# Patient Record
Sex: FEMALE | Race: ASIAN | Employment: FULL TIME | URBAN - METROPOLITAN AREA
[De-identification: names, ages, dates, MRNs, and addresses within clinical notes are randomized per-mention and may not be internally consistent; named-entity substitution may affect disease eponyms.]

---

## 2017-05-23 ENCOUNTER — OFFICE VISIT (OUTPATIENT)
Dept: FAMILY MEDICINE | Facility: CLINIC | Age: 52
End: 2017-05-23
Payer: COMMERCIAL

## 2017-05-23 VITALS
BODY MASS INDEX: 27.15 KG/M2 | HEIGHT: 61 IN | SYSTOLIC BLOOD PRESSURE: 122 MMHG | WEIGHT: 143.8 LBS | HEART RATE: 74 BPM | DIASTOLIC BLOOD PRESSURE: 84 MMHG | TEMPERATURE: 98 F | OXYGEN SATURATION: 100 %

## 2017-05-23 DIAGNOSIS — I10 HYPERTENSION, GOAL BELOW 140/90: ICD-10-CM

## 2017-05-23 DIAGNOSIS — Z86.69 HISTORY OF MIGRAINE HEADACHES: ICD-10-CM

## 2017-05-23 DIAGNOSIS — Z12.31 ENCOUNTER FOR SCREENING MAMMOGRAM FOR BREAST CANCER: ICD-10-CM

## 2017-05-23 DIAGNOSIS — K21.9 GASTROESOPHAGEAL REFLUX DISEASE WITHOUT ESOPHAGITIS: ICD-10-CM

## 2017-05-23 DIAGNOSIS — R09.82 ALLERGIC RHINITIS WITH POSTNASAL DRIP: ICD-10-CM

## 2017-05-23 DIAGNOSIS — Z00.00 ROUTINE GENERAL MEDICAL EXAMINATION AT A HEALTH CARE FACILITY: Primary | ICD-10-CM

## 2017-05-23 DIAGNOSIS — J30.9 ALLERGIC RHINITIS WITH POSTNASAL DRIP: ICD-10-CM

## 2017-05-23 DIAGNOSIS — E78.5 HYPERLIPIDEMIA LDL GOAL <130: ICD-10-CM

## 2017-05-23 DIAGNOSIS — I83.93 VARICOSE VEINS OF LEGS: ICD-10-CM

## 2017-05-23 LAB
ALBUMIN SERPL-MCNC: 3.9 G/DL (ref 3.4–5)
ALP SERPL-CCNC: 150 U/L (ref 40–150)
ALT SERPL W P-5'-P-CCNC: 37 U/L (ref 0–50)
ANION GAP SERPL CALCULATED.3IONS-SCNC: 5 MMOL/L (ref 3–14)
AST SERPL W P-5'-P-CCNC: 22 U/L (ref 0–45)
BILIRUB SERPL-MCNC: 0.6 MG/DL (ref 0.2–1.3)
BUN SERPL-MCNC: 12 MG/DL (ref 7–30)
CALCIUM SERPL-MCNC: 9.1 MG/DL (ref 8.5–10.1)
CHLORIDE SERPL-SCNC: 102 MMOL/L (ref 94–109)
CHOLEST SERPL-MCNC: 253 MG/DL
CO2 SERPL-SCNC: 32 MMOL/L (ref 20–32)
CREAT SERPL-MCNC: 0.62 MG/DL (ref 0.52–1.04)
GFR SERPL CREATININE-BSD FRML MDRD: ABNORMAL ML/MIN/1.7M2
GLUCOSE SERPL-MCNC: 113 MG/DL (ref 70–99)
HDLC SERPL-MCNC: 97 MG/DL
LDLC SERPL CALC-MCNC: 129 MG/DL
NONHDLC SERPL-MCNC: 156 MG/DL
POTASSIUM SERPL-SCNC: 4 MMOL/L (ref 3.4–5.3)
PROT SERPL-MCNC: 8 G/DL (ref 6.8–8.8)
SODIUM SERPL-SCNC: 139 MMOL/L (ref 133–144)
TRIGL SERPL-MCNC: 133 MG/DL

## 2017-05-23 PROCEDURE — 90715 TDAP VACCINE 7 YRS/> IM: CPT | Performed by: FAMILY MEDICINE

## 2017-05-23 PROCEDURE — 36415 COLL VENOUS BLD VENIPUNCTURE: CPT | Performed by: FAMILY MEDICINE

## 2017-05-23 PROCEDURE — 99396 PREV VISIT EST AGE 40-64: CPT | Mod: 25 | Performed by: FAMILY MEDICINE

## 2017-05-23 PROCEDURE — 90471 IMMUNIZATION ADMIN: CPT | Performed by: FAMILY MEDICINE

## 2017-05-23 PROCEDURE — 99213 OFFICE O/P EST LOW 20 MIN: CPT | Mod: 25 | Performed by: FAMILY MEDICINE

## 2017-05-23 PROCEDURE — 80053 COMPREHEN METABOLIC PANEL: CPT | Performed by: FAMILY MEDICINE

## 2017-05-23 PROCEDURE — 80061 LIPID PANEL: CPT | Performed by: FAMILY MEDICINE

## 2017-05-23 RX ORDER — FLUTICASONE PROPIONATE 50 MCG
1-2 SPRAY, SUSPENSION (ML) NASAL DAILY
Qty: 1 BOTTLE | Refills: 0 | Status: SHIPPED | OUTPATIENT
Start: 2017-05-23 | End: 2018-03-14

## 2017-05-23 RX ORDER — OMEPRAZOLE 40 MG/1
40 CAPSULE, DELAYED RELEASE ORAL DAILY PRN
Qty: 90 CAPSULE | Refills: 3 | Status: SHIPPED | OUTPATIENT
Start: 2017-05-23 | End: 2018-05-14

## 2017-05-23 RX ORDER — AMLODIPINE BESYLATE 10 MG/1
10 TABLET ORAL DAILY
Qty: 90 TABLET | Refills: 3 | Status: SHIPPED | OUTPATIENT
Start: 2017-05-23 | End: 2017-05-24

## 2017-05-23 RX ORDER — SIMVASTATIN 20 MG
20 TABLET ORAL AT BEDTIME
Qty: 90 TABLET | Refills: 3 | Status: SHIPPED | OUTPATIENT
Start: 2017-05-23 | End: 2017-09-21

## 2017-05-23 NOTE — NURSING NOTE
"Chief Complaint   Patient presents with     Physical       Initial /84  Pulse 74  Temp 98  F (36.7  C) (Tympanic)  Ht 5' 1\" (1.549 m)  Wt 143 lb 12.8 oz (65.2 kg)  SpO2 100%  BMI 27.17 kg/m2 Estimated body mass index is 27.17 kg/(m^2) as calculated from the following:    Height as of this encounter: 5' 1\" (1.549 m).    Weight as of this encounter: 143 lb 12.8 oz (65.2 kg).  Medication Reconciliation: complete     Due for mammogram, discussed with pt.  Referral was EXP, extended till Spetember 2017- pt was advised to have done before that time.     Naida Rose MA      "

## 2017-05-23 NOTE — LETTER
[facility] [place]  [address]  [place] [address]  [phone]        May 30, 2017      Mercedes Long  [address]   [place] [address]        Dear Mercedes,      Your recent labs showed;     Complete Metabolic Panel (panel that checks liver function, kidney function and electrolytes) was normal except blood glucose was slightly elevated.   This means that you have no evidence of diabetes at this time but you could be at risk ( prediabetes)   Will repeat your glucose test in 6 months     Cholesterol panel showed that your cholesterol is slightly elevated.   Total cholesterol was slightly high. Your LDL (bad cholesterol) goal is < 130. Yours was 129. HDL (good cholesterol) was within normal range.     ADVICE: Continue taking your Simvastatin as prescribed. Eat a well balanced heart healthy diet and exercise regularly (at least 5 times/ week for 45 minutes each session). Losing some weight will also help.     Results for orders placed or performed in visit on 05/23/17   Lipid Profile (Chol, Trig, HDL, LDL calc)   Result Value Ref Range    Cholesterol 253 (H) <200 mg/dL    Triglycerides 133 <150 mg/dL    HDL Cholesterol 97 >49 mg/dL    LDL Cholesterol Calculated 129 (H) <100 mg/dL    Non HDL Cholesterol 156 (H) <130 mg/dL   Comprehensive metabolic panel (BMP + Alb, Alk Phos, ALT, AST, Total. Bili, TP)   Result Value Ref Range    Sodium 139 133 - 144 mmol/L    Potassium 4.0 3.4 - 5.3 mmol/L    Chloride 102 94 - 109 mmol/L    Carbon Dioxide 32 20 - 32 mmol/L    Anion Gap 5 3 - 14 mmol/L    Glucose 113 (H) 70 - 99 mg/dL    Urea Nitrogen 12 7 - 30 mg/dL    Creatinine 0.62 0.52 - 1.04 mg/dL    GFR Estimate >90  Non  GFR Calc   >60 mL/min/1.7m2    GFR Estimate If Black >90   GFR Calc   >60 mL/min/1.7m2    Calcium 9.1 8.5 - 10.1 mg/dL    Bilirubin Total 0.6 0.2 - 1.3 mg/dL    Albumin 3.9 3.4 - 5.0 g/dL    Protein Total 8.0 6.8 - 8.8 g/dL    Alkaline Phosphatase 150  40 - 150 U/L    ALT 37 0 - 50 U/L    AST 22 0 - 45 U/L     If you have any questions or concerns, please call myself or my nurse at [phone].    Sincerely,    Gilma Fabian M.D/chad

## 2017-05-23 NOTE — PROGRESS NOTES
SUBJECTIVE:     CC: Mercedes Long is an 51 year old woman who presents for preventive health visit.     Healthy Habits:    Do you get at least three servings of calcium containing foods daily (dairy, green leafy vegetables, etc.)? No    Amount of exercise or daily activities, outside of work: None    Problems taking medications regularly No    Medication side effects: No    Have you had an eye exam in the past two years? No    Do you see a dentist twice per year? No, 1x/ year  Do you have sleep apnea, excessive snoring or daytime drowsiness? Yes, snoring with sleeping     Medication Question  Has not been taking amitriptyline, feels she is still getting headaches, feels they are sinus headaches due to allergies.  States that she drainage at the back of her throat that is causing sore throat. No OTC meds tried.  Is wondering if she should start taking that medication again.   Has been off the Amitriptyline for over 6 months. States that the migraine headaches have been stable off the medications.     Also reports aches legs with varicose veins. No calf pain.     Patient informed that anything we discuss that is not related to preventative medicine, may be billed for; patient verbalizes understanding.    Today's PHQ-2 Score:   PHQ-2 ( 1999 [employer]) 5/23/2017 5/10/2016   Q1: Little interest or pleasure in doing things 0 0   Q2: Feeling down, depressed or hopeless 0 0   PHQ-2 Score 0 0       Abuse: Current or Past(Physical, Sexual or Emotional)- No  Do you feel safe in your environment - Yes    Social History   Substance Use Topics     Smoking status: Never Smoker     Smokeless tobacco: Not on file     Alcohol use No     The patient does not drink >3 drinks per day nor >7 drinks per week.    Recent Labs   Lab Test  05/10/16   0859 10/13/15 01/19/15   CHOL  167  221*  260*   HDL  87  61  74   LDL  62  129  159   TRIG  89  157  137   CHOLHDLRATIO   --   3.6  3.5   NHDL  80   --    --        Reviewed orders with patient.   Reviewed health maintenance and updated orders accordingly - Yes    Mammo Decision Support:  Patient over age 50, mutual decision to screen reflected in health maintenance.    Pertinent mammograms are reviewed under the imaging tab.  History of abnormal Pap smear:   NO - age 30- 65 PAP every 3 years recommended  Last 3 Pap Results:   PAP (no units)   Date Value   05/10/2016 NIL       Reviewed and updated as needed this visit by clinical staff  Tobacco  Allergies  Meds  Soc Hx        Reviewed and updated as needed this visit by Provider        Past Medical History:   Diagnosis Date     GERD (gastroesophageal reflux disease)      Hypercholesteremia      Hypertension       Past Surgical History:   Procedure Laterality Date     APPENDECTOMY       CARPAL TUNNEL RELEASE RT/LT Right      COLONOSCOPY WITH CO2 INSUFFLATION N/A 2016    Procedure: COLONOSCOPY WITH CO2 INSUFFLATION;  Surgeon: Duane, William Charles, MD;  Location:  OR     Obstetric History       T3      TAB0   SAB0   E0   M0   L3       # Outcome Date GA Lbr Rigo/2nd Weight Sex Delivery Anes PTL Lv   4             3 Term            2 Term            1 Term                   ROS:  C: NEGATIVE for fever, chills, change in weight  I: NEGATIVE for worrisome rashes, moles or lesions  E: NEGATIVE for vision changes or irritation  ENT: NEGATIVE for ear, mouth and throat problems  R: NEGATIVE for significant cough or SOB  B: NEGATIVE for masses, tenderness or discharge  CV: NEGATIVE for chest pain, palpitations or peripheral edema  GI: NEGATIVE for nausea, abdominal pain, heartburn, or change in bowel habits  : NEGATIVE for unusual urinary or vaginal symptoms. No vaginal bleeding.  M: NEGATIVE for significant arthralgias or myalgia  N: NEGATIVE for weakness, dizziness or paresthesias  P: NEGATIVE for changes in mood or affect     Current Outpatient Prescriptions   Medication Sig Dispense Refill     amLODIPine (NORVASC) 10 MG tablet Take  "1 tablet (10 mg) by mouth daily 90 tablet 3     omeprazole (PRILOSEC) 40 MG capsule Take 1 capsule (40 mg) by mouth daily as needed Take 30-60 minutes before a meal. 90 capsule 3     simvastatin (ZOCOR) 20 MG tablet Take 1 tablet (20 mg) by mouth At Bedtime 90 tablet 3     fluticasone (FLONASE) 50 MCG/ACT spray Spray 1-2 sprays into both nostrils daily 1 Bottle 0     aspirin 81 MG tablet Take by mouth daily       [DISCONTINUED] amLODIPine (NORVASC) 10 MG tablet Take 1 tablet (10 mg) by mouth daily 90 tablet 3     [DISCONTINUED] simvastatin (ZOCOR) 20 MG tablet   5     No Known Allergies  OBJECTIVE:     /84  Pulse 74  Temp 98  F (36.7  C) (Tympanic)  Ht 5' 1\" (1.549 m)  Wt 143 lb 12.8 oz (65.2 kg)  SpO2 100%  BMI 27.17 kg/m2  EXAM:  GENERAL: healthy, alert and no distress  EYES: Eyes grossly normal to inspection, PERRL and conjunctivae and sclerae normal  HENT: ear canals and TM's normal, nose and mouth without ulcers or lesions  NECK: no adenopathy, no asymmetry, masses, or scars and thyroid normal to palpation  RESP: lungs clear to auscultation - no rales, rhonchi or wheezes  BREAST: normal without masses, tenderness or nipple discharge and no palpable axillary masses or adenopathy  CV: regular rate and rhythm, normal S1 S2, no S3 or S4, no murmur, click or rub, no peripheral edema and peripheral pulses strong  ABDOMEN: soft, nontender, no hepatosplenomegaly, no masses and bowel sounds normal  MS: no gross musculoskeletal defects noted, no edema  SKIN: no suspicious lesions or rashes. Varicose veins on bilateral lower extremities.   NEURO: Normal strength and tone, mentation intact and speech normal  PSYCH: mentation appears normal, affect normal/bright    DATA  Labs in process.     ASSESSMENT/PLAN:     Mercedes was seen today for physical.    Diagnoses and all orders for this visit:    Routine general medical examination at a health care facility  -     TDAP VACCINE (ADACEL)  -     VACCINE ADMINISTRATION, " "INITIAL    Hyperlipidemia LDL goal <130  -     Refill: simvastatin (ZOCOR) 20 MG tablet; Take 1 tablet (20 mg) by mouth At Bedtime  -     Lipid Profile (Chol, Trig, HDL, LDL calc)  -     Comprehensive metabolic panel (BMP + Alb, Alk Phos, ALT, AST, Total. Bili, TP)    Hypertension, goal below 140/90, controlled on medications  -     Refill: amLODIPine (NORVASC) 10 MG tablet; Take 1 tablet (10 mg) by mouth daily  -     Comprehensive metabolic panel (BMP + Alb, Alk Phos, ALT, AST, Total. Bili, TP)    Gastroesophageal reflux disease without esophagitis  -     Refill: omeprazole (PRILOSEC) 40 MG capsule; Take 1 capsule (40 mg) by mouth daily as needed Take 30-60 minutes before a meal.    Encounter for screening mammogram for breast cancer  Screening mammogram previously ordered.   Patient to call and schedule    Varicose veins of legs  -  Recommended wearing compression stockings.   - VASCULAR SURGERY REFERRAL    Allergic rhinitis with postnasal drip  -    Start:  fluticasone (FLONASE) 50 MCG/ACT spray; Spray 1-2 sprays into both nostrils daily  May also take OTC Zyrtec as needed    History of migraine headaches, stable off medications  -     MIGRAINE ACTION PLAN  -     Cancel: amitriptyline (ELAVIL) 25 MG tablet; Take 1 tablet (25 mg) by mouth At Bedtime          COUNSELING:   Reviewed preventive health counseling, as reflected in patient instructions       Regular exercise       Healthy diet/nutrition    BP Screening:   Last 3 BP Readings:    BP Readings from Last 3 Encounters:   05/23/17 122/84   06/21/16 134/89   05/18/16 112/80       The following was recommended to the patient:  Re-screen BP within a year and recommended lifestyle modifications     reports that she has never smoked. She does not have any smokeless tobacco history on file.    Estimated body mass index is 27.17 kg/(m^2) as calculated from the following:    Height as of this encounter: 5' 1\" (1.549 m).    Weight as of this encounter: 143 lb 12.8 oz " (65.2 kg).   Weight management plan: Discussed healthy diet and exercise guidelines and patient will follow up in 12 months in clinic to re-evaluate.    Counseling Resources:  ATP IV Guidelines  Pooled Cohorts Equation Calculator  Breast Cancer Risk Calculator  FRAX Risk Assessment  ICSI Preventive Guidelines  Dietary Guidelines for Americans, 2010  USDA's MyPlate  ASA Prophylaxis  Lung CA Screening    Follow up annually and as needed thoughout the year.    Gilma Fabian MD  [facility]

## 2017-05-23 NOTE — MR AVS SNAPSHOT
After Visit Summary   5/23/2017    Mercedes Long    MRN: [id number]           Patient Information     Date Of Birth          1965        Visit Information        Provider Department      5/23/2017 9:15 AM Gilma Fabian MD; [employer] [facility] [place]        Today's Diagnoses     Routine general medical examination at a health care facility    -  1    Hyperlipidemia LDL goal <130        Hypertension, goal below 140/90        Gastroesophageal reflux disease without esophagitis        Encounter for screening mammogram for breast cancer        Varicose veins of legs        Allergic rhinitis with postnasal drip          Care Instructions      Preventive Health Recommendations  Female Ages 50 - 64    Yearly exam: See your health care provider every year in order to  o Review health changes.   o Discuss preventive care.    o Review your medicines if your doctor has prescribed any.      Get a Pap test every three years (unless you have an abnormal result and your provider advises testing more often).    If you get Pap tests with HPV test, you only need to test every 5 years, unless you have an abnormal result.     You do not need a Pap test if your uterus was removed (hysterectomy) and you have not had cancer.    You should be tested each year for STDs (sexually transmitted diseases) if you're at risk.     Have a mammogram every 1 to 2 years.    Have a colonoscopy at age 50, or have a yearly FIT test (stool test). These exams screen for colon cancer.      Have a cholesterol test every 5 years, or more often if advised.    Have a diabetes test (fasting glucose) every three years. If you are at risk for diabetes, you should have this test more often.     If you are at risk for osteoporosis (brittle bone disease), think about having a bone density scan (DEXA).    Shots: Get a flu shot each year. Get a tetanus shot every 10 years.    Nutrition:     Eat at least 5 servings of  fruits and vegetables each day.    Eat whole-grain bread, whole-wheat pasta and brown rice instead of white grains and rice.    Talk to your provider about Calcium and Vitamin D.     Lifestyle    Exercise at least 150 minutes a week (30 minutes a day, 5 days a week). This will help you control your weight and prevent disease.    Limit alcohol to one drink per day.    No smoking.     Wear sunscreen to prevent skin cancer.     See your dentist every six months for an exam and cleaning.    See your eye doctor every 1 to 2 years.          Follow-ups after your visit        Additional Services     VASCULAR SURGERY REFERRAL       Your provider has referred you to: **Vascular  Services [phone] - Varicose Veins & None - Please Order Appropriate Testing   [email or web address]    Please be aware that coverage of these services is subject to the terms and limitations of your health insurance plan.  Call member services at your health plan with any benefit or coverage questions.      Please bring the following with you to your appointment:    (1) Any X-Rays, CTs or MRIs which have been performed.  Contact the facility where they were done to arrange for  prior to your scheduled appointment.    (2) List of current medications   (3) This referral request   (4) Any documents/labs given to you for this referral                  Follow-up notes from your care team     Return in about 1 year (around 5/23/2018) for Physical Exam and as needed throughout the year.      Who to contact     Normal or non-critical lab and imaging results will be communicated to you by MyChart, letter or phone within 4 business days after the clinic has received the results. If you do not hear from us within 7 days, please contact the clinic through MyChart or phone. If you have a critical or abnormal lab result, we will notify you by phone as soon as possible.  Submit refill requests through [employer] or  "call your pharmacy and they will forward the refill request to us. Please allow 3 business days for your refill to be completed.          If you need to speak with a  for additional information , please call: [phone]             Additional Information About Your Visit        INNOBIharTweepsMap Information     [employer] lets you send messages to your doctor, view your test results, renew your prescriptions, schedule appointments and more. To sign up, go to [email or web address] . Click on \"Log in\" on the left side of the screen, which will take you to the Welcome page. Then click on \"Sign up Now\" on the right side of the page.     You will be asked to enter the access code listed below, as well as some personal information. Please follow the directions to create your username and password.     Your access code is: [email or web address]  Expires: 2017 10:09 AM     Your access code will  in 90 days. If you need help or a new code, please call your [place] clinic or [phone].        Care EveryWhere ID     This is your Care EveryWhere ID. This could be used by other organizations to access your [place] medical records  [id number]        Your Vitals Were     Pulse Temperature Height Pulse Oximetry BMI (Body Mass Index)       74 98  F (36.7  C) (Tympanic) 5' 1\" (1.549 m) 100% 27.17 kg/m2        Blood Pressure from Last 3 Encounters:   17 122/84   16 134/89   16 112/80    Weight from Last 3 Encounters:   17 143 lb 12.8 oz (65.2 kg)   16 144 lb 3.2 oz (65.4 kg)   16 142 lb 12.8 oz (64.8 kg)              We Performed the Following     Comprehensive metabolic panel (BMP + Alb, Alk Phos, ALT, AST, Total. Bili, TP)     Lipid Profile (Chol, Trig, HDL, LDL calc)     MIGRAINE ACTION PLAN     TDAP VACCINE (ADACEL)     VACCINE ADMINISTRATION, INITIAL     VASCULAR SURGERY REFERRAL          Today's Medication Changes          These changes are accurate as of: 17 10:09 AM.  " If you have any questions, ask your nurse or doctor.               Start taking these medicines.        Dose/Directions    fluticasone 50 MCG/ACT spray   Commonly known as:  FLONASE   Used for:  Allergic rhinitis with postnasal drip   Started by:  Gilma Fabian MD        Dose:  1-2 spray   Spray 1-2 sprays into both nostrils daily   Quantity:  1 Bottle   Refills:  0         These medicines have changed or have updated prescriptions.        Dose/Directions    omeprazole 40 MG capsule   Commonly known as:  priLOSEC   This may have changed:    - when to take this  - reasons to take this   Used for:  Gastroesophageal reflux disease without esophagitis   Changed by:  Gilma Fabian MD        Dose:  40 mg   Take 1 capsule (40 mg) by mouth daily as needed Take 30-60 minutes before a meal.   Quantity:  90 capsule   Refills:  3       simvastatin 20 MG tablet   Commonly known as:  ZOCOR   This may have changed:    - how much to take  - how to take this  - when to take this   Used for:  Hyperlipidemia LDL goal <130   Changed by:  Gilma Fabian MD        Dose:  20 mg   Take 1 tablet (20 mg) by mouth At Bedtime   Quantity:  90 tablet   Refills:  3            Where to get your medicines      [facility] [address] [place] [address], [place] - [address] AT [facility] [address], [place] [address]     Phone:  [phone]     amLODIPine 10 MG tablet    fluticasone 50 MCG/ACT spray    omeprazole 40 MG capsule    simvastatin 20 MG tablet                Primary Care Provider Office Phone #    Tushar Rojo [facility] [phone]       No address on file        Thank you!     Thank you for choosing [facility]  for your care. Our goal is always to provide you with excellent care. Hearing back from our patients is one way we can continue to improve our services. Please take a few minutes to complete the written survey that you may receive in the  mail after your visit with us. Thank you!             Your Updated Medication List - Protect others around you: Learn how to safely use, store and throw away your medicines at www.disposemymeds.org.          This list is accurate as of: 5/23/17 10:09 AM.  Always use your most recent med list.                   Brand Name Dispense Instructions for use    amLODIPine 10 MG tablet    NORVASC    90 tablet    Take 1 tablet (10 mg) by mouth daily       aspirin 81 MG tablet      Take by mouth daily       fluticasone 50 MCG/ACT spray    FLONASE    1 Bottle    Spray 1-2 sprays into both nostrils daily       omeprazole 40 MG capsule    priLOSEC    90 capsule    Take 1 capsule (40 mg) by mouth daily as needed Take 30-60 minutes before a meal.       simvastatin 20 MG tablet    ZOCOR    90 tablet    Take 1 tablet (20 mg) by mouth At Bedtime

## 2017-05-23 NOTE — LETTER
My Migraine Action Plan      Date: 5/23/2017     My Name: Mercedes Long   YOB: 1965  My Pharmacy: [id number]  [place], [place] [facility] [address] [facility] [address]        My Doctor: Tushar Mccartney     My Clinic: [facility]  [address]  [place] [address] [phone]        GREEN ZONE = Good Control    My headache plan is working.   I can do what I need to do.           I WILL:     ? Keep managing my triggers.  ? Write in my migraine diary each time I have a headache.  ? Keep taking my preventive (controller) medicine daily.  ? Take my relief and rescue medicine as needed.             YELLOW ZONE = Not Enough Control    My headache plan isn t always working.   My headaches keep me from doing   some of the things I need to do.       I WILL:     ? Set goals to control my triggers and act on them.  ? Write in my migraine diary each time I have a headache and review it for                      patterns or new triggers.  ? Keep taking my preventive (controller) medicine daily.  ? Take my relief and rescue medicine as needed.  ? Call my doctor or clinic at if I stay in the Yellow Zone.             RED ZONE = Poor or No Control    My headache plan has  failed. I can t do anything  when I have one. My  medicines aren t working.           I WILL:   ? Set goals to control my triggers and act on them.  ? Write in my migraine diary each time I have a headache and review it for                      patterns or new triggers.  ? Keep taking my preventive (controller) medicine daily.  ? Take my relief and rescue medicine as needed.  ? Call my doctor or clinic or go to urgent care or an ER if I m having the worst                  headache of my life.  ? Call my doctor or clinic or go to urgent care or an ER if my medicine doesn t work.  ? Let my doctor or clinic know within 2 weeks if I have gone to an urgent care or             emergency  department.

## 2017-05-24 DIAGNOSIS — I10 HYPERTENSION, GOAL BELOW 140/90: ICD-10-CM

## 2017-05-24 NOTE — TELEPHONE ENCOUNTER
Amlodipine      Last Written Prescription Date: 4/24/17  Last Fill Quantity: 90, # refills: 0    Last Office Visit with G, P or [facility] prescribing provider:  5/23/17   Future Office Visit:        BP Readings from Last 3 Encounters:   05/23/17 122/84   06/21/16 134/89   05/18/16 112/80

## 2017-05-25 RX ORDER — AMLODIPINE BESYLATE 10 MG/1
TABLET ORAL
Qty: 90 TABLET | Refills: 1 | Status: SHIPPED | OUTPATIENT
Start: 2017-05-25 | End: 2017-11-17

## 2017-05-30 DIAGNOSIS — R73.03 PREDIABETES: Primary | ICD-10-CM

## 2017-08-21 ENCOUNTER — APPOINTMENT (OUTPATIENT)
Dept: VASCULAR SURGERY | Facility: CLINIC | Age: 52
End: 2017-08-21
Payer: COMMERCIAL

## 2017-08-21 PROCEDURE — 99207 ZZC VEINSOLUTIONS FREE SCREENING: CPT | Performed by: SURGERY

## 2017-08-28 ENCOUNTER — APPOINTMENT (OUTPATIENT)
Dept: VASCULAR SURGERY | Facility: CLINIC | Age: 52
End: 2017-08-28
Payer: COMMERCIAL

## 2017-08-28 ENCOUNTER — OFFICE VISIT (OUTPATIENT)
Dept: VASCULAR SURGERY | Facility: CLINIC | Age: 52
End: 2017-08-28
Payer: COMMERCIAL

## 2017-08-28 DIAGNOSIS — Z53.9 ERRONEOUS ENCOUNTER--DISREGARD: Primary | ICD-10-CM

## 2017-08-28 PROCEDURE — 99203 OFFICE O/P NEW LOW 30 MIN: CPT | Performed by: SURGERY

## 2017-08-28 PROCEDURE — 93970 EXTREMITY STUDY: CPT | Performed by: SURGERY

## 2017-08-28 NOTE — MR AVS SNAPSHOT
"              After Visit Summary   2017    Mercedes Long    MRN: [id number]           Patient Information     Date Of Birth          1965        Visit Information        Provider Department      2017 2:00 PM Daniel Steele MD; [employer] [facility]      Today's Diagnoses     ERRONEOUS ENCOUNTER--DISREGARD    -  1       Follow-ups after your visit        Who to contact     If you have questions or need follow up information about today's clinic visit or your schedule please contact [facility] directly at [phone].  Normal or non-critical lab and imaging results will be communicated to you by MyChart, letter or phone within 4 business days after the clinic has received the results. If you do not hear from us within 7 days, please contact the clinic through Ducksboardhart or phone. If you have a critical or abnormal lab result, we will notify you by phone as soon as possible.  Submit refill requests through Blue Saint or call your pharmacy and they will forward the refill request to us. Please allow 3 business days for your refill to be completed.          Additional Information About Your Visit        MyChart Information     Blue Saint lets you send messages to your doctor, view your test results, renew your prescriptions, schedule appointments and more. To sign up, go to [email or web address] . Click on \"Log in\" on the left side of the screen, which will take you to the Welcome page. Then click on \"Sign up Now\" on the right side of the page.     You will be asked to enter the access code listed below, as well as some personal information. Please follow the directions to create your username and password.     Your access code is: [email or web address]  Expires: 2018 10:52 AM     Your access code will  in 90 days. If you need help or a new code, please call your [facility] clinic or [phone].   "      Care EveryWhere ID     This is your Care EveryWhere ID. This could be used by other organizations to access your [facility] medical records  [id number]         Blood Pressure from Last 3 Encounters:   03/14/18 119/80   10/06/17 128/87   09/21/17 120/83    Weight from Last 3 Encounters:   03/14/18 66.7 kg (147 lb)   10/06/17 68.3 kg (150 lb 8 oz)   09/21/17 67.7 kg (149 lb 3.2 oz)              Today, you had the following     No orders found for display       Primary Care Provider Office Phone # Fax #    [facility] [phone]       No address on file        Equal Access to Services     MIRNA LOFTON : Humberto Rothman, dayana payne, rajni kaalmakimi calle, corey valadez. So [facility] [phone].    ATENCIÓN: Si habla español, tiene a garland disposición servicios gratuitos de asistencia lingüística. Llame al [phone].    We comply with applicable federal civil rights laws and [place] laws. We do not discriminate on the basis of race, color, national origin, age, disability, sex, sexual orientation, or gender identity.            Thank you!     Thank you for choosing [facility]  for your care. Our goal is always to provide you with excellent care. Hearing back from our patients is one way we can continue to improve our services. Please take a few minutes to complete the written survey that you may receive in the mail after your visit with us. Thank you!             Your Updated Medication List - Protect others around you: Learn how to safely use, store and throw away your medicines at www.disposemymeds.org.          This list is accurate as of 8/28/17 11:59 PM.  Always use your most recent med list.                   Brand Name Dispense Instructions for use Diagnosis    aspirin 81 MG tablet      Take by mouth daily        omeprazole 40 MG capsule    priLOSEC    90 capsule    Take 1 capsule (40 mg) by mouth daily as  needed Take 30-60 minutes before a meal.    Gastroesophageal reflux disease without esophagitis

## 2017-08-28 NOTE — PROGRESS NOTES
Vascular Surgery Consultation    Mercedes Long is a 51-year-old female who presents with complaints of left greater than right leg pain and varicose veins.  She states she was seen in [place] for this problem several years ago and was prescribed compression hose.  She wore them while in [place] but when she moved to [place] she did not bring her hose with her.  The pain is located primarily in the left calf and extends into the left foot.  It is described as a tightness, aching, cramping and burning which she states is present even on arising from sleep in the morning.  The left foot seems to be the area of her most significant symptoms.  She has not suffered trauma to the left leg nor has she had deep vein thrombosis or superficial thrombophlebitis.    She admits that the right leg swells more than the left and that she has varicose veins on her right leg but this leg is less troublesome than the left.  Exercise or walking makes the pain better.    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    12 point review of systems was completed and was reviewed.  It is significant for chills, arthritis, back pain, weakness, headaches, skin rash and finger/toenail problems.    Physical exam  General: Pleasant female in no acute distress.  Blood pressure is 130/84 pulse is 82 and regular  HEENT: Normocephalic, atraumatic.  EOMI.  External ears and nose are normal.  Respiratory: Normal respiratory effort.  Cardiovascular: Pulse is regular  Psychiatric: Judgment, insight, mood and affect are normal  Musculoskeletal: Gait and station are normal.  Joints of her fingers and toes are without deformity.  Neurologic: Grossly  normal  Extremities: Coursing down the medial aspect of the right leg are 4 mm varicosities extending anterolaterally.  There is no significant edema of her right leg nor there any significant venous stasis changes.  There are a few scattered T lens ectasias in the right leg.  She has 1-2+ edema of the right ankle.  In the left leg, I appreciate no significant varicose veins or telangiectasias.  There is 1+ edema.  There are petechial hyperpigmented areas suggestive of small petechiae which have sustained her skin.  None of these are red or look new.  There are a few of these hyperpigmented petechial areas on the right leg as well.  She has 4+ dorsalis pedis and posterior tibial pulses bilaterally.    Duplex ultrasound of her right lower extremity veins reveals a deep vein thrombosis or deep vein valve incompetence.  Her right great saphenous vein is incompetent from the proximal thigh to the knee.  It measures 4.1 mm distally in the leg.  The below-knee great saphenous vein is not visualized.  The right small saphenous vein is competent as is the right anterior accessory saphenous vein.  The vena Giacomini is not visualized.  There are incompetent perforators appreciated.  In the left lower extremity has no evidence of deep vein thrombosis or deep vein valve incompetence.  The left great saphenous vein, small saphenous vein, anterior accessory saphenous vein and vein of Giacomini are competent.    Impression  The pain in the left leg is not on the basis of venous disease.  Both are deep and superficial systems in the left leg appear normal.  This may be musculoskeletal in origin.  She does have right great saphenous vein incompetence but this is relatively asymptomatic compared to the left leg.  I would recommend continued conservative management with use of graded compression hose, exercise, weight loss and dietary discretion.  If her symptoms change or she has other questions, I would be happy to see her in the  future.  We discussed risks of conservative management including superficial thrombophlebitis, bleeding or worsening of the varicose veins.  She appeared to understand and agrees with the plan.    SULEMA Steele M.D.    Impression

## 2017-09-21 ENCOUNTER — RADIANT APPOINTMENT (OUTPATIENT)
Dept: GENERAL RADIOLOGY | Facility: CLINIC | Age: 52
End: 2017-09-21
Attending: PHYSICIAN ASSISTANT
Payer: COMMERCIAL

## 2017-09-21 ENCOUNTER — OFFICE VISIT (OUTPATIENT)
Dept: FAMILY MEDICINE | Facility: CLINIC | Age: 52
End: 2017-09-21
Payer: COMMERCIAL

## 2017-09-21 VITALS
WEIGHT: 149.2 LBS | TEMPERATURE: 98.2 F | BODY MASS INDEX: 28.19 KG/M2 | HEART RATE: 76 BPM | DIASTOLIC BLOOD PRESSURE: 83 MMHG | SYSTOLIC BLOOD PRESSURE: 120 MMHG

## 2017-09-21 DIAGNOSIS — E78.5 HYPERLIPIDEMIA LDL GOAL <130: ICD-10-CM

## 2017-09-21 DIAGNOSIS — M25.562 ACUTE PAIN OF LEFT KNEE: Primary | ICD-10-CM

## 2017-09-21 DIAGNOSIS — M25.562 ACUTE PAIN OF LEFT KNEE: ICD-10-CM

## 2017-09-21 DIAGNOSIS — Z12.31 ENCOUNTER FOR SCREENING MAMMOGRAM FOR BREAST CANCER: ICD-10-CM

## 2017-09-21 PROCEDURE — 99213 OFFICE O/P EST LOW 20 MIN: CPT | Performed by: PHYSICIAN ASSISTANT

## 2017-09-21 PROCEDURE — 73560 X-RAY EXAM OF KNEE 1 OR 2: CPT | Mod: LT

## 2017-09-21 RX ORDER — DICLOFENAC SODIUM 75 MG/1
75 TABLET, DELAYED RELEASE ORAL 2 TIMES DAILY
Qty: 14 TABLET | Refills: 0 | Status: SHIPPED | OUTPATIENT
Start: 2017-09-21 | End: 2017-09-28

## 2017-09-21 RX ORDER — SIMVASTATIN 20 MG
20 TABLET ORAL AT BEDTIME
Qty: 90 TABLET | Refills: 3 | Status: SHIPPED | OUTPATIENT
Start: 2017-09-21 | End: 2018-03-14

## 2017-09-21 RX ORDER — SIMVASTATIN 20 MG
20 TABLET ORAL AT BEDTIME
Qty: 90 TABLET | Refills: 3 | Status: SHIPPED | OUTPATIENT
Start: 2017-09-21 | End: 2017-09-21

## 2017-09-21 NOTE — MR AVS SNAPSHOT
"              After Visit Summary   9/21/2017    Mercedes Long    MRN: [id number]           Patient Information     Date Of Birth          1965        Visit Information        Provider Department      9/21/2017 9:45 AM Laura Bentley PA-C; [employer] [facility]        Today's Diagnoses     Acute pain of left knee    -  1    Encounter for screening mammogram for breast cancer        Hyperlipidemia LDL goal <130          Care Instructions    Call me in 10-14 days if your symptoms have not improved and I'll have you see physical therapy.          Follow-ups after your visit        Future tests that were ordered for you today     Open Future Orders        Priority Expected Expires Ordered    *MA Screening Digital Bilateral Routine  9/21/2018 9/21/2017            Who to contact     Normal or non-critical lab and imaging results will be communicated to you by Mc4hart, letter or phone within 4 business days after the clinic has received the results. If you do not hear from us within 7 days, please contact the clinic through Mc4hart or phone. If you have a critical or abnormal lab result, we will notify you by phone as soon as possible.  Submit refill requests through Andera or call your pharmacy and they will forward the refill request to us. Please allow 3 business days for your refill to be completed.          If you need to speak with a  for additional information , please call: [phone]             Additional Information About Your Visit        Andera Information     Andera lets you send messages to your doctor, view your test results, renew your prescriptions, schedule appointments and more. To sign up, go to [email or web address] . Click on \"Log in\" on the left side of the screen, which will take you to the Welcome page. Then click on \"Sign up Now\" on the right side of the page.     You will be asked to enter the access code listed below, as well " as some personal information. Please follow the directions to create your username and password.     Your access code is: [email or web address]  Expires: 2017 10:26 AM     Your access code will  in 90 days. If you need help or a new code, please call your [facility] clinic or [phone].        Care EveryWhere ID     This is your Care EveryWhere ID. This could be used by other organizations to access your [facility] medical records  [id number]        Your Vitals Were     Pulse Temperature BMI (Body Mass Index)             76 98.2  F (36.8  C) (Oral) 28.19 kg/m2          Blood Pressure from Last 3 Encounters:   17 120/83   17 122/84   16 134/89    Weight from Last 3 Encounters:   17 149 lb 3.2 oz (67.7 kg)   17 143 lb 12.8 oz (65.2 kg)   16 144 lb 3.2 oz (65.4 kg)                 Today's Medication Changes          These changes are accurate as of: 17 10:26 AM.  If you have any questions, ask your nurse or doctor.               Start taking these medicines.        Dose/Directions    diclofenac 75 MG EC tablet   Commonly known as:  VOLTAREN   Used for:  Acute pain of left knee   Started by:  Laura Bentley PA-C        Dose:  75 mg   Take 1 tablet (75 mg) by mouth 2 times daily for 7 days   Quantity:  14 tablet   Refills:  0       simvastatin 20 MG tablet   Commonly known as:  ZOCOR   Used for:  Hyperlipidemia LDL goal <130   Started by:  Laura Bentley PA-C        Dose:  20 mg   Take 1 tablet (20 mg) by mouth At Bedtime   Quantity:  90 tablet   Refills:  3            Where to get your medicines      These medications were sent to [id number]  [address] - [address] [address]     Phone:  [phone]     diclofenac 75 MG EC tablet    simvastatin 20 MG tablet                Primary Care Provider Office Phone #    [facility] [phone]       No address on file         Equal Access to Services     [facility]: Hadii rukhsana sánchez lakhwinderevita Stephan, waaxda luqadaha, qaybta kademarioikmi calle, ocrey valadez. So [facility] [phone].    ATENCIÓN: Si habla español, tiene a garland disposición servicios gratuitos de asistencia lingüística. Loraame al [phone].    We comply with applicable federal civil rights laws and [place] laws. We do not discriminate on the basis of race, color, national origin, age, disability sex, sexual orientation or gender identity.            Thank you!     Thank you for choosing [facility]  for your care. Our goal is always to provide you with excellent care. Hearing back from our patients is one way we can continue to improve our services. Please take a few minutes to complete the written survey that you may receive in the mail after your visit with us. Thank you!             Your Updated Medication List - Protect others around you: Learn how to safely use, store and throw away your medicines at www.disposemymeds.org.          This list is accurate as of: 9/21/17 10:26 AM.  Always use your most recent med list.                   Brand Name Dispense Instructions for use Diagnosis    amLODIPine 10 MG tablet    NORVASC    90 tablet    TAKE 1 TABLET BY MOUTH EVERY DAY    Hypertension, goal below 140/90       aspirin 81 MG tablet      Take by mouth daily        diclofenac 75 MG EC tablet    VOLTAREN    14 tablet    Take 1 tablet (75 mg) by mouth 2 times daily for 7 days    Acute pain of left knee       fluticasone 50 MCG/ACT spray    FLONASE    1 Bottle    Spray 1-2 sprays into both nostrils daily    Allergic rhinitis with postnasal drip       omeprazole 40 MG capsule    priLOSEC    90 capsule    Take 1 capsule (40 mg) by mouth daily as needed Take 30-60 minutes before a meal.    Gastroesophageal reflux disease without esophagitis       simvastatin 20 MG tablet    ZOCOR    90 tablet    Take 1 tablet (20 mg) by mouth At Bedtime     Hyperlipidemia LDL goal <130

## 2017-09-21 NOTE — NURSING NOTE
"Chief Complaint   Patient presents with     Musculoskeletal Problem     leg pain        Initial /83  Pulse 76  Temp 98.2  F (36.8  C) (Oral)  Wt 149 lb 3.2 oz (67.7 kg)  BMI 28.19 kg/m2 Estimated body mass index is 28.19 kg/(m^2) as calculated from the following:    Height as of 5/23/17: 5' 1\" (1.549 m).    Weight as of this encounter: 149 lb 3.2 oz (67.7 kg).  Medication Reconciliation: Complete      Danuta Quintero CMA    "

## 2017-09-21 NOTE — PROGRESS NOTES
SUBJECTIVE:   Mercedes Long is a 52 year old female who presents to clinic today for the following health issues:      Musculoskeletal problem/pain      Duration: 1 week     Description  Location: Left knee     Intensity:  moderate    Accompanying signs and symptoms: radiation of pain to left foot and swelling    History  Previous similar problem: no   Previous evaluation:  none    Precipitating or alleviating factors:  Trauma or overuse: YES- patient states she fell   Aggravating factors include: walking, climbing stairs and overuse    Therapies tried and outcome: rest/inactivity and NSAID -     Patient states she fell at work, floor was slippery  Fell onto the front of the knee  Has pain with bending, going up and down stairs, walking  No buckling  Has heard some cracking or popping from the knee      Problem list and histories reviewed & adjusted, as indicated.  Additional history: as documented    Patient Active Problem List   Diagnosis     Hypertension, goal below 140/90     Hyperlipidemia LDL goal <130     GERD (gastroesophageal reflux disease)     Seborrheic dermatitis     Prediabetes     Past Surgical History:   Procedure Laterality Date     APPENDECTOMY       CARPAL TUNNEL RELEASE RT/LT Right      COLONOSCOPY WITH CO2 INSUFFLATION N/A 2016    Procedure: COLONOSCOPY WITH CO2 INSUFFLATION;  Surgeon: Duane, William Charles, MD;  Location:  OR       Social History   Substance Use Topics     Smoking status: Never Smoker     Smokeless tobacco: Not on file     Alcohol use No     Family History   Problem Relation Age of Onset     DIABETES Mother      Hyperlipidemia Mother      Hypertension Mother      Hypertension Sister      Hypertension Brother      Hyperlipidemia Brother      CEREBROVASCULAR DISEASE Brother      Breast Cancer Maternal Aunt      in her 40s,      Colon Cancer No family hx of              Reviewed and updated as needed this visit by clinical staffTobacco  Allergies  Meds        Reviewed and updated as needed this visit by Provider         ROS:  Constitutional, neuro, musculoskeletal, integument systems are negative, except as otherwise noted.      OBJECTIVE:                                                    /83  Pulse 76  Temp 98.2  F (36.8  C) (Oral)  Wt 149 lb 3.2 oz (67.7 kg)  BMI 28.19 kg/m2  Body mass index is 28.19 kg/(m^2).  GENERAL APPEARANCE: healthy, alert and no distress  MS: extremities normal- no gross deformities noted  ORTHO: Knee Exam: Inspection: No effusion  Tender: lateral patellar facet, medial patellar facet, inferior pole patella, patella tendon, popliteal region  Active Range of Motion: all normal  Strength: ALL NORMAL  Special tests: normal Valgus stress test, normal Varus, negative Lachman's test, negative posterior drawer    PSYCH: mentation appears normal and affect normal/bright    Diagnostic test results:  Diagnostic Test Results:  Xray - neg       ASSESSMENT:                                                      1. Acute pain of left knee    2. Encounter for screening mammogram for breast cancer    3. Hyperlipidemia LDL goal <130         PLAN:                                                    Likely a sprain/strain. Diclofenac BID x7 days. Supportive therapy also discussed. Follow up if symptoms fail to improve or worsen. Physical therapy if no improvements.    The patient was in agreement with the plan today and had no questions or concerns prior to leaving the clinic.     Laura Bentley PA-C  [facility]

## 2017-10-06 ENCOUNTER — RADIANT APPOINTMENT (OUTPATIENT)
Dept: GENERAL RADIOLOGY | Facility: CLINIC | Age: 52
End: 2017-10-06
Attending: PHYSICIAN ASSISTANT
Payer: COMMERCIAL

## 2017-10-06 ENCOUNTER — OFFICE VISIT (OUTPATIENT)
Dept: FAMILY MEDICINE | Facility: CLINIC | Age: 52
End: 2017-10-06
Payer: COMMERCIAL

## 2017-10-06 VITALS
DIASTOLIC BLOOD PRESSURE: 87 MMHG | OXYGEN SATURATION: 98 % | SYSTOLIC BLOOD PRESSURE: 128 MMHG | WEIGHT: 150.5 LBS | BODY MASS INDEX: 28.44 KG/M2 | HEART RATE: 80 BPM | TEMPERATURE: 98 F

## 2017-10-06 DIAGNOSIS — R07.89 CHEST WALL PAIN: Primary | ICD-10-CM

## 2017-10-06 DIAGNOSIS — R07.89 CHEST WALL PAIN: ICD-10-CM

## 2017-10-06 DIAGNOSIS — S23.41XA SPRAIN OF COSTOCHONDRAL JOINT, INITIAL ENCOUNTER: ICD-10-CM

## 2017-10-06 PROCEDURE — 99214 OFFICE O/P EST MOD 30 MIN: CPT | Performed by: PHYSICIAN ASSISTANT

## 2017-10-06 PROCEDURE — 71101 X-RAY EXAM UNILAT RIBS/CHEST: CPT | Mod: LT

## 2017-10-06 RX ORDER — DICLOFENAC SODIUM 75 MG/1
75 TABLET, DELAYED RELEASE ORAL 2 TIMES DAILY PRN
Qty: 30 TABLET | Refills: 1 | Status: SHIPPED | OUTPATIENT
Start: 2017-10-06 | End: 2018-05-14

## 2017-10-06 RX ORDER — HYDROCODONE BITARTRATE AND ACETAMINOPHEN 5; 325 MG/1; MG/1
1-2 TABLET ORAL
Qty: 25 TABLET | Refills: 0 | Status: SHIPPED | OUTPATIENT
Start: 2017-10-06 | End: 2018-05-14

## 2017-10-06 NOTE — MR AVS SNAPSHOT
"              After Visit Summary   10/6/2017    Mercedes Long    MRN: [id number]           Patient Information     Date Of Birth          1965        Visit Information        Provider Department      10/6/2017 11:30 AM Rodo Robles PA-C; [employer] [facility] [place]        Today's Diagnoses     Chest wall pain    -  1    Sprain of costochondral joint, initial encounter           Follow-ups after your visit        Who to contact     Normal or non-critical lab and imaging results will be communicated to you by Emefcyhart, letter or phone within 4 business days after the clinic has received the results. If you do not hear from us within 7 days, please contact the clinic through [email or web address] or phone. If you have a critical or abnormal lab result, we will notify you by phone as soon as possible.  Submit refill requests through "[employer] or call your pharmacy and they will forward the refill request to us. Please allow 3 business days for your refill to be completed.          If you need to speak with a  for additional information , please call: [phone]             Additional Information About Your Visit        "[employer] Information     "[employer] lets you send messages to your doctor, view your test results, renew your prescriptions, schedule appointments and more. To sign up, go to [email or web address] . Click on \"Log in\" on the left side of the screen, which will take you to the Welcome page. Then click on \"Sign up Now\" on the right side of the page.     You will be asked to enter the access code listed below, as well as some personal information. Please follow the directions to create your username and password.     Your access code is: [email or web address]  Expires: 2017 10:26 AM     Your access code will  in 90 days. If you need help or a new code, please call your [place] clinic or [phone].        Care EveryWhere ID     This is your Care EveryWhere ID. This " could be used by other organizations to access your [place] medical records  [id number]        Your Vitals Were     Pulse Temperature Pulse Oximetry BMI (Body Mass Index)          80 98  F (36.7  C) (Oral) 98% 28.44 kg/m2         Blood Pressure from Last 3 Encounters:   10/06/17 128/87   09/21/17 120/83   05/23/17 122/84    Weight from Last 3 Encounters:   10/06/17 150 lb 8 oz (68.3 kg)   09/21/17 149 lb 3.2 oz (67.7 kg)   05/23/17 143 lb 12.8 oz (65.2 kg)                 Today's Medication Changes          These changes are accurate as of: 10/6/17  1:05 PM.  If you have any questions, ask your nurse or doctor.               Start taking these medicines.        Dose/Directions    HYDROcodone-acetaminophen 5-325 MG per tablet   Commonly known as:  NORCO   Used for:  Chest wall pain, Sprain of costochondral joint, initial encounter   Started by:  Rodo Robles PA-C        Dose:  1-2 tablet   Take 1-2 tablets by mouth nightly as needed for moderate to severe pain   Quantity:  25 tablet   Refills:  0         These medicines have changed or have updated prescriptions.        Dose/Directions    * diclofenac 75 MG EC tablet   Commonly known as:  VOLTAREN   This may have changed:  Another medication with the same name was added. Make sure you understand how and when to take each.   Used for:  Acute pain of left knee   Changed by:  Laura Bentley PA-C        Dose:  75 mg   Take 1 tablet (75 mg) by mouth 2 times daily for 7 days   Quantity:  14 tablet   Refills:  0       * diclofenac 75 MG EC tablet   Commonly known as:  VOLTAREN   This may have changed:  You were already taking a medication with the same name, and this prescription was added. Make sure you understand how and when to take each.   Used for:  Sprain of costochondral joint, initial encounter, Chest wall pain   Changed by:  Rodo Robles PA-C        Dose:  75 mg   Take 1 tablet (75 mg) by mouth 2 times daily as needed for moderate pain    Quantity:  30 tablet   Refills:  1       * Notice:  This list has 2 medication(s) that are the same as other medications prescribed for you. Read the directions carefully, and ask your doctor or other care provider to review them with you.         Where to get your medicines      These medications were sent to [facility] - [address]  [address] [place] [address]     Phone:  [phone]     diclofenac 75 MG EC tablet         Some of these will need a paper prescription and others can be bought over the counter.  Ask your nurse if you have questions.     Bring a paper prescription for each of these medications     HYDROcodone-acetaminophen 5-325 MG per tablet                Primary Care Provider Fax #    Physician No Ref-Primary [phone]       No address on file        Equal Access to Services     MIRNA LOFTON : Humberto keaneo Stephan, waaxda luqadaha, qaybta kaalmada adejeremyyakimi, corey valadez. So [facility] [phone].    ATENCIÓN: Si habla español, tiene a garland disposición servicios gratuitos de asistencia lingüística. Llame al [phone].    We comply with applicable federal civil rights laws and [place] laws. We do not discriminate on the basis of race, color, national origin, age, disability, sex, sexual orientation, or gender identity.            Thank you!     Thank you for choosing [facility]  for your care. Our goal is always to provide you with excellent care. Hearing back from our patients is one way we can continue to improve our services. Please take a few minutes to complete the written survey that you may receive in the mail after your visit with us. Thank you!             Your Updated Medication List - Protect others around you: Learn how to safely use, store and throw away your medicines at www.disposemymeds.org.          This list is accurate as of: 10/6/17  1:05 PM.  Always use your most recent  med list.                   Brand Name Dispense Instructions for use Diagnosis    amLODIPine 10 MG tablet    NORVASC    90 tablet    TAKE 1 TABLET BY MOUTH EVERY DAY    Hypertension, goal below 140/90       aspirin 81 MG tablet      Take by mouth daily        * diclofenac 75 MG EC tablet    VOLTAREN    14 tablet    Take 1 tablet (75 mg) by mouth 2 times daily for 7 days    Acute pain of left knee       * diclofenac 75 MG EC tablet    VOLTAREN    30 tablet    Take 1 tablet (75 mg) by mouth 2 times daily as needed for moderate pain    Sprain of costochondral joint, initial encounter, Chest wall pain       fluticasone 50 MCG/ACT spray    FLONASE    1 Bottle    Spray 1-2 sprays into both nostrils daily    Allergic rhinitis with postnasal drip       HYDROcodone-acetaminophen 5-325 MG per tablet    NORCO    25 tablet    Take 1-2 tablets by mouth nightly as needed for moderate to severe pain    Chest wall pain, Sprain of costochondral joint, initial encounter       omeprazole 40 MG capsule    priLOSEC    90 capsule    Take 1 capsule (40 mg) by mouth daily as needed Take 30-60 minutes before a meal.    Gastroesophageal reflux disease without esophagitis       simvastatin 20 MG tablet    ZOCOR    90 tablet    Take 1 tablet (20 mg) by mouth At Bedtime    Hyperlipidemia LDL goal <130       * Notice:  This list has 2 medication(s) that are the same as other medications prescribed for you. Read the directions carefully, and ask your doctor or other care provider to review them with you.

## 2017-10-06 NOTE — NURSING NOTE
"Chief Complaint   Patient presents with     rib pain     X 1 week       Initial /87  Pulse 80  Temp 98  F (36.7  C) (Oral)  Wt 150 lb 8 oz (68.3 kg)  SpO2 98%  BMI 28.44 kg/m2 Estimated body mass index is 28.44 kg/(m^2) as calculated from the following:    Height as of 5/23/17: 5' 1\" (1.549 m).    Weight as of this encounter: 150 lb 8 oz (68.3 kg).  Medication Reconciliation: complete   Angel Hester MA      "

## 2017-10-06 NOTE — PROGRESS NOTES
SUBJECTIVE:   Mercedes Long is a 52 year old female who presents to clinic today for the following health issues:        Joint Pain    Onset: 1 week    Description:   Location: L sided rib pain  Character: Sharp    Intensity: moderate    Progression of Symptoms: worse    Accompanying Signs & Symptoms:  Other symptoms: none    History:   Previous similar pain: no       Precipitating factors:   Trauma or overuse: YES- turning over in bed    Alleviating factors:  Improved by: nothing and Ibuprofen    Therapies Tried and outcome: OTC medication with mild relief           No cold symptoms or fever.  No weight changes or noc sweats.   Some fatigue.   Symptoms x 1 week. Left sided.   Pain worse with movement. No rash. No pleuritic chest pain     Problem list and histories reviewed & adjusted, as indicated.  Additional history: as documented    Patient Active Problem List   Diagnosis     Hypertension, goal below 140/90     Hyperlipidemia LDL goal <130     GERD (gastroesophageal reflux disease)     Seborrheic dermatitis     Prediabetes     Past Surgical History:   Procedure Laterality Date     APPENDECTOMY       CARPAL TUNNEL RELEASE RT/LT Right      COLONOSCOPY WITH CO2 INSUFFLATION N/A 2016    Procedure: COLONOSCOPY WITH CO2 INSUFFLATION;  Surgeon: Duane, William Charles, MD;  Location:  OR       Social History   Substance Use Topics     Smoking status: Never Smoker     Smokeless tobacco: Not on file     Alcohol use No     Family History   Problem Relation Age of Onset     DIABETES Mother      Hyperlipidemia Mother      Hypertension Mother      Hypertension Sister      Hypertension Brother      Hyperlipidemia Brother      CEREBROVASCULAR DISEASE Brother      Breast Cancer Maternal Aunt      in her 40s,      Colon Cancer No family hx of          Recent Labs   Lab Test  17   1011  05/10/16   0859  10/13/15 01/19/15   A1C   --    --    --    --   6.1*   LDL  129*  62   --   129  159   HDL  97  87   --    61  74   TRIG  133  89   --   157  137   ALT  37   --    --   27  32   CR  0.62  0.58   < >   --   0.7   GFRESTIMATED  >90  Non  GFR Calc    >90  Non  GFR Calc     < >   --   94   GFRESTBLACK  >90   GFR Calc    >90   GFR Calc     < >   --    --    POTASSIUM  4.0  3.9   < >   --   4.0   TSH   --    --    --    --   1.26    < > = values in this interval not displayed.      BP Readings from Last 3 Encounters:   10/06/17 128/87   09/21/17 120/83   05/23/17 122/84    Wt Readings from Last 3 Encounters:   10/06/17 150 lb 8 oz (68.3 kg)   09/21/17 149 lb 3.2 oz (67.7 kg)   05/23/17 143 lb 12.8 oz (65.2 kg)                          [facility] and [facility]  Meds  Med Hx  Surg Hx  Fam Hx  Soc Hx      Reviewed and updated as needed this visit by Provider         All other systems negative except as outline above  OBJECTIVE:  Eye exam - right eye normal lid, conjunctiva, cornea, pupil and fundus, left eye normal lid, conjunctiva, cornea, pupil and fundus.  Thyroid not palpable, not enlarged, no nodules detected.  CHEST:chest clear to IPPA, no tachypnea, retractions or cyanosis and S1, S2 normal, no murmur, no gallop, rate regular. Tenderness involving anterior and inferior chest wall. No crepitations.   No edema   No rash    Mercedes was seen today for rib pain.    Diagnoses and all orders for this visit:    Chest wall pain  -     XR Ribs & Chest Left G/E 3 Views; Future  -     diclofenac (VOLTAREN) 75 MG EC tablet; Take 1 tablet (75 mg) by mouth 2 times daily as needed for moderate pain    Sprain of costochondral joint, initial encounter  -     diclofenac (VOLTAREN) 75 MG EC tablet; Take 1 tablet (75 mg) by mouth 2 times daily as needed for moderate pain      Advised supportive and symptomatic treatment.  Follow up with Provider - if condition persists or worsens.

## 2017-11-17 DIAGNOSIS — I10 HYPERTENSION, GOAL BELOW 140/90: ICD-10-CM

## 2017-11-17 RX ORDER — AMLODIPINE BESYLATE 10 MG/1
TABLET ORAL
Qty: 90 TABLET | Refills: 1 | Status: SHIPPED | OUTPATIENT
Start: 2017-11-17 | End: 2018-03-14

## 2017-12-15 ENCOUNTER — TELEPHONE (OUTPATIENT)
Dept: FAMILY MEDICINE | Facility: CLINIC | Age: 52
End: 2017-12-15

## 2017-12-15 NOTE — TELEPHONE ENCOUNTER
Panel Management Review      Patient has the following on her problem list: None      Composite cancer screening  Chart review shows that this patient is due/due soon for the following Mammogram  Summary:    Patient is due/failing the following:   MAMMOGRAM    Action needed:   Needs appt made for mammogram    Type of outreach:    Sent letter.    Questions for provider review:    None                                                                                                                                    Naida Rose MA       Chart routed to Care Team .

## 2017-12-15 NOTE — LETTER
[facility] [place]  [address] [place] [address]  [phone]        December 15, 2017    Mercedes Long  [address] [place] [address]              Dear Mercedes Long    This is to remind you that your mammogram is due.    You may call our office at [phone] to schedule an appointment.    Please disregard this notice if you have already made an appointment.        Sincerely,    [facility]

## 2018-03-14 ENCOUNTER — OFFICE VISIT (OUTPATIENT)
Dept: INTERNAL MEDICINE | Facility: CLINIC | Age: 53
End: 2018-03-14
Payer: COMMERCIAL

## 2018-03-14 VITALS
BODY MASS INDEX: 27.78 KG/M2 | DIASTOLIC BLOOD PRESSURE: 80 MMHG | TEMPERATURE: 97.5 F | OXYGEN SATURATION: 96 % | SYSTOLIC BLOOD PRESSURE: 119 MMHG | RESPIRATION RATE: 16 BRPM | WEIGHT: 147 LBS | HEART RATE: 84 BPM

## 2018-03-14 DIAGNOSIS — J30.9 ALLERGIC RHINITIS WITH POSTNASAL DRIP: Primary | ICD-10-CM

## 2018-03-14 DIAGNOSIS — E78.5 HYPERLIPIDEMIA LDL GOAL <130: ICD-10-CM

## 2018-03-14 DIAGNOSIS — R09.82 ALLERGIC RHINITIS WITH POSTNASAL DRIP: Primary | ICD-10-CM

## 2018-03-14 DIAGNOSIS — Z12.31 ENCOUNTER FOR SCREENING MAMMOGRAM FOR BREAST CANCER: ICD-10-CM

## 2018-03-14 DIAGNOSIS — G47.09 OTHER INSOMNIA: ICD-10-CM

## 2018-03-14 DIAGNOSIS — I10 HYPERTENSION, GOAL BELOW 140/90: ICD-10-CM

## 2018-03-14 DIAGNOSIS — G47.10 EXCESSIVE SLEEPINESS: ICD-10-CM

## 2018-03-14 PROCEDURE — 99214 OFFICE O/P EST MOD 30 MIN: CPT | Performed by: INTERNAL MEDICINE

## 2018-03-14 RX ORDER — LORATADINE 10 MG/1
10 TABLET ORAL DAILY PRN
Qty: 30 TABLET | Refills: 1 | Status: SHIPPED | OUTPATIENT
Start: 2018-03-14 | End: 2018-05-14

## 2018-03-14 RX ORDER — AMLODIPINE BESYLATE 10 MG/1
10 TABLET ORAL DAILY
Qty: 90 TABLET | Refills: 3 | Status: SHIPPED | OUTPATIENT
Start: 2018-03-14 | End: 2019-03-15

## 2018-03-14 RX ORDER — FLUTICASONE PROPIONATE 50 MCG
1-2 SPRAY, SUSPENSION (ML) NASAL DAILY
Qty: 1 BOTTLE | Refills: 0 | Status: SHIPPED | OUTPATIENT
Start: 2018-03-14 | End: 2018-05-14

## 2018-03-14 RX ORDER — SIMVASTATIN 20 MG
20 TABLET ORAL AT BEDTIME
Qty: 90 TABLET | Refills: 1 | Status: SHIPPED | OUTPATIENT
Start: 2018-03-14 | End: 2018-10-08

## 2018-03-14 NOTE — MR AVS SNAPSHOT
"              After Visit Summary   3/14/2018    Mercedes Long    MRN: [id number]           Patient Information     Date Of Birth          1965        Visit Information        Provider Department      3/14/2018 9:45 AM Jef Mckeon MD; [employer] [facility] [place]        Today's Diagnoses     Encounter for screening mammogram for breast cancer    -  1    Hypertension, goal below 140/90        Hyperlipidemia LDL goal <130        Allergic rhinitis with postnasal drip        Other insomnia        Excessive sleepiness          Care Instructions       Sleep journal x 2 weeks     Intranasal corticosteroid twice daily and antihistamine daily until symptoms improve then daily as needed          Follow-ups after your visit        Future tests that were ordered for you today     Open Future Orders        Priority Expected Expires Ordered    *MA Screening Digital Bilateral Routine  3/14/2019 3/14/2018            Who to contact     Normal or non-critical lab and imaging results will be communicated to you by Infectioushart, letter or phone within 4 business days after the clinic has received the results. If you do not hear from us within 7 days, please contact the clinic through MyChart or phone. If you have a critical or abnormal lab result, we will notify you by phone as soon as possible.  Submit refill requests through [email or web address] or call your pharmacy and they will forward the refill request to us. Please allow 3 business days for your refill to be completed.          If you need to speak with a  for additional information , please call: [phone]             Additional Information About Your Visit        [employer] Information     [email or web address] lets you send messages to your doctor, view your test results, renew your prescriptions, schedule appointments and more. To sign up, go to [email or web address] . Click on \"Log in\" on the left side of the screen, which will take you to the Welcome " "page. Then click on \"Sign up Now\" on the right side of the page.     You will be asked to enter the access code listed below, as well as some personal information. Please follow the directions to create your username and password.     Your access code is: [email or web address]  Expires: 2018 10:52 AM     Your access code will  in 90 days. If you need help or a new code, please call your [place] clinic or [phone].        Care EveryWhere ID     This is your Care EveryWhere ID. This could be used by other organizations to access your [place] medical records  [id number]        Your Vitals Were     Pulse Temperature Respirations Pulse Oximetry BMI (Body Mass Index)       84 97.5  F (36.4  C) (Tympanic) 16 96% 27.78 kg/m2        Blood Pressure from Last 3 Encounters:   18 119/80   10/06/17 128/87   17 120/83    Weight from Last 3 Encounters:   18 147 lb (66.7 kg)   10/06/17 150 lb 8 oz (68.3 kg)   17 149 lb 3.2 oz (67.7 kg)                 Today's Medication Changes          These changes are accurate as of 3/14/18 10:52 AM.  If you have any questions, ask your nurse or doctor.               Start taking these medicines.        Dose/Directions    loratadine 10 MG tablet   Commonly known as:  CLARITIN   Used for:  Allergic rhinitis with postnasal drip   Started by:  Jef Mckeon MD        Dose:  10 mg   Take 1 tablet (10 mg) by mouth daily as needed for allergies   Quantity:  30 tablet   Refills:  1         These medicines have changed or have updated prescriptions.        Dose/Directions    amLODIPine 10 MG tablet   Commonly known as:  NORVASC   This may have changed:  See the new instructions.   Used for:  Hypertension, goal below 140/90   Changed by:  Jef Mckeon MD        Dose:  10 mg   Take 1 tablet (10 mg) by mouth daily   Quantity:  90 tablet   Refills:  3            Where to get your medicines      These medications were sent to [facility] [address] [address] " [facility] [address]  [address], [place] [address]     Phone:  [phone]     amLODIPine 10 MG tablet    fluticasone 50 MCG/ACT spray    loratadine 10 MG tablet    simvastatin 20 MG tablet                Primary Care Provider Fax #    Physician No Ref-Primary [phone]       No address on file        Equal Access to Services     [facility] [place] : Hadii aad ku hadasho Soomaali, waaxda luqadaha, qaybta kaalmada adeegyada, waxay idiin hayaan adeeg kharash la'aan . So [facility] [phone].    ATENCIÓN: Si habla español, tiene a garland disposición servicios gratuitos de asistencia lingüística. Pau al [phone].    We comply with applicable federal civil rights laws and [place] laws. We do not discriminate on the basis of race, color, national origin, age, disability, sex, sexual orientation, or gender identity.            Thank you!     Thank you for choosing [facility]  for your care. Our goal is always to provide you with excellent care. Hearing back from our patients is one way we can continue to improve our services. Please take a few minutes to complete the written survey that you may receive in the mail after your visit with us. Thank you!             Your Updated Medication List - Protect others around you: Learn how to safely use, store and throw away your medicines at www.disposemymeds.org.          This list is accurate as of 3/14/18 10:52 AM.  Always use your most recent med list.                   Brand Name Dispense Instructions for use Diagnosis    amLODIPine 10 MG tablet    NORVASC    90 tablet    Take 1 tablet (10 mg) by mouth daily    Hypertension, goal below 140/90       aspirin 81 MG tablet      Take by mouth daily        diclofenac 75 MG EC tablet    VOLTAREN    30 tablet    Take 1 tablet (75 mg) by mouth 2 times daily as needed for moderate pain    Sprain of costochondral joint, initial encounter, Chest wall pain       fluticasone 50 MCG/ACT spray     FLONASE    1 Bottle    Spray 1-2 sprays into both nostrils daily    Allergic rhinitis with postnasal drip       HYDROcodone-acetaminophen 5-325 MG per tablet    NORCO    25 tablet    Take 1-2 tablets by mouth nightly as needed for moderate to severe pain    Chest wall pain, Sprain of costochondral joint, initial encounter       loratadine 10 MG tablet    CLARITIN    30 tablet    Take 1 tablet (10 mg) by mouth daily as needed for allergies    Allergic rhinitis with postnasal drip       omeprazole 40 MG capsule    priLOSEC    90 capsule    Take 1 capsule (40 mg) by mouth daily as needed Take 30-60 minutes before a meal.    Gastroesophageal reflux disease without esophagitis       simvastatin 20 MG tablet    ZOCOR    90 tablet    Take 1 tablet (20 mg) by mouth At Bedtime    Hyperlipidemia LDL goal <130

## 2018-03-14 NOTE — PATIENT INSTRUCTIONS
Sleep journal x 2 weeks     Intranasal corticosteroid twice daily and antihistamine daily until symptoms improve then daily as needed

## 2018-03-14 NOTE — NURSING NOTE
"Chief Complaint   Patient presents with     Sinus Problem     Sleep Problem       Initial /80 (BP Location: Left arm, Patient Position: Sitting, Cuff Size: Adult Regular)  Pulse 84  Temp 97.5  F (36.4  C) (Tympanic)  Resp 16  Wt 147 lb (66.7 kg)  SpO2 96%  BMI 27.78 kg/m2 Estimated body mass index is 27.78 kg/(m^2) as calculated from the following:    Height as of 5/23/17: 5' 1\" (1.549 m).    Weight as of this encounter: 147 lb (66.7 kg).  Medication Reconciliation: complete    "

## 2018-05-14 ENCOUNTER — OFFICE VISIT (OUTPATIENT)
Dept: FAMILY MEDICINE | Facility: CLINIC | Age: 53
End: 2018-05-14
Payer: COMMERCIAL

## 2018-05-14 VITALS
WEIGHT: 149.2 LBS | TEMPERATURE: 98.1 F | BODY MASS INDEX: 28.17 KG/M2 | SYSTOLIC BLOOD PRESSURE: 124 MMHG | OXYGEN SATURATION: 99 % | HEART RATE: 79 BPM | HEIGHT: 61 IN | RESPIRATION RATE: 16 BRPM | DIASTOLIC BLOOD PRESSURE: 80 MMHG

## 2018-05-14 DIAGNOSIS — I10 BENIGN ESSENTIAL HYPERTENSION: ICD-10-CM

## 2018-05-14 DIAGNOSIS — E55.9 VITAMIN D DEFICIENCY: ICD-10-CM

## 2018-05-14 DIAGNOSIS — Z13.220 SCREENING FOR LIPOID DISORDERS: ICD-10-CM

## 2018-05-14 DIAGNOSIS — M79.606 LOWER EXTREMITY PAIN, DIFFUSE, UNSPECIFIED LATERALITY: Primary | ICD-10-CM

## 2018-05-14 DIAGNOSIS — M54.10 RADICULAR LEG PAIN: ICD-10-CM

## 2018-05-14 LAB
ANION GAP SERPL CALCULATED.3IONS-SCNC: 5 MMOL/L (ref 3–14)
BUN SERPL-MCNC: 12 MG/DL (ref 7–30)
CALCIUM SERPL-MCNC: 8.4 MG/DL (ref 8.5–10.1)
CHLORIDE SERPL-SCNC: 106 MMOL/L (ref 94–109)
CHOLEST SERPL-MCNC: 190 MG/DL
CO2 SERPL-SCNC: 29 MMOL/L (ref 20–32)
CREAT SERPL-MCNC: 0.5 MG/DL (ref 0.52–1.04)
GFR SERPL CREATININE-BSD FRML MDRD: >90 ML/MIN/1.7M2
GLUCOSE SERPL-MCNC: 120 MG/DL (ref 70–99)
HDLC SERPL-MCNC: 74 MG/DL
LDLC SERPL CALC-MCNC: 91 MG/DL
NONHDLC SERPL-MCNC: 116 MG/DL
POTASSIUM SERPL-SCNC: 4.1 MMOL/L (ref 3.4–5.3)
SODIUM SERPL-SCNC: 140 MMOL/L (ref 133–144)
TRIGL SERPL-MCNC: 124 MG/DL

## 2018-05-14 PROCEDURE — 80048 BASIC METABOLIC PNL TOTAL CA: CPT | Performed by: NURSE PRACTITIONER

## 2018-05-14 PROCEDURE — 80061 LIPID PANEL: CPT | Performed by: NURSE PRACTITIONER

## 2018-05-14 PROCEDURE — 36415 COLL VENOUS BLD VENIPUNCTURE: CPT | Performed by: NURSE PRACTITIONER

## 2018-05-14 PROCEDURE — 82306 VITAMIN D 25 HYDROXY: CPT | Performed by: NURSE PRACTITIONER

## 2018-05-14 PROCEDURE — 99213 OFFICE O/P EST LOW 20 MIN: CPT | Performed by: NURSE PRACTITIONER

## 2018-05-14 RX ORDER — PREDNISONE 20 MG/1
40 TABLET ORAL DAILY
Qty: 10 TABLET | Refills: 0 | Status: SHIPPED | OUTPATIENT
Start: 2018-05-14 | End: 2018-05-19

## 2018-05-14 RX ORDER — HYDROCODONE BITARTRATE AND ACETAMINOPHEN 5; 325 MG/1; MG/1
1 TABLET ORAL
Qty: 18 TABLET | Refills: 0 | Status: SHIPPED | OUTPATIENT
Start: 2018-05-14 | End: 2021-08-09

## 2018-05-14 NOTE — PROGRESS NOTES
SUBJECTIVE:   Mercedes Long is a 52 year old female who presents to clinic today for the following health issues:      Muscle/Joint Pain     Onset: 10 days    Description:   Location: left and right leg pain; L>R. L eg pain from hip to calf to pinky toe.  R leg pain behind knee cap. Cannot sleep d/t pain.  Pain feels deep, not muscular, no restless legs.   Character: Dull ache- worse with standing    Progression of Symptoms: worse    Accompanying Signs & Symptoms:  Other symptoms: radiation of pain to whole leg, weakness felt in R knee cap    Precipitating factors:   Trauma or overuse: no- stands a lot, works in restaurant.   Therapies Tried and outcome: none       Problem list and histories reviewed & adjusted, as indicated.  Additional history: as documented    Patient Active Problem List   Diagnosis     Hypertension, goal below 140/90     Hyperlipidemia LDL goal <130     GERD (gastroesophageal reflux disease)     Seborrheic dermatitis     Prediabetes     Past Surgical History:   Procedure Laterality Date     APPENDECTOMY       CARPAL TUNNEL RELEASE RT/LT Right      COLONOSCOPY WITH CO2 INSUFFLATION N/A 2016    Procedure: COLONOSCOPY WITH CO2 INSUFFLATION;  Surgeon: Duane, William Charles, MD;  Location:  OR       Social History   Substance Use Topics     Smoking status: Never Smoker     Smokeless tobacco: Never Used     Alcohol use No     Family History   Problem Relation Age of Onset     DIABETES Mother      Hyperlipidemia Mother      Hypertension Mother      Hypertension Sister      Hypertension Brother      Hyperlipidemia Brother      CEREBROVASCULAR DISEASE Brother      Breast Cancer Maternal Aunt      in her 40s,      Colon Cancer No family hx of          Current Outpatient Prescriptions   Medication Sig Dispense Refill     amLODIPine (NORVASC) 10 MG tablet Take 1 tablet (10 mg) by mouth daily 90 tablet 3     aspirin 81 MG tablet Take by mouth daily       HYDROcodone-acetaminophen (NORCO)  "5-325 MG per tablet Take 1 tablet by mouth nightly as needed for moderate to severe pain 18 tablet 0     predniSONE (DELTASONE) 20 MG tablet Take 2 tablets (40 mg) by mouth daily for 5 days 10 tablet 0     simvastatin (ZOCOR) 20 MG tablet Take 1 tablet (20 mg) by mouth At Bedtime 90 tablet 1     No Known Allergies  BP Readings from Last 3 Encounters:   05/14/18 124/80   03/14/18 119/80   10/06/17 128/87    Wt Readings from Last 3 Encounters:   05/14/18 149 lb 3.2 oz (67.7 kg)   03/14/18 147 lb (66.7 kg)   10/06/17 150 lb 8 oz (68.3 kg)                  Labs reviewed in EPIC    Reviewed and updated as needed this visit by clinical staff  Tobacco  Allergies  Meds  Med Hx  Surg Hx  Fam Hx  Soc Hx      Reviewed and updated as needed this visit by Provider         ROS:  Constitutional, HEENT, cardiovascular, pulmonary, GI, , musculoskeletal, neuro, skin, endocrine and psych systems are negative, except as otherwise noted.    OBJECTIVE:     /80  Pulse 79  Temp 98.1  F (36.7  C) (Oral)  Resp 16  Ht 5' 1.22\" (1.555 m)  Wt 149 lb 3.2 oz (67.7 kg)  SpO2 99%  BMI 27.99 kg/m2  Body mass index is 27.99 kg/(m^2).  GENERAL: healthy, alert and no distress  RESP: lungs clear to auscultation - no rales, rhonchi or wheezes  CV: regular rate and rhythm, normal S1 S2, no S3 or S4, no murmur, click or rub, no peripheral edema and peripheral pulses strong  MS: no gross musculoskeletal defects noted, no edema No pain with downward or inward pressure applied to pelvis, no back pain with dependent leg raise.  No pain with palpation of calf. CMS intact bilat.  No varicosities, no redness/ warmth  NEURO: Normal strength and tone, mentation intact and speech normal      Diagnostic Test Results:  See orders    ASSESSMENT/PLAN:         ICD-10-CM    1. Lower extremity pain, diffuse, unspecified laterality M79.606 Basic metabolic panel  (Ca, Cl, CO2, Creat, Gluc, K, Na, BUN)     Vitamin D Deficiency     " HYDROcodone-acetaminophen (NORCO) 5-325 MG per tablet     CANCELED: *MA Screening Digital Bilateral    bilateral L>R   2. Screening for lipoid disorders Z13.220 Lipid panel reflex to direct LDL Fasting   3. Benign essential hypertension I10 Basic metabolic panel  (Ca, Cl, CO2, Creat, Gluc, K, Na, BUN)   4. Radicular leg pain M54.10 predniSONE (DELTASONE) 20 MG tablet       See Patient Instructions: Get Dr. Arvizu's shoe inserts for added support while walking.  Wear compression stockings when you need to stand for long periods of time. Take prednisone for radicular leg pain.  Norco at night if needed for significant pain- take sparingly, can be habit forming. I will let you know if your labs are abnormal. Follow up as needed for peristant or worsening symptoms.       Audra Gramajo, P  [facility] [place]

## 2018-05-14 NOTE — NURSING NOTE
"Chief Complaint   Patient presents with     Pain       Initial /80  Pulse 79  Temp 98.1  F (36.7  C) (Oral)  Resp 16  Ht 5' 1.22\" (1.555 m)  Wt 149 lb 3.2 oz (67.7 kg)  SpO2 99%  BMI 27.99 kg/m2 Estimated body mass index is 27.99 kg/(m^2) as calculated from the following:    Height as of this encounter: 5' 1.22\" (1.555 m).    Weight as of this encounter: 149 lb 3.2 oz (67.7 kg).  Medication Reconciliation: complete     Michelle Infante MA  "

## 2018-05-14 NOTE — LETTER
May 21, 2018      Mercedes Long  [address]  [place] [address]        Dear ,    We are writing to inform you of your test results.    Your vitamin D level is low as we suspected, supplement sent in.  Additionally, a few vitamin levels are slightly low, consider taking a daily multivitamin (with food) daily.   Follow up as needed.     Resulted Orders   Lipid panel reflex to direct LDL Fasting   Result Value Ref Range    Cholesterol 190 <200 mg/dL    Triglycerides 124 <150 mg/dL      Comment:      Fasting specimen    HDL Cholesterol 74 >49 mg/dL    LDL Cholesterol Calculated 91 <100 mg/dL      Comment:      Desirable:       <100 mg/dl    Non HDL Cholesterol 116 <130 mg/dL   Basic metabolic panel  (Ca, Cl, CO2, Creat, Gluc, K, Na, BUN)   Result Value Ref Range    Sodium 140 133 - 144 mmol/L    Potassium 4.1 3.4 - 5.3 mmol/L    Chloride 106 94 - 109 mmol/L    Carbon Dioxide 29 20 - 32 mmol/L    Anion Gap 5 3 - 14 mmol/L    Glucose 120 (H) 70 - 99 mg/dL      Comment:      Fasting specimen    Urea Nitrogen 12 7 - 30 mg/dL    Creatinine 0.50 (L) 0.52 - 1.04 mg/dL    GFR Estimate >90 >60 mL/min/1.7m2      Comment:      Non  GFR Calc    GFR Estimate If Black >90 >60 mL/min/1.7m2      Comment:       GFR Calc    Calcium 8.4 (L) 8.5 - 10.1 mg/dL   Vitamin D Deficiency   Result Value Ref Range    Vitamin D Deficiency screening 16 (L) 20 - 75 ug/L      Comment:      Season, race, dietary intake, and treatment affect the concentration of   25-hydroxy-Vitamin D. Values may decrease during winter months and increase   during summer months. Values 20-29 ug/L may indicate Vitamin D insufficiency   and values <20 ug/L may indicate Vitamin D deficiency.  Vitamin D determination is routinely performed by an immunoassay specific for   25 hydroxyvitamin D3.  If an individual is on vitamin D2 (ergocalciferol)   supplementation, please specify 25 OH vitamin D2 and D3 level determination by    LCMSMS test [id number].         If you have any questions or concerns, please call the clinic at the number listed above.       Sincerely,    Audra Gramajo NP/wilfrid

## 2018-05-14 NOTE — MR AVS SNAPSHOT
After Visit Summary   5/14/2018    Mercedes Long    MRN: [id number]           Patient Information     Date Of Birth          1965        Visit Information        Provider Department      5/14/2018 7:45 AM Audra Gramajo NP; [facility]        Today's Diagnoses     Screening for lipoid disorders    -  1    Benign essential hypertension        Lower extremity pain, diffuse, unspecified laterality        Radicular leg pain          Care Instructions    Get Dr. Arvizu's shoe inserts for added support while walking.  Wear compression stockings when you need to stand for long periods of time. Take prednisone for radicular leg pain.  Norco at night if needed for significant pain- take sparingly, can be habit forming. I will let you know if your labs are abnormal. Follow up as needed for peristant or worsening symptoms.               Follow-ups after your visit        Follow-up notes from your care team     Return if symptoms worsen or fail to improve.      Your next 10 appointments already scheduled     May 16, 2018  1:15 PM CDT   MA SCREENING DIGITAL BILATERAL with [id number]   [facility]    [address] [place] [id number] [phone]           Do not use any powder, lotion or deodorant under your arms or on your breast. If you do, we will ask you to remove it before your exam.  Wear comfortable, two-piece clothing.  If you have any allergies, tell your care team.  Bring any previous mammograms from other facilities or have them mailed to the breast center.              Future tests that were ordered for you today     Open Future Orders        Priority Expected Expires Ordered    *MA Screening Digital Bilateral Routine  5/14/2019 5/14/2018            Who to contact     Normal or non-critical lab and imaging results will be communicated to you by MyChart, letter or phone within 4 business days after the clinic has  "received the results. If you do not hear from us within 7 days, please contact the clinic through [employer] or phone. If you have a critical or abnormal lab result, we will notify you by phone as soon as possible.  Submit refill requests through [employer] or call your pharmacy and they will forward the refill request to us. Please allow 3 business days for your refill to be completed.          If you need to speak with a  for additional information , please call: [phone]             Additional Information About Your Visit        Care EveryWhere ID     This is your Care EveryWhere ID. This could be used by other organizations to access your [facility] medical records  [id number]        Your Vitals Were     Pulse Temperature Respirations Height Pulse Oximetry BMI (Body Mass Index)    79 98.1  F (36.7  C) (Oral) 16 5' 1.22\" (1.555 m) 99% 27.99 kg/m2       Blood Pressure from Last 3 Encounters:   05/14/18 124/80   03/14/18 119/80   10/06/17 128/87    Weight from Last 3 Encounters:   05/14/18 149 lb 3.2 oz (67.7 kg)   03/14/18 147 lb (66.7 kg)   10/06/17 150 lb 8 oz (68.3 kg)              We Performed the Following     Basic metabolic panel  (Ca, Cl, CO2, Creat, Gluc, K, Na, BUN)     Lipid panel reflex to direct LDL Fasting     Vitamin D Deficiency          Today's Medication Changes          These changes are accurate as of 5/14/18  8:38 AM.  If you have any questions, ask your nurse or doctor.               Start taking these medicines.        Dose/Directions    HYDROcodone-acetaminophen 5-325 MG per tablet   Commonly known as:  NORCO   Used for:  Lower extremity pain, diffuse, unspecified laterality   Started by:  Audra Gramajo NP        Dose:  1 tablet   Take 1 tablet by mouth nightly as needed for moderate to severe pain   Quantity:  18 tablet   Refills:  0       predniSONE 20 MG tablet   Commonly known as:  DELTASONE   Used for:  Radicular leg pain   Started by:  Audra Gramajo NP        " Dose:  40 mg   Take 2 tablets (40 mg) by mouth daily for 5 days   Quantity:  10 tablet   Refills:  0            Where to get your medicines      These medications were sent to [id number] - [place] - [address]  [address] [address] [address]     Phone:  [phone]     predniSONE 20 MG tablet         Some of these will need a paper prescription and others can be bought over the counter.  Ask your nurse if you have questions.     Bring a paper prescription for each of these medications     HYDROcodone-acetaminophen 5-325 MG per tablet               Information about OPIOIDS     PRESCRIPTION OPIOIDS: WHAT YOU NEED TO KNOW   You have a prescription for an opioid (narcotic) pain medicine. Opioids can cause addiction. If you have a history of chemical dependency of any type, you are at a higher risk of becoming addicted to opioids. Only take this medicine after all other options have been tried. Take it for as short a time and as few doses as possible.     Do not:    Drive. If you drive while taking these medicines, you could be arrested for driving under the influence (DUI).    Operate heavy machinery    Do any other dangerous activities while taking these medicines.     Drink any alcohol while taking these medicines.      Take with any other medicines that contain acetaminophen. Read all labels carefully. Look for the word  acetaminophen  or  Tylenol.  Ask your pharmacist if you have questions or are unsure.    Store your pills in a secure place, locked if possible. We will not replace any lost or stolen medicine. If you don t finish your medicine, please throw away (dispose) as directed by your pharmacist. The [place] Pollution [employer] has more information about safe disposal: https://www.pca.state.[place]/living-green/managing-unwanted-medications    All opioids tend to cause constipation. Drink plenty of water and eat foods that have a lot of  fiber, such as fruits, vegetables, prune juice, apple juice and high-fiber cereal. Take a laxative (Miralax, milk of magnesia, Colace, Senna) if you don t move your bowels at least every other day.          Primary Care Provider Office Phone # Fax #    [facility] [phone] [address] [facility] [address]        Equal Access to Services     MIRNA LOFTON : Hadii aad ku hadasho Soomaali, waaxda luqadaha, qaybta kaalmada adeegyada, waxay idiin hayaan naomy moyaramomorales laashutosh . So [facility] [phone].    ATENCIÓN: Si habla español, tiene a garland disposición servicios gratuitos de asistencia lingüística. Loraame al [phone].    We comply with applicable federal civil rights laws and [place] laws. We do not discriminate on the basis of race, color, national origin, age, disability, sex, sexual orientation, or gender identity.            Thank you!     Thank you for choosing [facility]  for your care. Our goal is always to provide you with excellent care. Hearing back from our patients is one way we can continue to improve our services. Please take a few minutes to complete the written survey that you may receive in the mail after your visit with us. Thank you!             Your Updated Medication List - Protect others around you: Learn how to safely use, store and throw away your medicines at www.disposemymeds.org.          This list is accurate as of 5/14/18  8:38 AM.  Always use your most recent med list.                   Brand Name Dispense Instructions for use Diagnosis    amLODIPine 10 MG tablet    NORVASC    90 tablet    Take 1 tablet (10 mg) by mouth daily    Hypertension, goal below 140/90       aspirin 81 MG tablet      Take by mouth daily        HYDROcodone-acetaminophen 5-325 MG per tablet    NORCO    18 tablet    Take 1 tablet by mouth nightly as needed for moderate to severe pain    Lower extremity pain, diffuse, unspecified laterality       predniSONE 20 MG  tablet    DELTASONE    10 tablet    Take 2 tablets (40 mg) by mouth daily for 5 days    Radicular leg pain       simvastatin 20 MG tablet    ZOCOR    90 tablet    Take 1 tablet (20 mg) by mouth At Bedtime    Hyperlipidemia LDL goal <130

## 2018-05-14 NOTE — PATIENT INSTRUCTIONS
Get Dr. Arvizu's shoe inserts for added support while walking.  Wear compression stockings when you need to stand for long periods of time. Take prednisone for radicular leg pain.  Norco at night if needed for significant pain- take sparingly, can be habit forming. I will let you know if your labs are abnormal. Follow up as needed for peristant or worsening symptoms.

## 2018-05-16 ENCOUNTER — RADIANT APPOINTMENT (OUTPATIENT)
Dept: MAMMOGRAPHY | Facility: CLINIC | Age: 53
End: 2018-05-16
Payer: COMMERCIAL

## 2018-05-16 DIAGNOSIS — Z12.31 VISIT FOR SCREENING MAMMOGRAM: ICD-10-CM

## 2018-05-16 LAB — DEPRECATED CALCIDIOL+CALCIFEROL SERPL-MC: 16 UG/L (ref 20–75)

## 2018-05-16 PROCEDURE — 77067 SCR MAMMO BI INCL CAD: CPT | Mod: TC

## 2018-05-17 NOTE — PROGRESS NOTES
Please call pt: vitamin D level is low as we suspected, supplement sent in.  Additionally, a few vitamin levels are slightly low, consider taking a daily multivitamin (with food) daily. Follow up as needed.     SHENA Barrientos

## 2018-06-13 DIAGNOSIS — I10 HYPERTENSION, GOAL BELOW 140/90: ICD-10-CM

## 2018-06-13 NOTE — TELEPHONE ENCOUNTER
"Requested Prescriptions   Pending Prescriptions Disp Refills     amLODIPine (NORVASC) 10 MG tablet [Pharmacy Med Name: AMLODIPINE BESYLATE 10MG TABLETS] 90 tablet 0    Last Written Prescription Date:  5/15/18  Last Fill Quantity: 90,  # refills: 0   Last office visit: 5/14/2018 with prescribing provider:  5/14/18 Rox   Future Office Visit:   Sig: TAKE 1 TABLET BY MOUTH EVERY DAY    Calcium Channel Blockers Protocol  Failed    6/13/2018  4:09 PM       Failed - Normal serum creatinine on file in past 12 months    Recent Labs   Lab Test  05/14/18   0839   CR  0.50*            Passed - Blood pressure under 140/90 in past 12 months    BP Readings from Last 3 Encounters:   05/14/18 124/80   03/14/18 119/80   10/06/17 128/87                Passed - Recent (12 mo) or future (30 days) visit within the authorizing provider's specialty    Patient had office visit in the last 12 months or has a visit in the next 30 days with authorizing provider or within the authorizing provider's specialty.  See \"Patient Info\" tab in inbasket, or \"Choose Columns\" in Meds & Orders section of the refill encounter.           Passed - Patient is age 18 or older       Passed - No active pregnancy on record       Passed - No positive pregnancy test in past 12 months        "

## 2018-06-14 RX ORDER — AMLODIPINE BESYLATE 10 MG/1
TABLET ORAL
Qty: 90 TABLET | Refills: 0 | OUTPATIENT
Start: 2018-06-14

## 2018-10-08 DIAGNOSIS — E78.5 HYPERLIPIDEMIA LDL GOAL <130: ICD-10-CM

## 2018-10-08 NOTE — TELEPHONE ENCOUNTER
Requested Prescriptions   Pending Prescriptions Disp Refills     simvastatin (ZOCOR) 20 MG tablet  Last Written Prescription Date:  09/07/18  Last Fill Quantity: 90,  # refills: 0   Last office visit: 3/14/2018 with prescribing provider:  OSVALDO Mckeon   Future Office Visit:     90 tablet 1     Sig: Take 1 tablet (20 mg) by mouth At Bedtime    There is no refill protocol information for this order

## 2018-10-09 RX ORDER — SIMVASTATIN 20 MG
20 TABLET ORAL AT BEDTIME
Qty: 90 TABLET | Refills: 1 | Status: SHIPPED | OUTPATIENT
Start: 2018-10-09 | End: 2019-03-15

## 2018-10-17 ENCOUNTER — OFFICE VISIT (OUTPATIENT)
Dept: FAMILY MEDICINE | Facility: CLINIC | Age: 53
End: 2018-10-17
Payer: COMMERCIAL

## 2018-10-17 VITALS
HEART RATE: 85 BPM | OXYGEN SATURATION: 98 % | HEIGHT: 61 IN | WEIGHT: 150 LBS | TEMPERATURE: 97 F | SYSTOLIC BLOOD PRESSURE: 134 MMHG | DIASTOLIC BLOOD PRESSURE: 83 MMHG | BODY MASS INDEX: 28.32 KG/M2

## 2018-10-17 DIAGNOSIS — G44.219 EPISODIC TENSION-TYPE HEADACHE, NOT INTRACTABLE: ICD-10-CM

## 2018-10-17 DIAGNOSIS — Z23 NEED FOR PROPHYLACTIC VACCINATION AND INOCULATION AGAINST INFLUENZA: ICD-10-CM

## 2018-10-17 DIAGNOSIS — G47.00 INSOMNIA, UNSPECIFIED TYPE: ICD-10-CM

## 2018-10-17 DIAGNOSIS — M25.562 LEFT KNEE PAIN, UNSPECIFIED CHRONICITY: Primary | ICD-10-CM

## 2018-10-17 PROCEDURE — 90682 RIV4 VACC RECOMBINANT DNA IM: CPT | Performed by: FAMILY MEDICINE

## 2018-10-17 PROCEDURE — 99214 OFFICE O/P EST MOD 30 MIN: CPT | Mod: 25 | Performed by: FAMILY MEDICINE

## 2018-10-17 PROCEDURE — 90471 IMMUNIZATION ADMIN: CPT | Performed by: FAMILY MEDICINE

## 2018-10-17 RX ORDER — DICLOFENAC SODIUM 75 MG/1
75 TABLET, DELAYED RELEASE ORAL 2 TIMES DAILY PRN
Qty: 30 TABLET | Refills: 0 | Status: SHIPPED | OUTPATIENT
Start: 2018-10-17 | End: 2022-08-22

## 2018-10-17 NOTE — MR AVS SNAPSHOT
After Visit Summary   10/17/2018    Mercedes Long    MRN: [id number]           Patient Information     Date Of Birth          1965        Visit Information        Provider Department      10/17/2018 10:30 AM Gilma Fabian MD; [employer] [facility]        Today's Diagnoses     Left knee pain, unspecified chronicity    -  1    Episodic tension-type headache, not intractable        Need for prophylactic vaccination and inoculation against influenza          Care Instructions      R.I.C.E.  R.I.C.E. stands for Rest, Ice, Compression, and Elevation. Doing these things helps limit pain and swelling after an injury. R.I.C.E. also helps injuries heal faster. Use R.I.C.E. for sprains, strains, and severe bruises or bumps. Follow the tips on this handout and begin R.I.C.E. as soon as possible after an injury.  ? Rest  Pain is your body s way of telling you to rest an injured area. Whether you have hurt an elbow, hand, foot, or knee, limiting its use will prevent further injury and help you heal.  ? Ice  Applying ice right after an injury helps prevent swelling and reduce pain. Don t place ice directly on your skin.    Wrap a cold pack or bag of ice in a thin cloth. Place it over the injured area.    Ice for 10 minutes every 3 hours. Don t ice for more than 20 minutes at a time.  ? Compression  Putting pressure (compression) on an injury helps prevent swelling and provides support.    Wrap the injured area firmly with an elastic bandage. If your hand or foot tingles, becomes discolored, or feels cold to the touch, the bandage may be too tight. Rewrap it more loosely.    If your bandage becomes too loose, rewrap it.    Do not wear an elastic bandage overnight.  ? Elevation  Keeping an injury elevated helps reduce swelling, pain, and throbbing. Elevation is most effective when the injury is kept elevated higher than the heart.     Call your healthcare provider if you notice  any of the following:    Fingers or toes feel numb, are cold to the touch, or change color    Skin looks shiny or tight    Pain, swelling, or bruising worsens and is not improved with elevation   Date Last Reviewed: 9/3/2015    1826-1288 [employer]. [address] [place] [address]. All rights reserved. This information is not intended as a substitute for professional medical care. Always follow your healthcare professional's instructions.        Managing Tension-type Headache Symptoms  A tension-type headache can develop slowly. Being aware of the symptoms helps you recognize a headache early. Then you can act to reduce pain and relieve tension. Methods for relieving your symptoms include self-care and medicine.    Tension-type symptoms  The earlier you recognize the symptoms of a tension-type headache, the easier it is to treat. Tension-type headaches may:    Begin with fatigue, tension, or pain in the neck and shoulders    Feel like a band around the head    Be concentrated in the temple, the back of the head, behind the eyes, or in the face    Come and go, or last for days, weeks, or even longer    Involve referred pain -- this means that the area that hurts may not be where the problem begins  Self-care during a tension-type headache  When you have a tension-type headache, there are things you can do to relax, loosen muscles, and reduce the pain:    Brush your scalp lightly with a soft hairbrush.    Give yourself a massage. Knead the muscles running from your shoulders up the back of your skull. Or ask a friend to gently massage your neck and shoulders.    Use an ice pack. Wrap a thin cloth around a cold pack, a cold can of soda, or a bag of frozen vegetables. Apply this directly to the place where you feel pain (such as your neck or temples).    Use moist heat to relax your muscles. Take a warm shower or bath. Or drape a warm, moist towel around your neck and shoulders.  Relieving pain  and tension  Over-the-counter or prescription medicine can help relieve pain. Another way to reduce your pain is to use relaxation techniques to loosen tight muscles.  Medicine  Medicine used for tension-type headaches include the following:    NSAIDs (nonsteroidal anti-inflammatories), such as aspirin and ibuprofen, relieve inflammation and help block pain signals.    Acetaminophen treats pain, and some formulations contain caffeine.     Muscle relaxants can reduce painful muscle contractions.  Taking medicine safely  Be aware that:    Taking analgesics (pain relievers) or drinking too much coffee may lead to rebound headaches (frequent or severe headaches that can happen if you miss a dose of medication), so take pain medications only as needed. If you think you have these headaches, contact your health care provider.    Taking too much medication can cause sleep problems or stomach upset. Some over-the-counter headache medications may contain caffeine. These may disrupt sleep and worsen pain.  Relaxation techniques  A , class, book, or tape may help you learn these techniques. One or more of these methods may work for you:    Deep breathing. Slow, calm, deep breathing can help you relax. Breathe in for a count of 5 or more. Then slowly let the breath out.    Visualization. Imagining a peaceful, secure scene can give you a sense of control over your body and surroundings.    Progressive relaxation. This is done by tightening and then releasing muscle groups. Start at the top of your head and work your way down your body. Tighten each muscle group for 5 to 10 seconds. Then release the muscle group for the same amount of time.    Biofeedback. This is a type of training in which you learn to control certain physical functions and responses. This helps you learn to reduce muscle tension.     Date Last Reviewed: 11/9/2015 2000-2017 [employer]. [address] [place] [address], [place] [address]. All  "rights reserved. This information is not intended as a substitute for professional medical care. Always follow your healthcare professional's instructions.                Follow-ups after your visit        Follow-up notes from your care team     Return in about 1 month (around 11/17/2018), or if symptoms worsen or fail to improve, for Follow up.      Who to contact     Normal or non-critical lab and imaging results will be communicated to you by MyChart, letter or phone within 4 business days after the clinic has received the results. If you do not hear from us within 7 days, please contact the clinic through MyChart or phone. If you have a critical or abnormal lab result, we will notify you by phone as soon as possible.  Submit refill requests through [employer] or call your pharmacy and they will forward the refill request to us. Please allow 3 business days for your refill to be completed.          If you need to speak with a  for additional information , please call: [phone]             Additional Information About Your Visit        Care EveryWhere ID     This is your Care EveryWhere ID. This could be used by other organizations to access your [facility] medical records  [id number]        Your Vitals Were     Pulse Temperature Height Pulse Oximetry Breastfeeding? BMI (Body Mass Index)    85 97  F (36.1  C) (Tympanic) 5' 1\" (1.549 m) 98% No 28.34 kg/m2       Blood Pressure from Last 3 Encounters:   10/17/18 134/83   05/14/18 124/80   03/14/18 119/80    Weight from Last 3 Encounters:   10/17/18 150 lb (68 kg)   05/14/18 149 lb 3.2 oz (67.7 kg)   03/14/18 147 lb (66.7 kg)              We Performed the Following     ADMIN 1st VACCINE     FLU VACCINE, (RIV4) RECOMBINANT HA  , IM (FluBlok, egg free) [18763]- >18 YRS (FMG recommended  50-64 YRS)          Today's Medication Changes          These changes are accurate as of 10/17/18 11:19 AM.  If you have any questions, ask your nurse or doctor.       "         Start taking these medicines.        Dose/Directions    diclofenac 75 MG EC tablet   Commonly known as:  VOLTAREN   Used for:  Left knee pain, unspecified chronicity   Started by:  Gilma Fabian MD        Dose:  75 mg   Take 1 tablet (75 mg) by mouth 2 times daily as needed for moderate pain X 7 days (take with food)   Quantity:  30 tablet   Refills:  0            Where to get your medicines      These medications were sent to [facility] [address]  [place], [place] - [address] [facility] [address] [facility] [address]  [address] [facility], [place] [address]     Phone:  [phone]     diclofenac 75 MG EC tablet                Primary Care Provider Office Phone # Fax #    Laura Bentley PA-C [phone] [address] [facility]  [place] [address]        Equal Access to Services     [facility]: Hadii aad ku hadasho Soomaali, waaxda luqadaha, qaybta kaalmada adeegyada, waxay idiin hayaan adejeremy moctezuma . So [facility] [phone].    ATENCIÓN: Si habla español, tiene a garland disposición servicios gratuitos de asistencia lingüística. Llame al [phone].    We comply with applicable federal civil rights laws and [place] laws. We do not discriminate on the basis of race, color, national origin, age, disability, sex, sexual orientation, or gender identity.            Thank you!     Thank you for choosing [facility]  for your care. Our goal is always to provide you with excellent care. Hearing back from our patients is one way we can continue to improve our services. Please take a few minutes to complete the written survey that you may receive in the mail after your visit with us. Thank you!             Your Updated Medication List - Protect others around you: Learn how to safely use, store and throw away your medicines at www.disposemymeds.org.          This list is accurate as of 10/17/18 11:19 AM.  Always use your most recent med list.                   Brand Name  Dispense Instructions for use Diagnosis    amLODIPine 10 MG tablet    NORVASC    90 tablet    Take 1 tablet (10 mg) by mouth daily    Hypertension, goal below 140/90       aspirin 81 MG tablet      Take by mouth daily        diclofenac 75 MG EC tablet    VOLTAREN    30 tablet    Take 1 tablet (75 mg) by mouth 2 times daily as needed for moderate pain X 7 days (take with food)    Left knee pain, unspecified chronicity       HYDROcodone-acetaminophen 5-325 MG per tablet    NORCO    18 tablet    Take 1 tablet by mouth nightly as needed for moderate to severe pain    Lower extremity pain, diffuse, unspecified laterality       simvastatin 20 MG tablet    ZOCOR    90 tablet    Take 1 tablet (20 mg) by mouth At Bedtime    Hyperlipidemia LDL goal <130

## 2018-10-17 NOTE — PATIENT INSTRUCTIONS
R.I.C.E.  R.I.C.E. stands for Rest, Ice, Compression, and Elevation. Doing these things helps limit pain and swelling after an injury. R.I.C.E. also helps injuries heal faster. Use R.I.C.E. for sprains, strains, and severe bruises or bumps. Follow the tips on this handout and begin R.I.C.E. as soon as possible after an injury.  ? Rest  Pain is your body s way of telling you to rest an injured area. Whether you have hurt an elbow, hand, foot, or knee, limiting its use will prevent further injury and help you heal.  ? Ice  Applying ice right after an injury helps prevent swelling and reduce pain. Don t place ice directly on your skin.    Wrap a cold pack or bag of ice in a thin cloth. Place it over the injured area.    Ice for 10 minutes every 3 hours. Don t ice for more than 20 minutes at a time.  ? Compression  Putting pressure (compression) on an injury helps prevent swelling and provides support.    Wrap the injured area firmly with an elastic bandage. If your hand or foot tingles, becomes discolored, or feels cold to the touch, the bandage may be too tight. Rewrap it more loosely.    If your bandage becomes too loose, rewrap it.    Do not wear an elastic bandage overnight.  ? Elevation  Keeping an injury elevated helps reduce swelling, pain, and throbbing. Elevation is most effective when the injury is kept elevated higher than the heart.     Call your healthcare provider if you notice any of the following:    Fingers or toes feel numb, are cold to the touch, or change color    Skin looks shiny or tight    Pain, swelling, or bruising worsens and is not improved with elevation   Date Last Reviewed: 9/3/2015    3410-4188 [employer]. [address] [place] [address], [place] [address]. All rights reserved. This information is not intended as a substitute for professional medical care. Always follow your healthcare professional's instructions.        Managing Tension-type Headache Symptoms  A tension-type  headache can develop slowly. Being aware of the symptoms helps you recognize a headache early. Then you can act to reduce pain and relieve tension. Methods for relieving your symptoms include self-care and medicine.    Tension-type symptoms  The earlier you recognize the symptoms of a tension-type headache, the easier it is to treat. Tension-type headaches may:    Begin with fatigue, tension, or pain in the neck and shoulders    Feel like a band around the head    Be concentrated in the temple, the back of the head, behind the eyes, or in the face    Come and go, or last for days, weeks, or even longer    Involve referred pain -- this means that the area that hurts may not be where the problem begins  Self-care during a tension-type headache  When you have a tension-type headache, there are things you can do to relax, loosen muscles, and reduce the pain:    Brush your scalp lightly with a soft hairbrush.    Give yourself a massage. Knead the muscles running from your shoulders up the back of your skull. Or ask a friend to gently massage your neck and shoulders.    Use an ice pack. Wrap a thin cloth around a cold pack, a cold can of soda, or a bag of frozen vegetables. Apply this directly to the place where you feel pain (such as your neck or temples).    Use moist heat to relax your muscles. Take a warm shower or bath. Or drape a warm, moist towel around your neck and shoulders.  Relieving pain and tension  Over-the-counter or prescription medicine can help relieve pain. Another way to reduce your pain is to use relaxation techniques to loosen tight muscles.  Medicine  Medicine used for tension-type headaches include the following:    NSAIDs (nonsteroidal anti-inflammatories), such as aspirin and ibuprofen, relieve inflammation and help block pain signals.    Acetaminophen treats pain, and some formulations contain caffeine.     Muscle relaxants can reduce painful muscle contractions.  Taking medicine safely  Be  aware that:    Taking analgesics (pain relievers) or drinking too much coffee may lead to rebound headaches (frequent or severe headaches that can happen if you miss a dose of medication), so take pain medications only as needed. If you think you have these headaches, contact your health care provider.    Taking too much medication can cause sleep problems or stomach upset. Some over-the-counter headache medications may contain caffeine. These may disrupt sleep and worsen pain.  Relaxation techniques  A , class, book, or tape may help you learn these techniques. One or more of these methods may work for you:    Deep breathing. Slow, calm, deep breathing can help you relax. Breathe in for a count of 5 or more. Then slowly let the breath out.    Visualization. Imagining a peaceful, secure scene can give you a sense of control over your body and surroundings.    Progressive relaxation. This is done by tightening and then releasing muscle groups. Start at the top of your head and work your way down your body. Tighten each muscle group for 5 to 10 seconds. Then release the muscle group for the same amount of time.    Biofeedback. This is a type of training in which you learn to control certain physical functions and responses. This helps you learn to reduce muscle tension.     Date Last Reviewed: 11/9/2015 2000-2017 [employer]. [address] [place] [address], [place] [address]. All rights reserved. This information is not intended as a substitute for professional medical care. Always follow your healthcare professional's instructions.

## 2018-10-17 NOTE — PROGRESS NOTES
SUBJECTIVE:   Mercedes Long is a 53 year old female who presents to clinic today for the following health issues:     present.     Joint Pain    Onset: x2 months    Description:   Location: left knee  Character: Sharp    Intensity: moderate    Progression of Symptoms: worse    Accompanying Signs & Symptoms:  Other symptoms: none    History:   Previous similar pain: YES- seen 9/21/2017- Knee X ray showed no evidence of acute fracture or malalignment with small effusion. States that the Diclofenac was effective for pain relief. Requesting a refill.     Precipitating factors:   Trauma or overuse: no     Alleviating factors:  Improved by: nothing    Therapies Tried and outcome: none      Headache x 2 weeks   Sharp pains in top left side of head.   Advil relieves pain but comes back when medicine wears off.    Hx of headaches.   Admits that she has been under stress lately with a mentally ill family member and this has also been causing insomnia.  Denies feeling anxious /depressed or a prior history    Had a Brain MRI in 6/2016- reported normal.       Problem list and histories reviewed & adjusted, as indicated.  Additional history: as documented    Patient Active Problem List   Diagnosis     Hypertension, goal below 140/90     Hyperlipidemia LDL goal <130     GERD (gastroesophageal reflux disease)     Seborrheic dermatitis     Prediabetes     Past Surgical History:   Procedure Laterality Date     APPENDECTOMY       CARPAL TUNNEL RELEASE RT/LT Right      COLONOSCOPY WITH CO2 INSUFFLATION N/A 5/18/2016    Procedure: COLONOSCOPY WITH CO2 INSUFFLATION;  Surgeon: Duane, William Charles, MD;  Location:  OR       Social History   Substance Use Topics     Smoking status: Never Smoker     Smokeless tobacco: Never Used     Alcohol use No     Family History   Problem Relation Age of Onset     Diabetes Mother      Hyperlipidemia Mother      Hypertension Mother      Hypertension Sister      Hypertension Brother       "Hyperlipidemia Brother      Cerebrovascular Disease Brother      Breast Cancer Maternal Aunt      in her 40s,      Colon Cancer No family hx of          Current Outpatient Prescriptions   Medication Sig Dispense Refill     diclofenac (VOLTAREN) 75 MG EC tablet Take 1 tablet (75 mg) by mouth 2 times daily as needed for moderate pain X 7 days (take with food) 30 tablet 0     melatonin 5 MG tablet Take 1 tablet (5 mg) by mouth nightly as needed for sleep       amLODIPine (NORVASC) 10 MG tablet Take 1 tablet (10 mg) by mouth daily 90 tablet 3     aspirin 81 MG tablet Take by mouth daily       HYDROcodone-acetaminophen (NORCO) 5-325 MG per tablet Take 1 tablet by mouth nightly as needed for moderate to severe pain 18 tablet 0     simvastatin (ZOCOR) 20 MG tablet Take 1 tablet (20 mg) by mouth At Bedtime 90 tablet 1     No Known Allergies    Reviewed and updated as needed this visit by clinical staff  Tobacco       Reviewed and updated as needed this visit by Provider         ROS:  All others are negative except as above.      OBJECTIVE:     /83  Pulse 85  Temp 97  F (36.1  C) (Tympanic)  Ht 5' 1\" (1.549 m)  Wt 150 lb (68 kg)  SpO2 98%  Breastfeeding? No  BMI 28.34 kg/m2  Body mass index is 28.34 kg/(m^2).  GENERAL: healthy, alert and no distress  NECK: no adenopathy, no asymmetry, masses, or scars and thyroid normal to palpation. Neck and bilateral shoulder tenderness.  MS: Left Knee exam shows normal strength and muscle mass,no gross abnormalities or deformities/effusion. FROM but with discomfort on flexion. Anterior knee tenderness, no overlying skin changes.  NEURO: Normal strength and tone, sensory exam grossly normal, mentation intact and cranial nerves 2-12 intact    Diagnostic Test Results:  Previous Brain MRI reviewed  MRI BRAIN WITHOUT AND WITH CONTRAST  6/15/2016 8:53 AM     HISTORY:  Migraine and dizziness.     TECHNIQUE:  Multiplanar, multisequence MRI of the brain without and  with 6.5 " mL Gadavist.     COMPARISON: None.     FINDINGS:  The brain parenchyma, ventricles and subarachnoid spaces  appear normal.  There is no evidence of hemorrhage, mass, acute  infarct, or anomaly.  There are no gadolinium enhancing lesions.       The facial structures appear normal. The arteries at the base of the  brain and the dural venous sinuses appear patent.          IMPRESSION:  Normal MRI of the brain.      MILLIE YEE MD    ASSESSMENT/PLAN:   Mercedes was seen today for headache and knee pain.    Diagnoses and all orders for this visit:    Left knee pain, unspecified chronicity  -     diclofenac (VOLTAREN) 75 MG EC tablet; Take 1 tablet (75 mg) by mouth 2 times daily as needed for moderate pain X 7 days (take with food)  -   In addition, recommended R.I.C.E therapy- may use an ACE wrap.   -   If pain fails to improve, will consider a knee MRI to further evaluate.     Episodic tension-type headache, not intractable  Discussed ways to reduce pain and tension    Apply a cold compress or ice pack to the pain site.    Drink fluids. If nausea makes it hard to drink, try sucking on ice.    Rest. Protect yourself from bright light and loud noises.    Calm your emotions by imagining a peaceful scene.    Massage tight neck, shoulder, and head muscles.    To relax muscles, soak in a hot bath or use a hot shower  Take an NSAID as needed  Patient education and Handout given      Insomnia, unspecified type  -     Trial: melatonin 5 MG tablet; Take 1 tablet (5 mg) by mouth nightly as needed for sleep  -     Discussed Sleep Hygiene habits that tend to improve and maintain good sleep  -Sleep only long enough to feel rested and then get out of bed  -Go to bed and get up at the same time every day  -Do not try to force yourself to sleep. If you can't sleep, get out of bed and try again later.  -Have coffee, tea, and other foods that have caffeine only in the morning  -Avoid alcohol in the late afternoon, evening, and  "bedtime  -Avoid smoking, especially in the evening  -Keep your bedroom dark, cool, quiet, and free of reminders of work or other things that cause you stress  -Solve problems you have before you go to bed  -Exercise several days a week, but not right before bed  - Avoid looking at phones or reading devices (\"e-books\") that give off light before bed. This can make it harder to fall asleep      Need for prophylactic vaccination and inoculation against influenza  -     FLU VACCINE, (RIV4) RECOMBINANT HA  , IM (FluBlok, egg free) [24626]- >18 YRS (FMG recommended  50-64 YRS)  -     ADMIN 1st VACCINE      Follow up in a month, sooner if needed.     Gilma Fabian MD  [facility]    Injectable Influenza Immunization Documentation    1.  Is the person to be vaccinated sick today?   No    2. Does the person to be vaccinated have an allergy to a component   of the vaccine?   No  Egg Allergy Algorithm Link    3. Has the person to be vaccinated ever had a serious reaction   to influenza vaccine in the past?   No    4. Has the person to be vaccinated ever had Guillain-Barré syndrome?   No    Form completed by Naida Rose MA           "

## 2019-03-15 ENCOUNTER — OFFICE VISIT (OUTPATIENT)
Dept: FAMILY MEDICINE | Facility: CLINIC | Age: 54
End: 2019-03-15
Payer: COMMERCIAL

## 2019-03-15 VITALS
RESPIRATION RATE: 16 BRPM | SYSTOLIC BLOOD PRESSURE: 118 MMHG | HEART RATE: 83 BPM | WEIGHT: 151 LBS | DIASTOLIC BLOOD PRESSURE: 78 MMHG | OXYGEN SATURATION: 98 % | TEMPERATURE: 97.6 F | BODY MASS INDEX: 28.53 KG/M2

## 2019-03-15 DIAGNOSIS — I10 HYPERTENSION, GOAL BELOW 140/90: ICD-10-CM

## 2019-03-15 DIAGNOSIS — E78.5 HYPERLIPIDEMIA LDL GOAL <130: ICD-10-CM

## 2019-03-15 DIAGNOSIS — G47.00 INSOMNIA, UNSPECIFIED TYPE: Primary | ICD-10-CM

## 2019-03-15 PROCEDURE — 99214 OFFICE O/P EST MOD 30 MIN: CPT | Performed by: PHYSICIAN ASSISTANT

## 2019-03-15 RX ORDER — SIMVASTATIN 20 MG
20 TABLET ORAL AT BEDTIME
Qty: 90 TABLET | Refills: 0 | Status: SHIPPED | OUTPATIENT
Start: 2019-03-15 | End: 2019-05-13

## 2019-03-15 RX ORDER — AMLODIPINE BESYLATE 10 MG/1
10 TABLET ORAL DAILY
Qty: 90 TABLET | Refills: 0 | Status: SHIPPED | OUTPATIENT
Start: 2019-03-15 | End: 2019-05-13

## 2019-03-15 NOTE — PATIENT INSTRUCTIONS
Bring your cuff from home to the blood pressure check in 2 weeks.    Follow up with me in 2 months for a physical and fasting labs.

## 2019-03-15 NOTE — PROGRESS NOTES
SUBJECTIVE:   Mercedes Long is a 53 year old female who presents to clinic today for the following health issues:      Hypertension Follow-up      Outpatient blood pressures are being checked at home.  Results are 150/90.    Low Salt Diet: no added salt      Amount of exercise or physical activity: None    Problems taking medications regularly: No    Medication side effects: none    Diet: regular (no restrictions)      ED/UC Followup:    Facility:  [facility]  Date of visit: 03/14/2019  Reason for visit: Headache, fatigue, chest tightness  Current Status: is under a lot of stress, worries a lot       Abnormal Mood Symptoms      Duration: x 2 months    Description:  Depression: YES  Anxiety: YES  Panic attacks: YES     Accompanying signs and symptoms: see PHQ-9 and SOUTH scores    History (similar episodes/previous evaluation): 10/27/2016 was given Amitriptyline 25mg    Precipitating or alleviating factors: None    Therapies tried and outcome: none    Would like to restart amitriptyline  This has helped her sleep in the past which also helped her relax        Problem list and histories reviewed & adjusted, as indicated.  Additional history: as documented    Patient Active Problem List   Diagnosis     Hypertension, goal below 140/90     Hyperlipidemia LDL goal <130     GERD (gastroesophageal reflux disease)     Seborrheic dermatitis     Prediabetes     Past Surgical History:   Procedure Laterality Date     APPENDECTOMY       CARPAL TUNNEL RELEASE RT/LT Right      COLONOSCOPY WITH CO2 INSUFFLATION N/A 5/18/2016    Procedure: COLONOSCOPY WITH CO2 INSUFFLATION;  Surgeon: Duane, William Charles, MD;  Location:  OR       Social History     Tobacco Use     Smoking status: Never Smoker     Smokeless tobacco: Never Used   Substance Use Topics     Alcohol use: No     Alcohol/week: 0.0 oz     Family History   Problem Relation Age of Onset     Diabetes Mother      Hyperlipidemia Mother      Hypertension Mother       Hypertension Sister      Hypertension Brother      Hyperlipidemia Brother      Cerebrovascular Disease Brother      Breast Cancer Maternal Aunt         in her 40s,      Colon Cancer No family hx of            Reviewed and updated as needed this visit by clinical staff       Reviewed and updated as needed this visit by Provider         ROS:  Constitutional, cardiac, and psychiatric systems are negative, except as otherwise noted.    OBJECTIVE:                                                    /78   Pulse 83   Temp 97.6  F (36.4  C) (Tympanic)   Resp 16   Wt 68.5 kg (151 lb)   SpO2 98%   BMI 28.53 kg/m    Body mass index is 28.53 kg/m .  Constitutional: healthy, alert, active, no distress.    Head: Normocephalic   Musculoskeletal: extremities normal- no gross deformities noted, gait normal, normal muscle tone and able to move about the exam room without difficulty.    Skin: no suspicious lesions or rashes appreciated on exposed areas  Neurologic: Gait normal. Moving all extremities spontaneously, no apparent weakness.    Psychiatric: mentation appears normal, thoughts are clear and concise. Converses appropriately. Affect is Appropriate/mood-congruent       ASSESSMENT:                                                      1. Insomnia, unspecified type    2. Hypertension, goal below 140/90    3. Hyperlipidemia LDL goal <130         PLAN:                                                    1) Care Everywhere reviewed. Patient will restart amitriptyline 25mg daily - she feels as though this will be helpful. Follow up if symptoms fail to improve or worsen.     2,3) Meds renewed, no changes. Her blood pressure is at goal today. Will have her bring in her cuff from home in the next couple weeks.    Patient Instructions   Bring your cuff from home to the blood pressure check in 2 weeks.    Follow up with me in 2 months for a physical and fasting labs.       The patient was in agreement with the plan today  and had no questions or concerns prior to leaving the clinic.     Laura Bentley PA-C  [facility]

## 2019-04-01 ENCOUNTER — ALLIED HEALTH/NURSE VISIT (OUTPATIENT)
Dept: NURSING | Facility: CLINIC | Age: 54
End: 2019-04-01
Payer: COMMERCIAL

## 2019-04-01 VITALS
HEART RATE: 88 BPM | DIASTOLIC BLOOD PRESSURE: 85 MMHG | BODY MASS INDEX: 28.51 KG/M2 | RESPIRATION RATE: 16 BRPM | OXYGEN SATURATION: 96 % | HEIGHT: 61 IN | SYSTOLIC BLOOD PRESSURE: 129 MMHG | WEIGHT: 151 LBS

## 2019-04-01 DIAGNOSIS — I10 HYPERTENSION, GOAL BELOW 140/90: Primary | ICD-10-CM

## 2019-04-01 PROCEDURE — 99207 ZZC NO CHARGE NURSE ONLY: CPT

## 2019-04-01 ASSESSMENT — MIFFLIN-ST. JEOR: SCORE: 1217.31

## 2019-04-01 NOTE — PROGRESS NOTES
"Mercedes Long is a 53 year old patient who comes in today for a Blood Pressure check.  Initial BP:  /85   Pulse 88   Resp 16   Ht 1.549 m (5' 1\")   Wt 68.5 kg (151 lb)   SpO2 96%   BMI 28.53 kg/m       88  Disposition: follow-up as previously indicated by provider  "

## 2019-05-13 ENCOUNTER — OFFICE VISIT (OUTPATIENT)
Dept: FAMILY MEDICINE | Facility: CLINIC | Age: 54
End: 2019-05-13
Payer: COMMERCIAL

## 2019-05-13 VITALS
TEMPERATURE: 97.5 F | DIASTOLIC BLOOD PRESSURE: 80 MMHG | BODY MASS INDEX: 29.02 KG/M2 | SYSTOLIC BLOOD PRESSURE: 125 MMHG | RESPIRATION RATE: 16 BRPM | HEART RATE: 94 BPM | WEIGHT: 153.6 LBS | OXYGEN SATURATION: 97 %

## 2019-05-13 DIAGNOSIS — Z00.01 ENCOUNTER FOR ROUTINE ADULT MEDICAL EXAM WITH ABNORMAL FINDINGS: Primary | ICD-10-CM

## 2019-05-13 DIAGNOSIS — G47.00 INSOMNIA, UNSPECIFIED TYPE: ICD-10-CM

## 2019-05-13 DIAGNOSIS — R73.03 PREDIABETES: ICD-10-CM

## 2019-05-13 DIAGNOSIS — E78.5 HYPERLIPIDEMIA LDL GOAL <130: ICD-10-CM

## 2019-05-13 DIAGNOSIS — I10 HYPERTENSION, GOAL BELOW 140/90: ICD-10-CM

## 2019-05-13 DIAGNOSIS — K21.9 GASTROESOPHAGEAL REFLUX DISEASE WITHOUT ESOPHAGITIS: ICD-10-CM

## 2019-05-13 DIAGNOSIS — Z11.4 SCREENING FOR HIV (HUMAN IMMUNODEFICIENCY VIRUS): ICD-10-CM

## 2019-05-13 LAB
ANION GAP SERPL CALCULATED.3IONS-SCNC: 8 MMOL/L (ref 3–14)
BUN SERPL-MCNC: 12 MG/DL (ref 7–30)
CALCIUM SERPL-MCNC: 8.6 MG/DL (ref 8.5–10.1)
CHLORIDE SERPL-SCNC: 105 MMOL/L (ref 94–109)
CHOLEST SERPL-MCNC: 186 MG/DL
CO2 SERPL-SCNC: 26 MMOL/L (ref 20–32)
CREAT SERPL-MCNC: 0.52 MG/DL (ref 0.52–1.04)
GFR SERPL CREATININE-BSD FRML MDRD: >90 ML/MIN/{1.73_M2}
GLUCOSE SERPL-MCNC: 138 MG/DL (ref 70–99)
HBA1C MFR BLD: 6.9 % (ref 0–5.6)
HDLC SERPL-MCNC: 75 MG/DL
HIV 1+2 AB+HIV1 P24 AG SERPL QL IA: NONREACTIVE
LDLC SERPL CALC-MCNC: 82 MG/DL
NONHDLC SERPL-MCNC: 111 MG/DL
POTASSIUM SERPL-SCNC: 3.7 MMOL/L (ref 3.4–5.3)
SODIUM SERPL-SCNC: 139 MMOL/L (ref 133–144)
TRIGL SERPL-MCNC: 146 MG/DL

## 2019-05-13 PROCEDURE — 99396 PREV VISIT EST AGE 40-64: CPT | Performed by: PHYSICIAN ASSISTANT

## 2019-05-13 PROCEDURE — 36415 COLL VENOUS BLD VENIPUNCTURE: CPT | Performed by: PHYSICIAN ASSISTANT

## 2019-05-13 PROCEDURE — 87389 HIV-1 AG W/HIV-1&-2 AB AG IA: CPT | Performed by: PHYSICIAN ASSISTANT

## 2019-05-13 PROCEDURE — 83036 HEMOGLOBIN GLYCOSYLATED A1C: CPT | Performed by: PHYSICIAN ASSISTANT

## 2019-05-13 PROCEDURE — 80061 LIPID PANEL: CPT | Performed by: PHYSICIAN ASSISTANT

## 2019-05-13 PROCEDURE — G0476 HPV COMBO ASSAY CA SCREEN: HCPCS | Performed by: PHYSICIAN ASSISTANT

## 2019-05-13 PROCEDURE — 99214 OFFICE O/P EST MOD 30 MIN: CPT | Mod: 25 | Performed by: PHYSICIAN ASSISTANT

## 2019-05-13 PROCEDURE — G0123 SCREEN CERV/VAG THIN LAYER: HCPCS | Performed by: PHYSICIAN ASSISTANT

## 2019-05-13 PROCEDURE — 80048 BASIC METABOLIC PNL TOTAL CA: CPT | Performed by: PHYSICIAN ASSISTANT

## 2019-05-13 RX ORDER — SIMVASTATIN 20 MG
20 TABLET ORAL AT BEDTIME
Qty: 90 TABLET | Refills: 3 | Status: SHIPPED | OUTPATIENT
Start: 2019-05-13 | End: 2020-05-19

## 2019-05-13 RX ORDER — AMLODIPINE BESYLATE 10 MG/1
10 TABLET ORAL DAILY
Qty: 90 TABLET | Refills: 3 | Status: SHIPPED | OUTPATIENT
Start: 2019-05-13 | End: 2020-05-19

## 2019-05-13 NOTE — PROGRESS NOTES
SUBJECTIVE:   CC: Mercedes Long is an 53 year old woman who presents for preventive health visit.     Healthy Habits:    Do you get at least three servings of calcium containing foods daily (dairy, green leafy vegetables, etc.)? yes    Amount of exercise or daily activities, outside of work: No    Problems taking medications regularly No    Medication side effects: No    Have you had an eye exam in the past two years? yes    Do you see a dentist twice per year? No, ANNUALLY     Do you have sleep apnea, excessive snoring or daytime drowsiness?yes daytime drowsiness, SNORES SOMETIMES       PROBLEMS TO ADD ON...  Patient informed that anything we discuss that is not related to preventative medicine, may be billed for; patient verbalizes understanding.    Fasting     Heartburn   would     Needing refills and/or labs for:  Hypertension  Is blood pressure being checked at home? No  Medication side effects? No    Hyperlipidemia  Any dietary restrictions? No If so, what type of restriction?   Medication side effects? No      -------------------------------------    Today's PHQ-2 Score:   PHQ-2 ( 1999 Pf[employer]) 5/13/2019 5/23/2017   Q1: Little interest or pleasure in doing things 1 0   Q2: Feeling down, depressed or hopeless 0 0   PHQ-2 Score 1 0       Abuse: Current or Past(Physical, Sexual or Emotional)- No  Do you feel safe in your environment? Yes    Social History     Tobacco Use     Smoking status: Never Smoker     Smokeless tobacco: Never Used   Substance Use Topics     Alcohol use: No     Alcohol/week: 0.0 oz     If you drink alcohol do you typically have >3 drinks per day or >7 drinks per week? No                     Reviewed orders with patient.  Reviewed health maintenance and updated orders accordingly - Yes  Labs reviewed in EPIC    Mammogram Screening: Patient over age 50, mutual decision to screen reflected in health maintenance.    Pertinent mammograms are reviewed under the imaging tab.  History of  abnormal Pap smear: NO - age 30- 65 PAP every 3 years recommended  PAP / HPV 5/10/2016 1/20/2015   PAP NIL -   HPV-ABSTRA - Negative     Reviewed and updated as needed this visit by clinical staff  Tobacco  Allergies  Meds         Reviewed and updated as needed this visit by Provider        Past Medical History:   Diagnosis Date     GERD (gastroesophageal reflux disease)      Hypercholesteremia      Hypertension         ROS:  Other than what is noted in the HPI and PMH a complete review of systems is otherwise negative including: Constitutional, HEENT, endocrine, cardiovascular, respiratory, GI/, musculoskeletal, neuro, and psychiatric.     OBJECTIVE:   /80   Pulse 94   Temp 97.5  F (36.4  C) (Tympanic)   Resp 16   Wt 69.7 kg (153 lb 9.6 oz)   SpO2 97%   BMI 29.02 kg/m    EXAM:  Constitutional: healthy, alert and no distress   Cardiovascular: RRR. No murmurs, clicks gallops or rub  Respiratory: Lungs clear  Psychiatric: mentation appears normal and affect normal/bright  Head: Normocephalic. No masses, lesions, tenderness or abnormalities  Neck: Neck supple. No adenopathy. Thyroid symmetric, normal size,  ENT: throat normal without erythema or exudate, TMs clear  Abdomen: Abdomen soft, non-tender. No masses, organomegaly  : Normal external genitalia, vaginal vault, and cervix   SKIN: no suspicious lesions or rashes  LYMPH: Normal cervical lymph nodes  JOINT/EXTREMITIES: extremities normal- no gross deformities noted, gait normal and normal muscle tone     ASSESSMENT/PLAN:       ICD-10-CM    1. Encounter for routine adult medical exam with abnormal findings Z00.01 Pap imaged thin layer screen with HPV - recommended age 30 - 65 years (select HPV order below)     HPV High Risk Types DNA Cervical   2. Screening for HIV (human immunodeficiency virus) Z11.4 HIV Screening   3. Hypertension, goal below 140/90 I10 BASIC METABOLIC PANEL     amLODIPine (NORVASC) 10 MG tablet   4. Gastroesophageal reflux  "disease without esophagitis K21.9 omeprazole (PRILOSEC) 20 MG DR capsule   5. Hyperlipidemia LDL goal <130 E78.5 Lipid panel reflex to direct LDL Fasting     simvastatin (ZOCOR) 20 MG tablet   6. Prediabetes R73.03 BASIC METABOLIC PANEL     Hemoglobin A1c   7. Insomnia, unspecified type G47.00 amitriptyline (ELAVIL) 25 MG tablet       1,2) Screenings discussed. Follow up annually    3,5,6) Routine labs today. Meds renewed, no changes, conditions stable.     4) Will restart omeprazole 20mg every day.     7) Med renewed, no changes.        COUNSELING:   Reviewed preventive health counseling, as reflected in patient instructions    Estimated body mass index is 29.02 kg/m  as calculated from the following:    Height as of 4/1/19: 1.549 m (5' 1\").    Weight as of this encounter: 69.7 kg (153 lb 9.6 oz).       reports that she has never smoked. She has never used smokeless tobacco.  Counseling Resources:  ATP IV Guidelines  Pooled Cohorts Equation Calculator  Breast Cancer Risk Calculator  FRAX Risk Assessment  ICSI Preventive Guidelines  Dietary Guidelines for Americans, 2010  USDA's MyPlate  ASA Prophylaxis  Lung CA Screening    Laura Bentley PA-C  [facility] [place]  "

## 2019-05-13 NOTE — PATIENT INSTRUCTIONS
A CALCIUM/VITAMIN D SUPPLEMENT AND TAKE THIS DAILY   ALSO, START WALKING FOR 30 MINUTES PER DAY, 4 DAYS PER WEEK.    Preventive Health Recommendations  Female Ages 50 - 64    Yearly exam: See your health care provider every year in order to  o Review health changes.   o Discuss preventive care.    o Review your medicines if your doctor has prescribed any.      Get a Pap test every three years (unless you have an abnormal result and your provider advises testing more often).    If you get Pap tests with HPV test, you only need to test every 5 years, unless you have an abnormal result.     You do not need a Pap test if your uterus was removed (hysterectomy) and you have not had cancer.    You should be tested each year for STDs (sexually transmitted diseases) if you're at risk.     Have a mammogram every 1 to 2 years.    Have a colonoscopy at age 50, or have a yearly FIT test (stool test). These exams screen for colon cancer.      Have a cholesterol test every 5 years, or more often if advised.    Have a diabetes test (fasting glucose) every three years. If you are at risk for diabetes, you should have this test more often.     If you are at risk for osteoporosis (brittle bone disease), think about having a bone density scan (DEXA).    Shots: Get a flu shot each year. Get a tetanus shot every 10 years.    Nutrition:     Eat at least 5 servings of fruits and vegetables each day.    Eat whole-grain bread, whole-wheat pasta and brown rice instead of white grains and rice.    Get adequate Calcium and Vitamin D.     Lifestyle    Exercise at least 150 minutes a week (30 minutes a day, 5 days a week). This will help you control your weight and prevent disease.    Limit alcohol to one drink per day.    No smoking.     Wear sunscreen to prevent skin cancer.     See your dentist every six months for an exam and cleaning.    See your eye doctor every 1 to 2 years.

## 2019-05-13 NOTE — LETTER
May 20, 2019    Mercedes Long  [address]  [place] [address]    Dear ,  This letter is regarding your recent Pap smear (cervical cancer screening) and Human Papillomavirus (HPV) test.  We are happy to inform you that your Pap smear result is normal. Cervical cancer is closely linked with certain types of HPV. Your results showed no evidence of high-risk HPV.  Therefore we recommend you return in 3 years for your next pap smear.  You will still need to return to the clinic every year for an annual exam and other preventive tests.  If you have additional questions regarding this result, please call our registered nurse, Melissa at [phone].  Sincerely,    Laura Bentley PA-C/nicolle

## 2019-05-15 LAB
COPATH REPORT: NORMAL
PAP: NORMAL

## 2019-05-16 ENCOUNTER — TELEPHONE (OUTPATIENT)
Dept: FAMILY MEDICINE | Facility: CLINIC | Age: 54
End: 2019-05-16

## 2019-05-16 NOTE — TELEPHONE ENCOUNTER
Please call patient with the following info:    Please have patient follow up with me to discuss her lab results - non urgent

## 2019-05-17 LAB
FINAL DIAGNOSIS: NORMAL
HPV HR 12 DNA CVX QL NAA+PROBE: NEGATIVE
HPV16 DNA SPEC QL NAA+PROBE: NEGATIVE
HPV18 DNA SPEC QL NAA+PROBE: NEGATIVE
SPECIMEN DESCRIPTION: NORMAL
SPECIMEN SOURCE CVX/VAG CYTO: NORMAL

## 2019-05-22 ENCOUNTER — OFFICE VISIT (OUTPATIENT)
Dept: FAMILY MEDICINE | Facility: CLINIC | Age: 54
End: 2019-05-22
Payer: COMMERCIAL

## 2019-05-22 VITALS
RESPIRATION RATE: 20 BRPM | TEMPERATURE: 97.6 F | OXYGEN SATURATION: 98 % | DIASTOLIC BLOOD PRESSURE: 82 MMHG | SYSTOLIC BLOOD PRESSURE: 128 MMHG | BODY MASS INDEX: 28.76 KG/M2 | HEART RATE: 90 BPM | WEIGHT: 152.2 LBS

## 2019-05-22 DIAGNOSIS — E11.42 TYPE 2 DIABETES MELLITUS WITH DIABETIC POLYNEUROPATHY, WITHOUT LONG-TERM CURRENT USE OF INSULIN (H): Primary | ICD-10-CM

## 2019-05-22 PROBLEM — R73.03 PREDIABETES: Status: RESOLVED | Noted: 2017-05-30 | Resolved: 2019-05-22

## 2019-05-22 PROCEDURE — 99213 OFFICE O/P EST LOW 20 MIN: CPT | Performed by: PHYSICIAN ASSISTANT

## 2019-05-22 PROCEDURE — 99207 C FOOT EXAM  NO CHARGE: CPT | Performed by: PHYSICIAN ASSISTANT

## 2019-05-22 RX ORDER — LISINOPRIL 2.5 MG/1
2.5 TABLET ORAL DAILY
Qty: 90 TABLET | Refills: 3 | Status: SHIPPED | OUTPATIENT
Start: 2019-05-22 | End: 2020-05-19

## 2019-05-22 NOTE — PROGRESS NOTES
Subjective     Mercedes Long is a 53 year old female who presents to clinic today for the following health issues:    HPI   Lab results from 2019    Prediabetes        Amount of exercise or physical activity: None    Problems taking medications regularly: No    Medication side effects: none    Diet: regular (no restrictions)    Would like a liver test done    Patient Active Problem List   Diagnosis     Hypertension, goal below 140/90     Hyperlipidemia LDL goal <100     GERD (gastroesophageal reflux disease)     Seborrheic dermatitis     Type 2 diabetes mellitus with diabetic polyneuropathy, without long-term current use of insulin (H)     Past Surgical History:   Procedure Laterality Date     APPENDECTOMY       CARPAL TUNNEL RELEASE RT/LT Right      COLONOSCOPY WITH CO2 INSUFFLATION N/A 2016    Procedure: COLONOSCOPY WITH CO2 INSUFFLATION;  Surgeon: Duane, William Charles, MD;  Location:  OR       Social History     Tobacco Use     Smoking status: Never Smoker     Smokeless tobacco: Never Used   Substance Use Topics     Alcohol use: No     Alcohol/week: 0.0 oz     Family History   Problem Relation Age of Onset     Diabetes Mother      Hyperlipidemia Mother      Hypertension Mother      Hypertension Sister      Hypertension Brother      Hyperlipidemia Brother      Cerebrovascular Disease Brother      Breast Cancer Maternal Aunt         in her 40s,      Colon Cancer No family hx of              Reviewed and updated as needed this visit by Provider         Review of Systems   ROS COMP: Constitutional, cardiovascular,neuro, skin, endocrine systems are negative, except as otherwise noted.      Objective    /82   Pulse 90   Temp 97.6  F (36.4  C) (Tympanic)   Resp 20   Wt 69 kg (152 lb 3.2 oz)   SpO2 98%   BMI 28.76 kg/m    Body mass index is 28.76 kg/m .  Physical Exam   GENERAL: healthy, alert and no distress  MS: no gross musculoskeletal defects noted, no edema  NEURO: Normal strength  "and tone, mentation intact and speech normal  PSYCH: mentation appears normal, affect normal/bright  Diabetic foot exam: normal DP and PT pulses, no trophic changes or ulcerative lesions, normal sensory exam and normal monofilament exam, except for findings noted on diabetic foot graphical image.      Missed  Right 1, 3-5, 7-9  Left 1, 3, 7-9              Assessment & Plan   Assessment    1. Type 2 diabetes mellitus with diabetic polyneuropathy, without long-term current use of insulin (H)         Plan    Referral to diabetic education and optometry.   Will start lisinopril 2.5mg and continue simvastatin - may change this to atorvastatin in the future if LDL >100.   Follow up OV and labs in 6 months.     BMI:   Estimated body mass index is 28.76 kg/m  as calculated from the following:    Height as of 4/1/19: 1.549 m (5' 1\").    Weight as of this encounter: 69 kg (152 lb 3.2 oz).     Return in about 6 months (around 11/22/2019) for a med check, repeat labs.    Laura Bentley PA-C  [facility] [place]        "

## 2019-08-08 ENCOUNTER — TELEPHONE (OUTPATIENT)
Dept: FAMILY MEDICINE | Facility: CLINIC | Age: 54
End: 2019-08-08

## 2019-08-08 ENCOUNTER — ALLIED HEALTH/NURSE VISIT (OUTPATIENT)
Dept: EDUCATION SERVICES | Facility: CLINIC | Age: 54
End: 2019-08-08
Payer: COMMERCIAL

## 2019-08-08 VITALS — WEIGHT: 151 LBS | BODY MASS INDEX: 28.53 KG/M2

## 2019-08-08 DIAGNOSIS — E11.42 TYPE 2 DIABETES MELLITUS WITH DIABETIC POLYNEUROPATHY, WITHOUT LONG-TERM CURRENT USE OF INSULIN (H): Primary | ICD-10-CM

## 2019-08-08 PROCEDURE — G0108 DIAB MANAGE TRN  PER INDIV: HCPCS

## 2019-08-08 PROCEDURE — T1013 SIGN LANG/ORAL INTERPRETER: HCPCS | Mod: U3

## 2019-08-08 RX ORDER — BLOOD-GLUCOSE METER
EACH MISCELLANEOUS
Qty: 1 KIT | Refills: 0 | Status: SHIPPED | OUTPATIENT
Start: 2019-08-08

## 2019-08-08 RX ORDER — LANCETS 33 GAUGE
1 EACH MISCELLANEOUS DAILY
Qty: 100 EACH | Refills: 3 | Status: SHIPPED | OUTPATIENT
Start: 2019-08-08 | End: 2021-01-14

## 2019-08-08 NOTE — TELEPHONE ENCOUNTER
Please clarify:  Test strips are for four times a day, and lancets are for once daily.  Please clarify the correct daily usage.

## 2019-08-08 NOTE — PROGRESS NOTES
"Diabetes Self-Management Education & Support    Diabetes Education Self Management & Training    SUBJECTIVE/OBJECTIVE:  Presents for: Initial Assessment for new diagnosis  Accompanied by: Self,   Diabetes education in the past 24mo: No  Focus of Visit: Monitoring, Assistance w/ making life changes, Healthy Eating  Diabetes type: Type 2  Date of diagnosis: 5/22/19  How confident are you filling out medical forms by yourself:: Not Assessed  Diabetes management related comments/concerns: I want to know how I'm supposed to eat for diabetes  Transportation concerns: No  Other concerns:: Language barrier  Cultural Influences/Ethnic Background:  Bulgarian    Diabetes Symptoms & Complications  Blurred vision: No  Fatigue: No  Neuropathy: No  Foot ulcerations: No  Polydipsia: No  Polyphagia: No  Polyuria: No  Visual change: No  Weakness: No  Weight loss: No  Slow healing wounds: No  Recent Infection(s): No  Weight trend: Decreasing steadily  Autonomic neuropathy: No  CVA: No  Heart disease: No  Nephropathy: No  Peripheral neuropathy: No  Peripheral Vascular Disease: No  Retinopathy: No  Sexual dysfunction: No    Patient Problem List and Family Medical History reviewed for relevant medical history, current medical status, and diabetes risk factors.    Vitals:  Wt 68.5 kg (151 lb)   BMI 28.53 kg/m     Estimated body mass index is 28.53 kg/m  as calculated from the following:    Height as of 4/1/19: 1.549 m (5' 1\").    Weight as of this encounter: 68.5 kg (151 lb).     Wt Readings from Last 5 Encounters:   08/08/19 68.5 kg (151 lb)   05/22/19 69 kg (152 lb 3.2 oz)   05/13/19 69.7 kg (153 lb 9.6 oz)   04/01/19 68.5 kg (151 lb)   03/15/19 68.5 kg (151 lb)       Last 3 BP:   BP Readings from Last 3 Encounters:   05/22/19 128/82   05/13/19 125/80   04/01/19 129/85       History   Smoking Status     Never Smoker   Smokeless Tobacco     Never Used       Labs:  Lab Results   Component Value Date    A1C 6.9 05/13/2019 "     Lab Results   Component Value Date     05/13/2019     Lab Results   Component Value Date    LDL 82 05/13/2019     HDL Cholesterol   Date Value Ref Range Status   05/13/2019 75 >49 mg/dL Final   ]  GFR Estimate   Date Value Ref Range Status   05/13/2019 >90 >60 mL/min/[1.73_m2] Final     Comment:     Non  GFR Calc  Starting 12/18/2018, serum creatinine based estimated GFR (eGFR) will be   calculated using the Chronic Kidney Disease Epidemiology Collaboration   (CKD-EPI) equation.       GFR Estimate If Black   Date Value Ref Range Status   05/13/2019 >90 >60 mL/min/[1.73_m2] Final     Comment:      GFR Calc  Starting 12/18/2018, serum creatinine based estimated GFR (eGFR) will be   calculated using the Chronic Kidney Disease Epidemiology Collaboration   (CKD-EPI) equation.       Lab Results   Component Value Date    CR 0.52 05/13/2019     No results found for: MICROALBUMIN    Healthy Eating  Healthy Eating Assessed Today: Yes  Cultural/Christianity diet restrictions?: No  Patient on a regular basis: Skips meals regularly, Has a high intake of sugar-sweetened beverages  Meal planning: None  Meals include: Breakfast, Lunch, Dinner, Snacks  Breakfast:  pancake 2, coffee with cream and sweetner OR Ramen noodles   Lunch: rice (3/4 cup) with soy sauce  Dinner: noodles 1 cup with broth, shrimp/squid  Beverages: Water, Juice, Milk(Juice 1 cup 4-5 days per week, Milk 6 cups/day) especially when there's no time for meals.  Has patient met with a dietitian in the past?: No    No vegetables yesterday    Frequently deep fried tofu and soysauce    Being Active  Being Active Assessed Today: Yes  Exercise:: Yes  Days per week of moderate to strenuous exercise (like a brisk walk): 5  On average, minutes per day of exercise at this level: 150 or greater  How intense was your typical exercise? : Moderate (like brisk walking)  Exercise Minutes per Week: 750  Barrier to exercise: None    Works as a  [employer] 5 days per week 12 hours    Exercise- walk 45 minutes on off days    Monitoring  Monitoring Assessed Today: Yes  Did patient bring glucose meter to appointment? : No  Home Glucose (Sugar) Monitoring: Never    Taking Medications  Taking Medication Assessed Today: Yes  Current Treatments: None, Diet    Problem Solving  Problem Solving Assessed Today: No    Reducing Risks  Reducing Risks Assessed Today: No  Diabetes Risks: Age over 45 years, Ethnicity  CAD Risks: Diabetes Mellitus, Hypertension, Obesity, Post-menopausal    Healthy Coping  Healthy Coping Assessed Today: Yes  Emotional response to diabetes: Ready to learn, Confidence diabetes can be controlled  Informal Support system:: Family  Stage of change: PREPARATION (Decided to change - considering how)  Support resources: None  Patient Activation Measure Survey Score:  No flowsheet data found.    ASSESSMENT:  Patients current intake from milk and Juice is equivalent to an estimated 100g per day of carbohydrate.  Current portions of rice and rice noodle are appropriate, but likely not enough to meet her needs from her active job.  24 hour recall revealed high carbohydrate breakfast and lunch and protein only at dinner.  Patient would benefit from increasing portions of rice, meat, and vegetables at breakfast lunch and dinner and reduce milk and juice intake to as little as possible.  Patients main food barriers are time spent at work.      Patient's most recent   Lab Results   Component Value Date    A1C 6.9 05/13/2019    is not meeting goal of 6.5    INTERVENTION:   Diabetes knowledge and skills assessment:     Patient is knowledgeable in diabetes management concepts related to: Healthy Coping    Patient needs further education on the following diabetes management concepts: Healthy Eating and Monitoring    Based on learning assessment above, most appropriate setting for further diabetes education would be: Individual setting.    Education provided today  on:  AADE Self-Care Behaviors:  Diabetes Pathophysiology  Healthy Eating: carbohydrate counting, consistency in amount, composition, and timing of food intake, weight reduction, heart healthy diet, portion control, plate planning method and label reading  Monitoring: purpose, proper technique, log and interpret results, individual blood glucose targets, frequency of monitoring and proper sharps disposal  Patient was instructed on One Touch Verio meter and was able to provide an accurate return demonstration. Patient's blood glucose reading today was 137 mg/dL, fasting.    Opportunities for ongoing education and support in diabetes-self management were discussed.    Pt verbalized understanding of concepts discussed and recommendations provided today.       Education Materials Provided:  One Touch Verio meter kit and German language handouts (what is diabetes, how to check blood sugar, plate method)    PLAN:  See Patient Instructions for co-developed, patient-stated behavior change goals.  AVS printed and provided to patient today. See Follow-Up section for recommended follow-up.    Patient to check blood sugar 1-2 times per day.    Use plate method for Breakfast, lunch, and dinner.    Lori Baldwin MS, RD, LD, CDE    Time Spent: 80 minutes  Encounter Type: Individual    Any diabetes medication dose changes were made via the CDE Protocol and Collaborative Practice Agreement with the patient's primary care provider. A copy of this encounter was shared with the provider.

## 2019-08-09 ENCOUNTER — TELEPHONE (OUTPATIENT)
Dept: FAMILY MEDICINE | Facility: CLINIC | Age: 54
End: 2019-08-09

## 2019-08-09 DIAGNOSIS — E11.42 TYPE 2 DIABETES MELLITUS WITH DIABETIC POLYNEUROPATHY, WITHOUT LONG-TERM CURRENT USE OF INSULIN (H): ICD-10-CM

## 2019-08-09 NOTE — TELEPHONE ENCOUNTER
Rn spoke with pharmacy and confirmed patient is to check 1-2 times daily on all testing suppliers.   No further questions at this time.     Ceirra Boyle RN, BSN, PHN

## 2019-08-09 NOTE — TELEPHONE ENCOUNTER
Pharmacy is calling to speak with nurse the script for the test strips state 1-2 times a day the lancets state 1 a day but they also have a script for this 4 times a day     Please call pharmacy to clarify    Thank you

## 2019-08-09 NOTE — TELEPHONE ENCOUNTER
RN spoke with RD and RD resent lancets and test strips per diabetic education.   blood glucose (ONETOUCH VERIO IQ) test strip 100 each 3 8/9/2019  No   Sig: Use to test blood sugars 1-2 times daily or as directed.     ONETOUCH DELICA LANCETS 33G MISC 100 each 3 8/8/2019  --   Sig - Route: 1 each daily - Does not apply     Cierra Boyle RN, BSN, PHN

## 2019-10-03 ENCOUNTER — ALLIED HEALTH/NURSE VISIT (OUTPATIENT)
Dept: EDUCATION SERVICES | Facility: CLINIC | Age: 54
End: 2019-10-03
Payer: COMMERCIAL

## 2019-10-03 VITALS — BODY MASS INDEX: 28.53 KG/M2 | WEIGHT: 151 LBS

## 2019-10-03 DIAGNOSIS — E11.42 TYPE 2 DIABETES MELLITUS WITH DIABETIC POLYNEUROPATHY, WITHOUT LONG-TERM CURRENT USE OF INSULIN (H): Primary | ICD-10-CM

## 2019-10-03 PROCEDURE — G0108 DIAB MANAGE TRN  PER INDIV: HCPCS

## 2019-10-03 NOTE — Clinical Note
VANESSA she stopped the lisinopril due to dizziness.  She does have a DM check scheduled in 1 month.Lori

## 2019-10-03 NOTE — PROGRESS NOTES
"Diabetes Self-Management Education & Support    Diabetes Education Self Management & Training    SUBJECTIVE/OBJECTIVE:  Presents for: Initial Assessment for new diagnosis  Accompanied by: Self,   Diabetes education in the past 24mo: Yes  Focus of Visit: Monitoring, Assistance w/ making life changes, Healthy Eating  Diabetes type: Type 2  Date of diagnosis: 5/22/19  Disease course: Stable  How confident are you filling out medical forms by yourself:: Not Assessed  Diabetes management related comments/concerns: Why are some of my bedtime readings higher?  Transportation concerns: No  Other concerns:: Language barrier  Cultural Influences/Ethnic Background:  English      Diabetes Symptoms & Complications  Blurred vision: No  Fatigue: No  Neuropathy: No  Foot ulcerations: No  Polydipsia: No  Polyphagia: No  Polyuria: No  Visual change: No  Weakness: No  Weight loss: No  Slow healing wounds: No  Recent Infection(s): No  Weight trend: Stable  Autonomic neuropathy: No  CVA: No  Heart disease: No  Nephropathy: No  Peripheral neuropathy: No  Peripheral Vascular Disease: No  Retinopathy: No  Sexual dysfunction: No    Patient Problem List and Family Medical History reviewed for relevant medical history, current medical status, and diabetes risk factors.    Vitals:  Wt 68.5 kg (151 lb)   BMI 28.53 kg/m    Estimated body mass index is 28.53 kg/m  as calculated from the following:    Height as of 4/1/19: 1.549 m (5' 1\").    Weight as of 8/8/19: 68.5 kg (151 lb).     Wt Readings from Last 5 Encounters:   10/03/19 68.5 kg (151 lb)   08/08/19 68.5 kg (151 lb)   05/22/19 69 kg (152 lb 3.2 oz)   05/13/19 69.7 kg (153 lb 9.6 oz)   04/01/19 68.5 kg (151 lb)     Last 3 BP:   BP Readings from Last 3 Encounters:   05/22/19 128/82   05/13/19 125/80   04/01/19 129/85       History   Smoking Status     Never Smoker   Smokeless Tobacco     Never Used       Labs:  Lab Results   Component Value Date    A1C 6.9 05/13/2019     Lab " Results   Component Value Date     05/13/2019     Lab Results   Component Value Date    LDL 82 05/13/2019     HDL Cholesterol   Date Value Ref Range Status   05/13/2019 75 >49 mg/dL Final   ]  GFR Estimate   Date Value Ref Range Status   05/13/2019 >90 >60 mL/min/[1.73_m2] Final     Comment:     Non  GFR Calc  Starting 12/18/2018, serum creatinine based estimated GFR (eGFR) will be   calculated using the Chronic Kidney Disease Epidemiology Collaboration   (CKD-EPI) equation.       GFR Estimate If Black   Date Value Ref Range Status   05/13/2019 >90 >60 mL/min/[1.73_m2] Final     Comment:      GFR Calc  Starting 12/18/2018, serum creatinine based estimated GFR (eGFR) will be   calculated using the Chronic Kidney Disease Epidemiology Collaboration   (CKD-EPI) equation.       Lab Results   Component Value Date    CR 0.52 05/13/2019     No results found for: MICROALBUMIN    Healthy Eating  Healthy Eating Assessed Today: Yes  Cultural/Amish diet restrictions?: No  Patient on a regular basis: Has an inconsistent intake of carbohydrates  Meal planning: None  Meals include: Breakfast, Lunch, Dinner, Snacks  Breakfast: Fruit and milk smoothie- 1.5 cups- (1 banana, milk, kiwi, strawberry-sometimes yogurt)  Lunch: Rice noodles in soup-fish and a lot of veggies with   Dinner: 1/2 bahn mi- with chicken, cucumber, cilantro-water  Snacks: apple or orange  Beverages: Water(Juice 1 cup 4-5 days per week, Milk 6 cups/day)  Has patient met with a dietitian in the past?: No      Since last visit-limited bread, rice and noodles,     Overall feels more energy since last visit       Being Active  Being Active Assessed Today: Yes  Exercise:: Yes  Days per week of moderate to strenuous exercise (like a brisk walk): 1  On average, minutes per day of exercise at this level: 60  How intense was your typical exercise? : Moderate (like brisk walking)(also works as a cook at a restaurant all  day)  Exercise Minutes per Week: 60  Barrier to exercise: None    Monitoring  Monitoring Assessed Today: Yes  Did patient bring glucose meter to appointment? : No  Blood Glucose Meter: One Touch  Home Glucose (Sugar) Monitorin-2 times per day  Blood glucose trend: Decreasing steadily  Low Glucose Range (mg/dL):   High Glucose Range (mg/dL): 180-200  Overall Range (mg/dL): 110-130(fasting only)    Checking BG regularly-8:30am- fasting    Bedtime checks are 2 hours after meals- 9/3 &  vs.  lower intake of carbohydrates         Taking Medications  Taking Medication Assessed Today: Yes  Current Treatments: None, Diet  Problems taking diabetes medications regularly?: Yes(Stopped taking lisinopril due to feeling dizzy)    Problem Solving  Problem Solving Assessed Today: No    Reducing Risks  Reducing Risks Assessed Today: Yes  Diabetes Risks: Age over 45 years, Ethnicity  CAD Risks: Diabetes Mellitus, Hypertension, Obesity, Post-menopausal  Has dilated eye exam at least once a year?: No  Sees dentist every 6 months?: No(Saw dentist in past year but unsure what month)  Sees podiatrist (foot doctor)?: No    Healthy Coping  Healthy Coping Assessed Today: Yes  Emotional response to diabetes: Ready to learn, Confidence diabetes can be controlled  Informal Support system:: Family  Stage of change: MAINTENANCE (Working to maintain change, with risk of relapse)  Difficulty affording diabetes management supplies?: No  Support resources: None  Patient Activation Measure Survey Score:  No flowsheet data found.    ASSESSMENT:  Overall patient has made changes to daily eating pattern.  She is eating on a regular basis and has stopped drinking milk and high portions of rice and rice noodles.  She report feeling higher energy and no loss of energy during her work day which indicates she has not cut out too much.  She would benefit from more protein at breakfast given it is currently high in carbohydrates, but due to her  job a high carbohydrate breakfast is needed.  To further assess her blood sugars she would benefit from doing a before dinner check or 2 hour after dinner check to know how her meals are impacting blood sugars.    Patient did stop taking lisinopril due to feeling dizzy.  Recommended discussing further with PCP, and that it was a very low dose prescribed mainly to protect kidney function.    Patient's most recent   Lab Results   Component Value Date    A1C 6.9 05/13/2019    is meeting goal of <7.0    INTERVENTION:   Diabetes knowledge and skills assessment:     Patient is knowledgeable in diabetes management concepts related to: Healthy Eating, Being Active, Monitoring and Healthy Coping    Patient needs further education on the following diabetes management concepts: Monitoring, Taking Medication and Reducing Risks    Based on learning assessment above, most appropriate setting for further diabetes education would be: Individual setting.    Education provided today on:  AADE Self-Care Behaviors:  Monitoring: log and interpret results, individual blood glucose targets and frequency of monitoring  Taking Medication: Discussed Aspirin, blood pressure medication, statin, and why they are recommended.    Reducing Risks: major complications of diabetes, prevention, early diagnostic measures and treatment of complications, foot care, appropriate dental care, annual eye exam, aspirin therapy, A1C - goals, relating to blood glucose levels, how often to check, lipids levels and goals and blood pressure and goals    Opportunities for ongoing education and support in diabetes-self management were discussed.    Pt verbalized understanding of concepts discussed and recommendations provided today.       Education Materials Provided:  BG Log Sheet    PLAN:  See Patient Instructions for co-developed, patient-stated behavior change goals.  AVS printed and provided to patient today. See Follow-Up section for recommended  follow-up.    Lori Baldwin MS, RD, LD, CDE    Time Spent: 60 minutes  Encounter Type: Individual    Any diabetes medication dose changes were made via the CDE Protocol and Collaborative Practice Agreement with the patient's primary care provider. A copy of this encounter was shared with the provider.

## 2019-10-24 ENCOUNTER — TRANSFERRED RECORDS (OUTPATIENT)
Dept: HEALTH INFORMATION MANAGEMENT | Facility: CLINIC | Age: 54
End: 2019-10-24

## 2019-11-01 ENCOUNTER — OFFICE VISIT (OUTPATIENT)
Dept: FAMILY MEDICINE | Facility: CLINIC | Age: 54
End: 2019-11-01
Payer: COMMERCIAL

## 2019-11-01 VITALS
HEIGHT: 61 IN | DIASTOLIC BLOOD PRESSURE: 83 MMHG | TEMPERATURE: 96.2 F | HEART RATE: 76 BPM | SYSTOLIC BLOOD PRESSURE: 129 MMHG | RESPIRATION RATE: 18 BRPM | WEIGHT: 150.8 LBS | BODY MASS INDEX: 28.47 KG/M2 | OXYGEN SATURATION: 96 %

## 2019-11-01 DIAGNOSIS — Z23 NEED FOR VACCINATION: ICD-10-CM

## 2019-11-01 DIAGNOSIS — E11.42 TYPE 2 DIABETES MELLITUS WITH DIABETIC POLYNEUROPATHY, WITHOUT LONG-TERM CURRENT USE OF INSULIN (H): ICD-10-CM

## 2019-11-01 DIAGNOSIS — M25.562 ACUTE PAIN OF LEFT KNEE: Primary | ICD-10-CM

## 2019-11-01 LAB
HBA1C MFR BLD: 6.6 % (ref 0–5.6)
TSH SERPL DL<=0.005 MIU/L-ACNC: 2.2 MU/L (ref 0.4–4)

## 2019-11-01 PROCEDURE — 83036 HEMOGLOBIN GLYCOSYLATED A1C: CPT | Performed by: PHYSICIAN ASSISTANT

## 2019-11-01 PROCEDURE — 36415 COLL VENOUS BLD VENIPUNCTURE: CPT | Performed by: PHYSICIAN ASSISTANT

## 2019-11-01 PROCEDURE — 90732 PPSV23 VACC 2 YRS+ SUBQ/IM: CPT | Performed by: PHYSICIAN ASSISTANT

## 2019-11-01 PROCEDURE — 90472 IMMUNIZATION ADMIN EACH ADD: CPT | Performed by: PHYSICIAN ASSISTANT

## 2019-11-01 PROCEDURE — 90686 IIV4 VACC NO PRSV 0.5 ML IM: CPT | Performed by: PHYSICIAN ASSISTANT

## 2019-11-01 PROCEDURE — 90471 IMMUNIZATION ADMIN: CPT | Performed by: PHYSICIAN ASSISTANT

## 2019-11-01 PROCEDURE — 84443 ASSAY THYROID STIM HORMONE: CPT | Performed by: PHYSICIAN ASSISTANT

## 2019-11-01 PROCEDURE — 99214 OFFICE O/P EST MOD 30 MIN: CPT | Mod: 25 | Performed by: PHYSICIAN ASSISTANT

## 2019-11-01 ASSESSMENT — MIFFLIN-ST. JEOR: SCORE: 1218.65

## 2019-11-01 NOTE — LETTER
November 5, 2019      Mercedes Long  [address]  [place] [address]        Dear ,    We are writing to inform you of your test results.    Your A1c is <7% which means your diabetes is under good control.   Thyroid is normal.     Resulted Orders   TSH WITH FREE T4 REFLEX   Result Value Ref Range    TSH 2.20 0.40 - 4.00 mU/L   HEMOGLOBIN A1C   Result Value Ref Range    Hemoglobin A1C 6.6 (H) 0 - 5.6 %      Comment:      Normal <5.7% Prediabetes 5.7-6.4%  Diabetes 6.5% or higher - adopted from ADA   consensus guidelines.         If you have any questions or concerns, please call the clinic at the number listed above.       Sincerely,        Laura Bentley PA-C/sawo

## 2019-11-01 NOTE — PATIENT INSTRUCTIONS
Tylenol: 500-1000mg every 6-8 hours, max of 3,000mg per day  Ibuprofen: 600mg every 6-8 hours, max of 2,400mg per day

## 2019-11-01 NOTE — PROGRESS NOTES
Subjective     Mercedes Long is a 54 year old female who presents to clinic today for the following health issues:    HPI   Musculoskeletal problem/pain      Duration: x2 months    Description  Location: left knee    Intensity:  moderate    Accompanying signs and symptoms: warmth, swelling and redness    History  Previous similar problem: YES- over a year 2017  Previous evaluation:  x-ray-09/21/2017    Precipitating or alleviating factors:  Trauma or overuse: no   Aggravating factors include: standing, walking, climbing stairs and bending     Therapies tried and outcome: acetaminophen    Fell in 2017      XR KNEE STANDING AP BILAT AND LATERAL LEFT 9/21/2017 10:35 AM  HISTORY: Pain.  COMPARISON: None.                                                          IMPRESSION: No evidence of acute fracture or malalignment. Small knee  effusion.      PROBLEMS TO ADD ON...  Fasting   -------------------------------------    Patient Active Problem List   Diagnosis     Hypertension, goal below 140/90     Hyperlipidemia LDL goal <100     GERD (gastroesophageal reflux disease)     Seborrheic dermatitis     Type 2 diabetes mellitus with diabetic polyneuropathy, without long-term current use of insulin (H)     Past Surgical History:   Procedure Laterality Date     APPENDECTOMY       CARPAL TUNNEL RELEASE RT/LT Right      COLONOSCOPY WITH CO2 INSUFFLATION N/A 5/18/2016    Procedure: COLONOSCOPY WITH CO2 INSUFFLATION;  Surgeon: Duane, William Charles, MD;  Location:  OR       Social History     Tobacco Use     Smoking status: Never Smoker     Smokeless tobacco: Never Used   Substance Use Topics     Alcohol use: No     Alcohol/week: 0.0 standard drinks     Family History   Problem Relation Age of Onset     Diabetes Mother      Hyperlipidemia Mother      Hypertension Mother      Hypertension Sister      Hypertension Brother      Hyperlipidemia Brother      Cerebrovascular Disease Brother      Breast Cancer Maternal Aunt         in her  "40s,      Colon Cancer No family hx of            Reviewed and updated as needed this visit by Provider         Review of Systems   ROS COMP: Constitutional, musculoskeletal, neuro, endocrine systems are negative, except as otherwise noted.      Objective    /83   Pulse 76   Temp 96.2  F (35.7  C) (Tympanic)   Resp 18   Ht 1.561 m (5' 1.46\")   Wt 68.4 kg (150 lb 12.8 oz)   SpO2 96%   BMI 28.07 kg/m    Body mass index is 28.07 kg/m .  Physical Exam   GENERAL: healthy, alert and no distress  MS: no gross musculoskeletal defects noted, no edema  MS: LLE exam shows normal strength and muscle mass, ROM of all joints is normal, no evidence of joint instability and neg ant/post drawers, varus/valgus  NEURO: Normal strength and tone, mentation intact and speech normal  PSYCH: mentation appears normal, affect normal/bright        Assessment & Plan   Assessment  1. Acute pain of left knee    2. Type 2 diabetes mellitus with diabetic polyneuropathy, without long-term current use of insulin (H)    3. Need for vaccination         Plan  1) Referral to physical therapy. Supportive therapy also discussed. Follow up if symptoms fail to improve or worsen.      2) Routine labs today.     BMI:   Estimated body mass index is 28.07 kg/m  as calculated from the following:    Height as of this encounter: 1.561 m (5' 1.46\").    Weight as of this encounter: 68.4 kg (150 lb 12.8 oz).     Return in about 2 months (around 2020), or if knee symptoms worsen or fail to improve.    Laura Bentley PA-C  [facility]          "

## 2019-11-04 ENCOUNTER — THERAPY VISIT (OUTPATIENT)
Dept: PHYSICAL THERAPY | Facility: CLINIC | Age: 54
End: 2019-11-04
Payer: COMMERCIAL

## 2019-11-04 DIAGNOSIS — G89.29 CHRONIC PAIN OF LEFT KNEE: ICD-10-CM

## 2019-11-04 DIAGNOSIS — M25.562 CHRONIC PAIN OF LEFT KNEE: ICD-10-CM

## 2019-11-04 PROCEDURE — 97162 PT EVAL MOD COMPLEX 30 MIN: CPT | Mod: GP | Performed by: PHYSICAL THERAPIST

## 2019-11-04 PROCEDURE — 97110 THERAPEUTIC EXERCISES: CPT | Mod: GP | Performed by: PHYSICAL THERAPIST

## 2019-11-04 NOTE — LETTER
DEPARTMENT OF HEALTH AND HUMAN SERVICES  [facility]    PLAN/UPDATED PLAN OF PROGRESS FOR OUTPATIENT REHABILITATION    PATIENTS NAME:  Mercedes Long   : 1965  PROVIDER NUMBER:    [id number]  [id number]   PROVIDER NAME: TALIA JAMA  MEDICAL RECORD NUMBER: [id number]   START OF CARE DATE:  SOC Date: 19   TYPE:  PT  PRIMARY/TREATMENT DIAGNOSIS: (Pertinent Medical Diagnosis)  Chronic pain of left knee  VISITS FROM START OF CARE:  Rxs Used: 1     [facility] Initial Evaluation  Subjective:  The history is provided by the patient. The history is limited by a language barrier. A  was used.   This is a chronic condition    Patient reports pain:  In the joint.  Associated symptoms:  Edema and loss of strength. Symptoms are exacerbated by kneeling, bending/squatting, sitting and ascending stairs and relieved by rest.  Mercedes Long being seen for left knee pain.   Problem began 2019 (date of MD referral). Where condition occurred: at work.Problem occurred: due to a fall.  Fell and landed directly on left knee approximately 2 years ago  General health as reported by patient is good. Pertinent medical history includes:  High blood pressure, incontinence, menopausal and depression.  Medical allergies: none.  Surgeries include:  None.  Current medications:  Anti-depressants and high blood pressure medication.   Primary job tasks include:  Prolonged standing and repetitive tasks.  Pain is described as aching (rated as 5/10) and is intermittent. Pain is worse in the P.M.. Since onset symptoms are unchanged.  Previous treatment includes physical therapy. There was moderate improvement following previous treatment.   Patient is [employer]. Restrictions include:  Working in normal job without restrictions.  Barriers include:  Stairs and bathroom/bedroom on second floor.  Red flags:  None as reported by patient.  Patient's Goals/Expectations:   To get rid of pain in her knee               Objective:  Standing Alignment:    Knee:  Normal  Gait:    Gait Type:  Normal     Non-Weight Bearing:    Knee:  Tibial varus L and tibial varus R  Knee Evaluation:  ROM:  AROM: normal  PROM: normal    Strength:   Extension:  Left: 4+/5    Pain:-      Right: 5/5    Pain:-  Flexion:  Left: 5/5    Pain:-      Right: 5/5    Pain:-    Quad Set Left: Good    Pain:   Quad Set Right: Good    Pain:  Ligament Testing:  Normal    PATIENTS NAME:  Mercedes Long   : 1965    Special Tests:   Left knee positive for the following special tests:  Patellar Compression  Left knee negative for the following special tests:  Meninscal  Palpation:    Left knee tenderness present at:  Medial Joint Line; Lateral Joint Line; Popliteal; Patellar Medial and Patellar Lateral  Edema:  Normal  Mobility Testing:  Normal  Functional Testing:    Quad:    Single Leg Squat:  Left:      Unable   Right:      Unable   Bilateral Leg Squat:   Decreased hip/trunk flexion    Hip Strength:  Hip flexion: 4+/5 right and 5/5 left  Abductors 4/5 bilaterally  Glute Max 3+/5 bilat      Assessment/Plan:    Patient is a 54 year old female with left knee complaints.    Patient has the following significant findings with corresponding treatment plan.                Diagnosis 1:  Left Knee Pain  Pain -  self management, education and home program  Decreased strength - therapeutic exercise and therapeutic activities  Decreased function - therapeutic activities and home program    Therapy Evaluation Codes:   1) History comprised of:   Personal factors that impact the plan of care:      Time since onset of symptoms.    Comorbidity factors that impact the plan of care are:      Depression, High blood pressure and Menopausal.     Medications impacting care: Anti-depressant and High blood pressure.  2) Examination of Body Systems comprised of:   Body structures and functions that impact the plan of care:      Hip and  "Knee.   Activity limitations that impact the plan of care are:      Squatting/kneeling and Stairs.  3) Clinical presentation characteristics are:   Stable/Uncomplicated.  4) Decision-Making    Moderate complexity using standardized patient assessment instrument and/or measureable assessment of functional outcome.  Cumulative Therapy Evaluation is: Moderate complexity.  Previous and current functional limitations:  (See Goal Flow Sheet for this information)    Short term and Long term goals: (See Goal Flow Sheet for this information)   Communication ability:  Patient appears to be able to clearly communicate and understand verbal and written communication and follow directions correctly.  Treatment Explanation - The following has been discussed with the patient:    RX ordered/plan of care  PATIENTS NAME:  Mercedes Long   : 1965    Anticipated outcomes  Possible risks and side effects  This patient would benefit from PT intervention to resume normal activities.   Rehab potential is good.  Frequency:  1 X week, once daily  Duration:  for 6 weeks  Discharge Plan:  Achieve all LTG.  Independent in home treatment program.  Reach maximal therapeutic benefit.        Caregiver Signature/Credentials _____________________________ Date ________      Treating Provider: Brianna Medina, PT, OCS   I have reviewed and certified the need for these services and plan of treatment while under my care.        PHYSICIAN'S SIGNATURE:   _________________________________________  Date___________   Laura Bentley PA-C    Certification period:  Beginning of Cert date period: 19 to  End of Cert period date: 20     Functional Level Progress Report: Please see attached \"Goal Flow sheet for Functional level.\"    ____X____ Continue Services or       ________ DC Services                Service dates: From  SOC Date: 19 date to present                         "

## 2019-11-04 NOTE — PROGRESS NOTES
[facility] Initial Evaluation  Subjective:  The history is provided by the patient. The history is limited by a language barrier. A  was used.     This is a chronic condition    Patient reports pain:  In the joint.  Associated symptoms:  Edema and loss of strength. Symptoms are exacerbated by kneeling, bending/squatting, sitting and ascending stairs and relieved by rest.    Mercedes Long being seen for left knee pain.   Problem began 11/1/2019 (date of MD referral). Where condition occurred: at work.Problem occurred: due to a fall.  Fell and landed directly on left knee approximately 2 years ago  General health as reported by patient is good. Pertinent medical history includes:  High blood pressure, incontinence, menopausal and depression.  Medical allergies: none.  Surgeries include:  None.  Current medications:  Anti-depressants and high blood pressure medication.   Primary job tasks include:  Prolonged standing and repetitive tasks.  Pain is described as aching (rated as 5/10) and is intermittent. Pain is worse in the P.M.. Since onset symptoms are unchanged.  Previous treatment includes physical therapy. There was moderate improvement following previous treatment.   Patient is [employer]. Restrictions include:  Working in normal job without restrictions.    Barriers include:  Stairs and bathroom/bedroom on second floor.  Red flags:  None as reported by patient.    Patient's Goals/Expectations:  To get rid of pain in her knee                 Objective:  Standing Alignment:              Knee:  Normal      Gait:    Gait Type:  Normal       Non-Weight Bearing:        Knee:  Tibial varus L and tibial varus R                                                        Knee Evaluation:  ROM:  AROM: normal  PROM: normal            Strength:     Extension:  Left: 4+/5    Pain:-      Right: 5/5    Pain:-  Flexion:  Left: 5/5    Pain:-      Right: 5/5    Pain:-    Quad Set Left: Good    Pain:    Quad Set Right: Good    Pain:  Ligament Testing:  Normal                Special Tests:   Left knee positive for the following special tests:  Patellar Compression  Left knee negative for the following special tests:  Meninscal    Palpation:    Left knee tenderness present at:  Medial Joint Line; Lateral Joint Line; Popliteal; Patellar Medial and Patellar Lateral    Edema:  Normal    Mobility Testing:  Normal            Functional Testing:          Quad:    Single Leg Squat:  Left:      Unable   Right:      Unable   Bilateral Leg Squat:   Decreased hip/trunk flexion          Hip Strength:  Hip flexion: 4+/5 right and 5/5 left  Abductors 4/5 bilaterally  Glute Max 3+/5 bilat    General     ROS    Assessment/Plan:    Patient is a 54 year old female with left knee complaints.    Patient has the following significant findings with corresponding treatment plan.                Diagnosis 1:  Left Knee Pain  Pain -  self management, education and home program  Decreased strength - therapeutic exercise and therapeutic activities  Decreased function - therapeutic activities and home program    Therapy Evaluation Codes:   1) History comprised of:   Personal factors that impact the plan of care:      Time since onset of symptoms.    Comorbidity factors that impact the plan of care are:      Depression, High blood pressure and Menopausal.     Medications impacting care: Anti-depressant and High blood pressure.  2) Examination of Body Systems comprised of:   Body structures and functions that impact the plan of care:      Hip and Knee.   Activity limitations that impact the plan of care are:      Squatting/kneeling and Stairs.  3) Clinical presentation characteristics are:   Stable/Uncomplicated.  4) Decision-Making    Moderate complexity using standardized patient assessment instrument and/or measureable assessment of functional outcome.  Cumulative Therapy Evaluation is: Moderate complexity.    Previous and current functional  limitations:  (See Goal Flow Sheet for this information)    Short term and Long term goals: (See Goal Flow Sheet for this information)     Communication ability:  Patient appears to be able to clearly communicate and understand verbal and written communication and follow directions correctly.  Treatment Explanation - The following has been discussed with the patient:    RX ordered/plan of care  Anticipated outcomes  Possible risks and side effects  This patient would benefit from PT intervention to resume normal activities.   Rehab potential is good.    Frequency:  1 X week, once daily  Duration:  for 6 weeks  Discharge Plan:  Achieve all LTG.  Independent in home treatment program.  Reach maximal therapeutic benefit.    Please refer to the daily flowsheet for treatment today, total treatment time and time spent performing 1:1 timed codes.

## 2019-11-05 ASSESSMENT — ACTIVITIES OF DAILY LIVING (ADL)
SIT WITH YOUR KNEE BENT: ACTIVITY IS VERY DIFFICULT
PAIN: THE SYMPTOM AFFECTS MY ACTIVITY SLIGHTLY
RAW_SCORE: 51
AS_A_RESULT_OF_YOUR_KNEE_INJURY,_HOW_WOULD_YOU_RATE_YOUR_CURRENT_LEVEL_OF_DAILY_ACTIVITY?: NEARLY NORMAL
WEAKNESS: I HAVE THE SYMPTOM BUT IT DOES NOT AFFECT MY ACTIVITY
KNEE_ACTIVITY_OF_DAILY_LIVING_SCORE: 72.86
RISE FROM A CHAIR: ACTIVITY IS VERY DIFFICULT
LIMPING: I DO NOT HAVE THE SYMPTOM
HOW_WOULD_YOU_RATE_THE_OVERALL_FUNCTION_OF_YOUR_KNEE_DURING_YOUR_USUAL_DAILY_ACTIVITIES?: NEARLY NORMAL
SWELLING: I DO NOT HAVE THE SYMPTOM
WALK: ACTIVITY IS NOT DIFFICULT
SQUAT: ACTIVITY IS VERY DIFFICULT
KNEEL ON THE FRONT OF YOUR KNEE: ACTIVITY IS FAIRLY DIFFICULT
KNEE_ACTIVITY_OF_DAILY_LIVING_SUM: 51
STIFFNESS: I DO NOT HAVE THE SYMPTOM
HOW_WOULD_YOU_RATE_THE_CURRENT_FUNCTION_OF_YOUR_KNEE_DURING_YOUR_USUAL_DAILY_ACTIVITIES_ON_A_SCALE_FROM_0_TO_100_WITH_100_BEING_YOUR_LEVEL_OF_KNEE_FUNCTION_PRIOR_TO_YOUR_INJURY_AND_0_BEING_THE_INABILITY_TO_PERFORM_ANY_OF_YOUR_USUAL_DAILY_ACTIVITIES?: 80
GO UP STAIRS: ACTIVITY IS NOT DIFFICULT
GIVING WAY, BUCKLING OR SHIFTING OF KNEE: I DO NOT HAVE THE SYMPTOM
STAND: ACTIVITY IS MINIMALLY DIFFICULT
GO DOWN STAIRS: ACTIVITY IS NOT DIFFICULT

## 2019-11-05 NOTE — RESULT ENCOUNTER NOTE
Please send the following letter to the patient IN PATIENT'S LANGUAGE?:    Mercedes,    Your A1c is <7% which means your diabetes is under good control.  Thyroid is normal.    Please call me with any questions or concerns.          Laura Bentley PA-C

## 2019-11-11 ENCOUNTER — THERAPY VISIT (OUTPATIENT)
Dept: PHYSICAL THERAPY | Facility: CLINIC | Age: 54
End: 2019-11-11
Attending: PHYSICIAN ASSISTANT
Payer: COMMERCIAL

## 2019-11-11 DIAGNOSIS — G89.29 CHRONIC PAIN OF LEFT KNEE: ICD-10-CM

## 2019-11-11 DIAGNOSIS — M25.562 ACUTE PAIN OF LEFT KNEE: ICD-10-CM

## 2019-11-11 DIAGNOSIS — M25.562 CHRONIC PAIN OF LEFT KNEE: ICD-10-CM

## 2019-11-11 PROCEDURE — 97110 THERAPEUTIC EXERCISES: CPT | Mod: GP | Performed by: PHYSICAL THERAPIST

## 2019-11-11 PROCEDURE — 97530 THERAPEUTIC ACTIVITIES: CPT | Mod: GP | Performed by: PHYSICAL THERAPIST

## 2019-11-18 ENCOUNTER — THERAPY VISIT (OUTPATIENT)
Dept: PHYSICAL THERAPY | Facility: CLINIC | Age: 54
End: 2019-11-18
Attending: PHYSICIAN ASSISTANT
Payer: COMMERCIAL

## 2019-11-18 DIAGNOSIS — M25.562 CHRONIC PAIN OF LEFT KNEE: ICD-10-CM

## 2019-11-18 DIAGNOSIS — G89.29 CHRONIC PAIN OF LEFT KNEE: ICD-10-CM

## 2019-11-18 PROCEDURE — 97530 THERAPEUTIC ACTIVITIES: CPT | Mod: GP | Performed by: PHYSICAL THERAPIST

## 2019-11-18 PROCEDURE — 97110 THERAPEUTIC EXERCISES: CPT | Mod: GP | Performed by: PHYSICAL THERAPIST

## 2019-11-25 ENCOUNTER — THERAPY VISIT (OUTPATIENT)
Dept: PHYSICAL THERAPY | Facility: CLINIC | Age: 54
End: 2019-11-25
Attending: PHYSICIAN ASSISTANT
Payer: COMMERCIAL

## 2019-11-25 DIAGNOSIS — G89.29 CHRONIC PAIN OF LEFT KNEE: ICD-10-CM

## 2019-11-25 DIAGNOSIS — M25.562 CHRONIC PAIN OF LEFT KNEE: ICD-10-CM

## 2019-11-25 PROCEDURE — 97110 THERAPEUTIC EXERCISES: CPT | Mod: GP | Performed by: PHYSICAL THERAPIST

## 2019-11-25 PROCEDURE — 97530 THERAPEUTIC ACTIVITIES: CPT | Mod: GP | Performed by: PHYSICAL THERAPIST

## 2019-12-05 ENCOUNTER — OFFICE VISIT (OUTPATIENT)
Dept: FAMILY MEDICINE | Facility: CLINIC | Age: 54
End: 2019-12-05
Payer: COMMERCIAL

## 2019-12-05 VITALS
BODY MASS INDEX: 28.85 KG/M2 | TEMPERATURE: 97.1 F | RESPIRATION RATE: 20 BRPM | WEIGHT: 155 LBS | SYSTOLIC BLOOD PRESSURE: 122 MMHG | OXYGEN SATURATION: 97 % | DIASTOLIC BLOOD PRESSURE: 81 MMHG | HEART RATE: 81 BPM

## 2019-12-05 DIAGNOSIS — K58.0 IRRITABLE BOWEL SYNDROME WITH DIARRHEA: Primary | ICD-10-CM

## 2019-12-05 PROCEDURE — 99213 OFFICE O/P EST LOW 20 MIN: CPT | Performed by: PHYSICIAN ASSISTANT

## 2019-12-05 RX ORDER — DICYCLOMINE HCL 20 MG
20 TABLET ORAL 4 TIMES DAILY PRN
Qty: 30 TABLET | Refills: 3 | Status: SHIPPED | OUTPATIENT
Start: 2019-12-05 | End: 2021-08-09

## 2019-12-05 RX ORDER — SIMETHICONE 125 MG
125 TABLET,CHEWABLE ORAL 4 TIMES DAILY PRN
Qty: 60 TABLET | Refills: 5 | Status: SHIPPED | OUTPATIENT
Start: 2019-12-05 | End: 2021-08-30

## 2019-12-05 NOTE — PROGRESS NOTES
Subjective     Mercedes Long is a 54 year old female who presents to clinic today for the following health issues:    HPI   Abdominal Pain      Duration: x2wks    Description (location/character/radiation): mid abdominal        Associated flank pain: None    Intensity:  mild    Accompanying signs and symptoms:        Fever/Chills: no        Gas/Bloating: YES- both       Nausea/vomitting: no        Diarrhea: YES       Dysuria or Hematuria: no     History (previous similar pain/trauma/previous testing): no    Precipitating or alleviating factors:       Pain worse with eating/BM/urination: No       Pain relieved by BM: no     Therapies tried and outcome: None    LMP:  not applicable    Abdominal cramping followed by diarrhea  Occurring after eating foods outside her normal diet         Patient Active Problem List   Diagnosis     Hypertension, goal below 140/90     Hyperlipidemia LDL goal <100     GERD (gastroesophageal reflux disease)     Seborrheic dermatitis     Type 2 diabetes mellitus with diabetic polyneuropathy, without long-term current use of insulin (H)     Chronic pain of left knee     Past Surgical History:   Procedure Laterality Date     APPENDECTOMY       CARPAL TUNNEL RELEASE RT/LT Right      COLONOSCOPY WITH CO2 INSUFFLATION N/A 2016    Procedure: COLONOSCOPY WITH CO2 INSUFFLATION;  Surgeon: Duane, William Charles, MD;  Location:  OR       Social History     Tobacco Use     Smoking status: Never Smoker     Smokeless tobacco: Never Used   Substance Use Topics     Alcohol use: No     Alcohol/week: 0.0 standard drinks     Family History   Problem Relation Age of Onset     Diabetes Mother      Hyperlipidemia Mother      Hypertension Mother      Hypertension Sister      Hypertension Brother      Hyperlipidemia Brother      Cerebrovascular Disease Brother      Breast Cancer Maternal Aunt         in her 40s,      Colon Cancer No family hx of            Reviewed and updated as needed this visit by  "Provider         Review of Systems   ROS COMP: Constitutional, gi and gu systems are negative, except as otherwise noted.      Objective    /81   Pulse 81   Temp 97.1  F (36.2  C) (Tympanic)   Resp 20   Wt 70.3 kg (155 lb)   SpO2 97%   BMI 28.85 kg/m    Body mass index is 28.85 kg/m .  Physical Exam   GENERAL: healthy, alert and no distress  MS: no gross musculoskeletal defects noted, no edema  SKIN: no suspicious lesions or rashes  NEURO: Normal strength and tone, mentation intact and speech normal  PSYCH: mentation appears normal, affect normal/bright        Assessment & Plan   Assessment  1. Irritable bowel syndrome with diarrhea         Plan  Bentyl and Gas-X PRN.     Patient Instructions   Use the simethicone for bloating and gas. Use when you're bloated.  Use the dicyclomine/Bentyl for abdominal cramping/pain. You can use before meals to prevent pain.    Senna and Bisacodyl are stimulants - they'll help move things through  Tr[employer]inals Smooth Move Senna tea - can combine with honey or Truvia   Bisacodyl (Dulcolax): 5-10mg daily for maintenance; 10-20mg when constipated   Try a more regular dosing of a stimulant into your daily/weekly regimen     Incorporate these into your diet: Flax seed, apples, nuts, at least 60 ounces of water per day       BMI:   Estimated body mass index is 28.85 kg/m  as calculated from the following:    Height as of 11/1/19: 1.561 m (5' 1.46\").    Weight as of this encounter: 70.3 kg (155 lb).       Return in about 2 weeks (around 12/19/2019), or if symptoms worsen or fail to improve.    Laura Bentley PA-C  [facility] [place]          "

## 2019-12-05 NOTE — PATIENT INSTRUCTIONS
Use the simethicone for bloating and gas. Use when you're bloated.  Use the dicyclomine/Bentyl for abdominal cramping/pain. You can use before meals to prevent pain.    Senna and Bisacodyl are stimulants - they'll help move things through  Traditional Medicinals Smooth Move Senna tea - can combine with honey or Truvia   Bisacodyl (Dulcolax): 5-10mg daily for maintenance; 10-20mg when constipated   Try a more regular dosing of a stimulant into your daily/weekly regimen     Incorporate these into your diet: Flax seed, apples, nuts, at least 60 ounces of water per day

## 2020-01-24 PROBLEM — M25.562 CHRONIC PAIN OF LEFT KNEE: Status: RESOLVED | Noted: 2019-11-04 | Resolved: 2020-01-24

## 2020-01-24 PROBLEM — G89.29 CHRONIC PAIN OF LEFT KNEE: Status: RESOLVED | Noted: 2019-11-04 | Resolved: 2020-01-24

## 2020-01-24 NOTE — PROGRESS NOTES
Discharge Note    Progress reporting period is from initial evaluation date Nov 4, 2019 to Nov 25, 2019.    Please see information below for last relevant information on current status.  Patient was seen for 4 visits.    SUBJECTIVE  At last scheduled visit, patient was reporting mild improvement in her left knee pain.  Pain was a little less with squatting.  She stated she had not been doing her exercises as much, however, because of pain in her left calf which apparently has been present for a long time.  Calf pain seemed to bother her when standing at work.  Patient noted that her calf would also cramp up on her when lying in bed at night.      Current pain level is unknown.     Previous pain level was 5/10 .   Changes in function:  Yes (See Goal flowsheet attached for changes in current functional level)  Adverse reaction to treatment or activity: None    OBJECTIVE  Left hip abductor strength increased to 4+/5.  Mild tightness left gastroc.   Lumbar Screen: AROM WNL and painfree.  Single leg squat: continued to exhibit very limited hip and trunk flexion with knee flexion < 20 deg.       ASSESSMENT/PLAN  Diagnosis: Left Knee Pain   STG/LTGs have been met or progress has been made towards goals:  Yes, please see goal flowsheet for most current information  Assessment of Progress: patient was progressing slower than expected    Self Management Plans:  HEP  [facility] continues to require the following intervention to meet STG and LTG's:  HEP.    Recommendations:  Following last visit on 11/25/19, patient failed to schedule any additional appointments.  No further information on patient's status is known. Will consequently discharge from physical therapy.      Please refer to the daily flowsheet for treatment today, total treatment time and time spent performing 1:1 timed codes.

## 2020-05-17 DIAGNOSIS — G47.00 INSOMNIA, UNSPECIFIED TYPE: ICD-10-CM

## 2020-05-17 DIAGNOSIS — E11.42 TYPE 2 DIABETES MELLITUS WITH DIABETIC POLYNEUROPATHY, WITHOUT LONG-TERM CURRENT USE OF INSULIN (H): ICD-10-CM

## 2020-05-17 DIAGNOSIS — I10 HYPERTENSION, GOAL BELOW 140/90: ICD-10-CM

## 2020-05-17 DIAGNOSIS — E78.5 HYPERLIPIDEMIA LDL GOAL <130: ICD-10-CM

## 2020-05-18 DIAGNOSIS — G47.00 INSOMNIA, UNSPECIFIED TYPE: ICD-10-CM

## 2020-05-19 RX ORDER — LISINOPRIL 2.5 MG/1
2.5 TABLET ORAL DAILY
Qty: 30 TABLET | Refills: 0 | Status: SHIPPED | OUTPATIENT
Start: 2020-05-19 | End: 2020-06-15

## 2020-05-19 RX ORDER — AMLODIPINE BESYLATE 10 MG/1
10 TABLET ORAL DAILY
Qty: 30 TABLET | Refills: 0 | Status: SHIPPED | OUTPATIENT
Start: 2020-05-19 | End: 2020-06-15

## 2020-05-19 RX ORDER — SIMVASTATIN 20 MG
20 TABLET ORAL AT BEDTIME
Qty: 30 TABLET | Refills: 0 | Status: SHIPPED | OUTPATIENT
Start: 2020-05-19 | End: 2020-06-15

## 2020-05-19 NOTE — TELEPHONE ENCOUNTER
Refill x 1 month completed. Please help patient schedule a Tele med check visit with Laura within a month. Thanks.

## 2020-05-19 NOTE — TELEPHONE ENCOUNTER
Routing refill request to provider for review/approval because:  Labs not current:  LDL, Creatinine, Potassium    Last OV with Laura: 12/5/2019    Med pended for #30 with reminder due for appt, please advise.    Charleen Keenan, RN, BSN

## 2020-06-13 DIAGNOSIS — G47.00 INSOMNIA, UNSPECIFIED TYPE: ICD-10-CM

## 2020-06-13 DIAGNOSIS — K21.9 GASTROESOPHAGEAL REFLUX DISEASE WITHOUT ESOPHAGITIS: ICD-10-CM

## 2020-06-13 DIAGNOSIS — E78.5 HYPERLIPIDEMIA LDL GOAL <130: ICD-10-CM

## 2020-06-13 DIAGNOSIS — I10 HYPERTENSION, GOAL BELOW 140/90: ICD-10-CM

## 2020-06-13 DIAGNOSIS — E11.42 TYPE 2 DIABETES MELLITUS WITH DIABETIC POLYNEUROPATHY, WITHOUT LONG-TERM CURRENT USE OF INSULIN (H): ICD-10-CM

## 2020-06-15 NOTE — TELEPHONE ENCOUNTER
"Routing refill request to provider for review/approval because:  Labs not current:  Cr, ldl  Patient needs to be seen because:  Due for labs and routine visit.     Medication pended for approval, 30 day supply with reminder.                 Requested Prescriptions   Pending Prescriptions Disp Refills     simvastatin (ZOCOR) 20 MG tablet [Pharmacy Med Name: SIMVASTATIN 20MG TABLETS] 30 tablet 0     Sig: TAKE 1 TABLET(20 MG) BY MOUTH AT BEDTIME       Statins Protocol Failed - 6/13/2020 12:49 PM        Failed - LDL on file in past 12 months     Recent Labs   Lab Test 05/13/19  0935   LDL 82             Passed - No abnormal creatine kinase in past 12 months     No lab results found.             Passed - Recent (12 mo) or future (30 days) visit within the authorizing provider's specialty     Patient has had an office visit with the authorizing provider or a provider within the authorizing providers department within the previous 12 mos or has a future within next 30 days. See \"Patient Info\" tab in inbasket, or \"Choose Columns\" in Meds & Orders section of the refill encounter.              Passed - Medication is active on med list        Passed - Patient is age 18 or older        Passed - No active pregnancy on record        Passed - No positive pregnancy test in past 12 months           amLODIPine (NORVASC) 10 MG tablet [Pharmacy Med Name: AMLODIPINE BESYLATE 10MG TABLETS] 30 tablet 0     Sig: TAKE 1 TABLET(10 MG) BY MOUTH DAILY       Calcium Channel Blockers Protocol  Failed - 6/13/2020 12:49 PM        Failed - Normal serum creatinine on file in past 12 months     Recent Labs   Lab Test 05/13/19  0935   CR 0.52       Ok to refill medication if creatinine is low          Passed - Blood pressure under 140/90 in past 12 months     BP Readings from Last 3 Encounters:   12/05/19 122/81   11/01/19 129/83   05/22/19 128/82                 Passed - Recent (12 mo) or future (30 days) visit within the authorizing provider's " "specialty     Patient has had an office visit with the authorizing provider or a provider within the authorizing providers department within the previous 12 mos or has a future within next 30 days. See \"Patient Info\" tab in inbasket, or \"Choose Columns\" in Meds & Orders section of the refill encounter.              Passed - Medication is active on med list        Passed - Patient is age 18 or older        Passed - No active pregnancy on record        Passed - No positive pregnancy test in past 12 months             "

## 2020-06-15 NOTE — TELEPHONE ENCOUNTER
"Routing refill request to provider for review/approval because:  Labs not current:  K+, cr  Patient needs to be seen because:  Pt due for labs and routine visit.     Medication pended for approval, 30 day supply with reminder.                 Requested Prescriptions   Pending Prescriptions Disp Refills     amitriptyline (ELAVIL) 25 MG tablet [Pharmacy Med Name: AMITRIPTYLINE 25MG TABLETS] 90 tablet      Sig: TAKE 1 TABLET(25 MG) BY MOUTH AT BEDTIME       Tricyclic Agents ( Annual appt and no PHQ9) Passed - 6/13/2020 12:59 PM        Passed - Blood Pressure under 140/90 in past 12 mos     BP Readings from Last 3 Encounters:   12/05/19 122/81   11/01/19 129/83   05/22/19 128/82                 Passed - Recent (12 mo) or future (30 days) visit within authorizing provider's specialty     Patient has had an office visit with the authorizing provider or a provider within the authorizing providers department within the previous 12 mos or has a future within next 30 days. See \"Patient Info\" tab in inbasket, or \"Choose Columns\" in Meds & Orders section of the refill encounter.              Passed - Medication is active on med list        Passed - Patient is age 18 or older        Passed - Patient is not pregnant        Passed - No positive pregnancy test on record in past 12 mos           lisinopril (ZESTRIL) 2.5 MG tablet [Pharmacy Med Name: LISINOPRIL 2.5MG TABLETS] 90 tablet      Sig: TAKE 1 TABLET(2.5 MG) BY MOUTH DAILY       ACE Inhibitors (Including Combos) Protocol Failed - 6/13/2020 12:59 PM        Failed - Normal serum creatinine on file in past 12 months     Recent Labs   Lab Test 05/13/19  0935   CR 0.52       Ok to refill medication if creatinine is low          Failed - Normal serum potassium on file in past 12 months     Recent Labs   Lab Test 05/13/19  0935   POTASSIUM 3.7             Passed - Blood pressure under 140/90 in past 12 months     BP Readings from Last 3 Encounters:   12/05/19 122/81   11/01/19 " "129/83   05/22/19 128/82                 Passed - Recent (12 mo) or future (30 days) visit within the authorizing provider's specialty     Patient has had an office visit with the authorizing provider or a provider within the authorizing providers department within the previous 12 mos or has a future within next 30 days. See \"Patient Info\" tab in inZazomsket, or \"Choose Columns\" in Meds & Orders section of the refill encounter.              Passed - Medication is active on med list        Passed - Patient is age 18 or older        Passed - No active pregnancy on record        Passed - No positive pregnancy test within past 12 months           omeprazole (PRILOSEC) 20 MG DR capsule [Pharmacy Med Name: OMEPRAZOLE 20MG CAPSULES] 90 capsule 3     Sig: TAKE 1 CAPSULE(20 MG) BY MOUTH DAILY       PPI Protocol Passed - 6/13/2020 12:59 PM        Passed - Not on Clopidogrel (unless Pantoprazole ordered)        Passed - No diagnosis of osteoporosis on record        Passed - Recent (12 mo) or future (30 days) visit within the authorizing provider's specialty     Patient has had an office visit with the authorizing provider or a provider within the authorizing providers department within the previous 12 mos or has a future within next 30 days. See \"Patient Info\" tab in inScope 5et, or \"Choose Columns\" in Meds & Orders section of the refill encounter.              Passed - Medication is active on med list        Passed - Patient is age 18 or older        Passed - No active pregnacy on record        Passed - No positive pregnancy test in past 12 months             "

## 2020-06-16 DIAGNOSIS — E11.42 TYPE 2 DIABETES MELLITUS WITH DIABETIC POLYNEUROPATHY, WITHOUT LONG-TERM CURRENT USE OF INSULIN (H): ICD-10-CM

## 2020-06-16 DIAGNOSIS — G47.00 INSOMNIA, UNSPECIFIED TYPE: ICD-10-CM

## 2020-06-16 RX ORDER — LISINOPRIL 2.5 MG/1
TABLET ORAL
Qty: 30 TABLET | Refills: 0 | Status: SHIPPED | OUTPATIENT
Start: 2020-06-16 | End: 2020-07-10

## 2020-06-16 RX ORDER — SIMVASTATIN 20 MG
TABLET ORAL
Qty: 30 TABLET | Refills: 0 | Status: SHIPPED | OUTPATIENT
Start: 2020-06-16 | End: 2020-07-10

## 2020-06-16 RX ORDER — AMLODIPINE BESYLATE 10 MG/1
TABLET ORAL
Qty: 30 TABLET | Refills: 0 | Status: SHIPPED | OUTPATIENT
Start: 2020-06-16 | End: 2020-07-10

## 2020-06-18 RX ORDER — LISINOPRIL 2.5 MG/1
TABLET ORAL
Qty: 30 TABLET | Refills: 0 | OUTPATIENT
Start: 2020-06-18

## 2020-06-22 ENCOUNTER — OFFICE VISIT (OUTPATIENT)
Dept: FAMILY MEDICINE | Facility: CLINIC | Age: 55
End: 2020-06-22
Payer: COMMERCIAL

## 2020-06-22 VITALS
BODY MASS INDEX: 29 KG/M2 | SYSTOLIC BLOOD PRESSURE: 122 MMHG | TEMPERATURE: 97.3 F | WEIGHT: 153.6 LBS | OXYGEN SATURATION: 98 % | DIASTOLIC BLOOD PRESSURE: 80 MMHG | RESPIRATION RATE: 16 BRPM | HEIGHT: 61 IN | HEART RATE: 78 BPM

## 2020-06-22 DIAGNOSIS — E78.5 HYPERLIPIDEMIA LDL GOAL <100: ICD-10-CM

## 2020-06-22 DIAGNOSIS — R25.2 MUSCLE CRAMP, NOCTURNAL: Primary | ICD-10-CM

## 2020-06-22 DIAGNOSIS — I10 HYPERTENSION, GOAL BELOW 140/90: ICD-10-CM

## 2020-06-22 DIAGNOSIS — E11.42 TYPE 2 DIABETES MELLITUS WITH DIABETIC POLYNEUROPATHY, WITHOUT LONG-TERM CURRENT USE OF INSULIN (H): ICD-10-CM

## 2020-06-22 DIAGNOSIS — Z12.31 ENCOUNTER FOR SCREENING MAMMOGRAM FOR BREAST CANCER: ICD-10-CM

## 2020-06-22 LAB
ALBUMIN SERPL-MCNC: 3.8 G/DL (ref 3.4–5)
ALP SERPL-CCNC: 153 U/L (ref 40–150)
ALT SERPL W P-5'-P-CCNC: 55 U/L (ref 0–50)
ANION GAP SERPL CALCULATED.3IONS-SCNC: 3 MMOL/L (ref 3–14)
AST SERPL W P-5'-P-CCNC: 28 U/L (ref 0–45)
BILIRUB SERPL-MCNC: 0.4 MG/DL (ref 0.2–1.3)
BUN SERPL-MCNC: 12 MG/DL (ref 7–30)
CALCIUM SERPL-MCNC: 8.7 MG/DL (ref 8.5–10.1)
CHLORIDE SERPL-SCNC: 105 MMOL/L (ref 94–109)
CHOLEST SERPL-MCNC: 176 MG/DL
CO2 SERPL-SCNC: 30 MMOL/L (ref 20–32)
CREAT SERPL-MCNC: 0.53 MG/DL (ref 0.52–1.04)
CREAT UR-MCNC: 32 MG/DL
GFR SERPL CREATININE-BSD FRML MDRD: >90 ML/MIN/{1.73_M2}
GLUCOSE SERPL-MCNC: 139 MG/DL (ref 70–99)
HBA1C MFR BLD: 7.2 % (ref 0–5.6)
HDLC SERPL-MCNC: 77 MG/DL
LDLC SERPL CALC-MCNC: 71 MG/DL
MICROALBUMIN UR-MCNC: <5 MG/L
MICROALBUMIN/CREAT UR: NORMAL MG/G CR (ref 0–25)
NONHDLC SERPL-MCNC: 99 MG/DL
POTASSIUM SERPL-SCNC: 4.2 MMOL/L (ref 3.4–5.3)
PROT SERPL-MCNC: 7.8 G/DL (ref 6.8–8.8)
SODIUM SERPL-SCNC: 138 MMOL/L (ref 133–144)
TRIGL SERPL-MCNC: 141 MG/DL

## 2020-06-22 PROCEDURE — 36415 COLL VENOUS BLD VENIPUNCTURE: CPT | Performed by: PHYSICIAN ASSISTANT

## 2020-06-22 PROCEDURE — 82043 UR ALBUMIN QUANTITATIVE: CPT | Performed by: PHYSICIAN ASSISTANT

## 2020-06-22 PROCEDURE — 99214 OFFICE O/P EST MOD 30 MIN: CPT | Performed by: PHYSICIAN ASSISTANT

## 2020-06-22 PROCEDURE — 80053 COMPREHEN METABOLIC PANEL: CPT | Performed by: PHYSICIAN ASSISTANT

## 2020-06-22 PROCEDURE — 83036 HEMOGLOBIN GLYCOSYLATED A1C: CPT | Performed by: PHYSICIAN ASSISTANT

## 2020-06-22 PROCEDURE — 99207 C FOOT EXAM  NO CHARGE: CPT | Performed by: PHYSICIAN ASSISTANT

## 2020-06-22 PROCEDURE — 80061 LIPID PANEL: CPT | Performed by: PHYSICIAN ASSISTANT

## 2020-06-22 ASSESSMENT — MIFFLIN-ST. JEOR: SCORE: 1229.49

## 2020-06-22 NOTE — LETTER
June 24, 2020      Mercedes Long  [address]  [place] [address]        Dear ,    We are writing to inform you of your test results.    Your A1c has increased some; however, your diabetes is still fairly well controlled. Continue to monitor your carb/sugar intake. I'd like to recheck your A1c in 4 months.   Your liver enzymes are very slightly elevated which I believe to be related to your diet. A diet high in fat or excess alcohol use can cause elevated liver enzymes.   Cholesterol is at goal.   Continue your medications without changes.     Resulted Orders   Albumin Random Urine Quantitative with Creat Ratio   Result Value Ref Range    Creatinine Urine 32 mg/dL    Albumin Urine mg/L <5 mg/L    Albumin Urine mg/g Cr Unable to calculate due to low value 0 - 25 mg/g Cr   HEMOGLOBIN A1C   Result Value Ref Range    Hemoglobin A1C 7.2 (H) 0 - 5.6 %      Comment:      Normal <5.7% Prediabetes 5.7-6.4%  Diabetes 6.5% or higher - adopted from ADA   consensus guidelines.     Lipid panel reflex to direct LDL Fasting   Result Value Ref Range    Cholesterol 176 <200 mg/dL    Triglycerides 141 <150 mg/dL      Comment:      Fasting specimen    HDL Cholesterol 77 >49 mg/dL    LDL Cholesterol Calculated 71 <100 mg/dL      Comment:      Desirable:       <100 mg/dl    Non HDL Cholesterol 99 <130 mg/dL   Comprehensive metabolic panel   Result Value Ref Range    Sodium 138 133 - 144 mmol/L    Potassium 4.2 3.4 - 5.3 mmol/L    Chloride 105 94 - 109 mmol/L    Carbon Dioxide 30 20 - 32 mmol/L    Anion Gap 3 3 - 14 mmol/L    Glucose 139 (H) 70 - 99 mg/dL      Comment:      Fasting specimen    Urea Nitrogen 12 7 - 30 mg/dL    Creatinine 0.53 0.52 - 1.04 mg/dL    GFR Estimate >90 >60 mL/min/[1.73_m2]      Comment:      Non  GFR Calc  Starting 12/18/2018, serum creatinine based estimated GFR (eGFR) will be   calculated using the Chronic Kidney Disease Epidemiology Collaboration   (CKD-EPI) equation.      GFR  Estimate If Black >90 >60 mL/min/[1.73_m2]      Comment:       GFR Calc  Starting 12/18/2018, serum creatinine based estimated GFR (eGFR) will be   calculated using the Chronic Kidney Disease Epidemiology Collaboration   (CKD-EPI) equation.      Calcium 8.7 8.5 - 10.1 mg/dL    Bilirubin Total 0.4 0.2 - 1.3 mg/dL    Albumin 3.8 3.4 - 5.0 g/dL    Protein Total 7.8 6.8 - 8.8 g/dL    Alkaline Phosphatase 153 (H) 40 - 150 U/L    ALT 55 (H) 0 - 50 U/L    AST 28 0 - 45 U/L       If you have any questions or concerns, please call the clinic at the number listed above.       Sincerely,        Laura Bentley PA-C/sawo

## 2020-06-22 NOTE — PATIENT INSTRUCTIONS
Try to drink at least 60 ounces of water per day, 90 if possible.   A 10 minute walk in the evening can help prevent muscle cramps.     Start a Multivitamin once daily. Any brand works well. I like to take V[employer]Fusion vitamins.

## 2020-06-22 NOTE — PROGRESS NOTES
Subjective     Mercedes Long is a 54 year old female who presents to clinic today for the following health issues:    HPI   Cramping sensation follow-up      Duration: 2 years    Description (location/character/radiation): both legs    Intensity:  moderate    Accompanying signs and symptoms: swelling, numbness    History (similar episodes/previous evaluation): in the past, once in a while    Precipitating or alleviating factors: standing makes it worse; special socks prescribed by her family doctor makes it better    Therapies tried and outcome: stretching exercises at home    Occurs mostly at night  Muscle tightness in the calves  Would like a new prescription for compression stockings, thigh high         Patient Active Problem List   Diagnosis     Hypertension, goal below 140/90     Hyperlipidemia LDL goal <100     GERD (gastroesophageal reflux disease)     Seborrheic dermatitis     Type 2 diabetes mellitus with diabetic polyneuropathy, without long-term current use of insulin (H)     Past Surgical History:   Procedure Laterality Date     APPENDECTOMY       CARPAL TUNNEL RELEASE RT/LT Right      COLONOSCOPY WITH CO2 INSUFFLATION N/A 2016    Procedure: COLONOSCOPY WITH CO2 INSUFFLATION;  Surgeon: Duane, William Charles, MD;  Location:  OR       Social History     Tobacco Use     Smoking status: Never Smoker     Smokeless tobacco: Never Used   Substance Use Topics     Alcohol use: No     Alcohol/week: 0.0 standard drinks     Family History   Problem Relation Age of Onset     Diabetes Mother      Hyperlipidemia Mother      Hypertension Mother      Hypertension Sister      Hypertension Brother      Hyperlipidemia Brother      Cerebrovascular Disease Brother      Breast Cancer Maternal Aunt         in her 40s,      Colon Cancer No family hx of            Reviewed and updated as needed this visit by Provider         Review of Systems   Constitutional, musculoskeletal, neuro, skin systems are negative,  "except as otherwise noted.      Objective    There were no vitals taken for this visit.  There is no height or weight on file to calculate BMI.  Physical Exam   GENERAL: healthy, alert and no distress  MS: no gross musculoskeletal defects noted, no edema  SKIN: no suspicious lesions or rashes  NEURO: Normal strength and tone, mentation intact and speech normal  PSYCH: mentation appears normal, affect normal/bright  Diabetic foot exam: normal DP and PT pulses, no trophic changes or ulcerative lesions, normal sensory exam and normal monofilament exam        Assessment & Plan   Assessment  1. Muscle cramp, nocturnal    2. Type 2 diabetes mellitus with diabetic polyneuropathy, without long-term current use of insulin (H)    3. Hyperlipidemia LDL goal <100    4. Hypertension, goal below 140/90    5. Encounter for screening mammogram for breast cancer         Plan  1) Discussed common causes of muscle cramping. Will check electrolytes. Recommended adequate water intake and walking in the evening.     2-4) Routine labs today, follow up in 1 week to review results and refill meds.       BMI:   Estimated body mass index is 28.7 kg/m  as calculated from the following:    Height as of this encounter: 1.558 m (5' 1.34\").    Weight as of this encounter: 69.7 kg (153 lb 9.6 oz).         Return in about 1 week (around 6/29/2020) for a med check, med renewal.    Laura Bentley PA-C  [facility] [place]                "

## 2020-06-24 NOTE — RESULT ENCOUNTER NOTE
Please send the following letter to the patient IN [place]:    Mercedes,    Your A1c has increased some; however, your diabetes is still fairly well controlled. Continue to monitor your carb/sugar intake. I'd like to recheck your A1c in 4 months.   Your liver enzymes are very slightly elevated which I believe to be related to your diet. A diet high in fat or excess alcohol use can cause elevated liver enzymes.   Cholesterol is at goal.  Continue your medications without changes.    Please call me with any questions or concerns.          Laura Bentley PA-C

## 2020-07-07 ENCOUNTER — VIRTUAL VISIT (OUTPATIENT)
Dept: FAMILY MEDICINE | Facility: CLINIC | Age: 55
End: 2020-07-07
Payer: COMMERCIAL

## 2020-07-07 DIAGNOSIS — E11.42 TYPE 2 DIABETES MELLITUS WITH DIABETIC POLYNEUROPATHY, WITHOUT LONG-TERM CURRENT USE OF INSULIN (H): Primary | ICD-10-CM

## 2020-07-07 DIAGNOSIS — R79.89 ELEVATED LFTS: ICD-10-CM

## 2020-07-07 DIAGNOSIS — R06.83 SNORING: ICD-10-CM

## 2020-07-07 PROCEDURE — 99214 OFFICE O/P EST MOD 30 MIN: CPT | Mod: 95 | Performed by: PHYSICIAN ASSISTANT

## 2020-07-07 RX ORDER — BLOOD SUGAR DIAGNOSTIC
STRIP MISCELLANEOUS
Qty: 100 EACH | Refills: 5 | Status: SHIPPED | OUTPATIENT
Start: 2020-07-07

## 2020-07-07 NOTE — PROGRESS NOTES
"Mercedes Long is a 54 year old female who is being evaluated via a billable telephone visit.      The patient has been notified of following:     \"This telephone visit will be conducted via a call between you and your physician/provider. We have found that certain health care needs can be provided without the need for a physical exam.  This service lets us provide the care you need with a short phone conversation.  If a prescription is necessary we can send it directly to your pharmacy.  If lab work is needed we can place an order for that and you can then stop by our lab to have the test done at a later time.    Telephone visits are billed at different rates depending on your insurance coverage. During this emergency period, for some insurers they may be billed the same as an in-person visit.  Please reach out to your insurance provider with any questions.    If during the course of the call the physician/provider feels a telephone visit is not appropriate, you will not be charged for this service.\"    Patient has given verbal consent for Telephone visit?  Yes    What phone number would you like to be contacted at? [phone]    How would you like to obtain your AVS? Mail a copy    Subjective     Mercedes Long is a 54 year old female who presents via phone visit today for the following health issues:    HPI  Diabetes Follow-up      How often are you checking your blood sugar? Not at all    What concerns do you have today about your diabetes? None     Do you have any of these symptoms? (Select all that apply)  Numbness in feet        Hyperlipidemia Follow-Up      Are you regularly taking any medication or supplement to lower your cholesterol?   Yes- Simvastatin    Are you having muscle aches or other side effects that you think could be caused by your cholesterol lowering medication?  Yes- muscle aches- calf    Hypertension Follow-up      Do you check your blood pressure regularly outside of the clinic? Yes "     Are you following a low salt diet? Yes    Are your blood pressures ever more than 140 on the top number (systolic) OR more   than 90 on the bottom number (diastolic), for example 140/90? Not sure    BP Readings from Last 2 Encounters:   06/22/20 122/80   12/05/19 122/81     Hemoglobin A1C (%)   Date Value   06/22/2020 7.2 (H)   11/01/2019 6.6 (H)     LDL Cholesterol Calculated (mg/dL)   Date Value   06/22/2020 71   05/13/2019 82         How many servings of fruits and vegetables do you eat daily?  2-3    On average, how many sweetened beverages do you drink each day (Examples: soda, juice, sweet tea, etc.  Do NOT count diet or artificially sweetened beverages)?   1    How many days per week do you exercise enough to make your heart beat faster? 5    How many minutes a day do you exercise enough to make your heart beat faster? 60 or more    How many days per week do you miss taking your medication? 0        PROBLEMS TO ADD ON...  Making noise when sleeping - wakes herself up at night       Patient Active Problem List   Diagnosis     Hypertension, goal below 140/90     Hyperlipidemia LDL goal <100     GERD (gastroesophageal reflux disease)     Seborrheic dermatitis     Type 2 diabetes mellitus with diabetic polyneuropathy, without long-term current use of insulin (H)     Past Surgical History:   Procedure Laterality Date     APPENDECTOMY       CARPAL TUNNEL RELEASE RT/LT Right      COLONOSCOPY WITH CO2 INSUFFLATION N/A 5/18/2016    Procedure: COLONOSCOPY WITH CO2 INSUFFLATION;  Surgeon: Duane, William Charles, MD;  Location:  OR       Social History     Tobacco Use     Smoking status: Never Smoker     Smokeless tobacco: Never Used   Substance Use Topics     Alcohol use: No     Alcohol/week: 0.0 standard drinks     Family History   Problem Relation Age of Onset     Diabetes Mother      Hyperlipidemia Mother      Hypertension Mother      Hypertension Sister      Hypertension Brother      Hyperlipidemia Brother       Cerebrovascular Disease Brother      Breast Cancer Maternal Aunt         in her 40s,      Colon Cancer No family hx of            Reviewed and updated as needed this visit by Provider         Review of Systems   Constitutional, endocrine, GI systems are negative, except as otherwise noted.       Objective   Reported vitals:  There were no vitals taken for this visit.   healthy, alert and no distress  PSYCH: Alert and oriented times 3; coherent speech, normal   rate and volume, able to articulate logical thoughts, able   to abstract reason, no tangential thoughts, no hallucinations   or delusions  Her affect is normal and pleasant  RESP: No cough, no audible wheezing, able to talk in full sentences  Remainder of exam unable to be completed due to telephone visits        Assessment/Plan:  1. Type 2 diabetes mellitus with diabetic polyneuropathy, without long-term current use of insulin (H)    2. Elevated LFTs    3. Snoring         1,2) Discussed recent lab results. I'd like her to work on her diet - incorporating more low-fat options and decreasing her carb intake. Will follow up with an OV/labs (A1c and LFTs) in 4 months to reassess.     3) Referral for a sleep study.      Return in about 4 months (around 2020) for a med check, repeat labs.      Phone call duration:  13 minutes    Laura Bentley PA-C

## 2020-07-10 DIAGNOSIS — E78.5 HYPERLIPIDEMIA LDL GOAL <130: ICD-10-CM

## 2020-07-10 DIAGNOSIS — I10 HYPERTENSION, GOAL BELOW 140/90: ICD-10-CM

## 2020-07-10 DIAGNOSIS — G47.00 INSOMNIA, UNSPECIFIED TYPE: ICD-10-CM

## 2020-07-10 DIAGNOSIS — K21.9 GASTROESOPHAGEAL REFLUX DISEASE WITHOUT ESOPHAGITIS: ICD-10-CM

## 2020-07-10 DIAGNOSIS — E11.42 TYPE 2 DIABETES MELLITUS WITH DIABETIC POLYNEUROPATHY, WITHOUT LONG-TERM CURRENT USE OF INSULIN (H): ICD-10-CM

## 2020-07-10 RX ORDER — LISINOPRIL 2.5 MG/1
TABLET ORAL
Qty: 90 TABLET | Refills: 0 | Status: SHIPPED | OUTPATIENT
Start: 2020-07-10 | End: 2020-10-08

## 2020-07-10 RX ORDER — SIMVASTATIN 20 MG
TABLET ORAL
Qty: 90 TABLET | Refills: 0 | Status: SHIPPED | OUTPATIENT
Start: 2020-07-10 | End: 2020-10-08

## 2020-07-10 RX ORDER — AMLODIPINE BESYLATE 10 MG/1
TABLET ORAL
Qty: 90 TABLET | Refills: 0 | Status: SHIPPED | OUTPATIENT
Start: 2020-07-10 | End: 2020-10-08

## 2020-07-11 NOTE — TELEPHONE ENCOUNTER
Prescription approved per [facility] Refill Protocol.  Nimisha Sousa RN

## 2020-07-17 ENCOUNTER — APPOINTMENT (OUTPATIENT)
Dept: INTERPRETER SERVICES | Facility: CLINIC | Age: 55
End: 2020-07-17
Payer: COMMERCIAL

## 2020-07-17 ASSESSMENT — MIFFLIN-ST. JEOR: SCORE: 1226.14

## 2020-07-17 NOTE — PROGRESS NOTES
"Mercedes Long is a 54 year old female who is being evaluated via a billable telephone visit.      The patient has been notified of following:     \"This telephone visit will be conducted via a call between you and your physician/provider. We have found that certain health care needs can be provided without the need for a physical exam.  This service lets us provide the care you need with a short phone conversation.  If a prescription is necessary we can send it directly to your pharmacy.  If lab work is needed we can place an order for that and you can then stop by our lab to have the test done at a later time.    Telephone visits are billed at different rates depending on your insurance coverage. During this emergency period, for some insurers they may be billed the same as an in-person visit.  Please reach out to your insurance provider with any questions.    If during the course of the call the physician/provider feels a telephone visit is not appropriate, you will not be charged for this service.\"    Patient has given verbal consent for Telephone visit?  Yes    What phone number would you like to be contacted at? [phone]    How would you like to obtain your AVS? Mail a copy    Phone call duration: 25 minutes      Sleep Consultation:    Date on this visit: 7/20/2020      Primary Physician: Laura Bentley     Chief Complaint   Patient presents with     Consult     SNORING, WAKES SELF UP WITH SNORING        HPI:Mercedes Long is 54 year old female with medical history remarkable for chronic insomnia, HTN, diabetes type 2, hyperlipidemia and GERD. Mercedes is sent by Laura Bentley for a sleep consultation regarding snoring.     Interview occurred via a Congolese interpretor.     Mercedes goes to sleep at 11:00 PM during the week. She wakes up at 7:30 AM with an alarm. She falls asleep in 30-60 minutes when she takes Elavil 25 mg. Without Elavil, sleep latency is 2-3 hours.   She wakes up 1-2 times a night " for 60 minutes before falling back to sleep.  Mercedes wakes up to uncertain reasons.  Patient gets an average of 6-7 hours of sleep per night. She does not feel rested.    Patient does not watch TV in bed.     Mercedes does not do shift work.      Mercedes does snore every night and snoring is loud. Patient does have a regular bed partner. There is report of gasping.  She does not have witnessed apneas. They never sleep separately.  Patient sleeps on her side. She denies no morning headaches, morning confusion and restless legs. Mercedes denies any bruxism, sleep walking, sleep talking, dream enactment, sleep paralysis, cataplexy and hypnogogic/hypnopompic hallucinations.    Mercedes denies difficulty breathing through her nose, claustrophobia and reflux at night.      Mercedes has gained 0-5 pounds in the last year.  Patient describes themself as a morning person.  She would prefer to go to sleep at 11:00 PM and wake up at 7:00 AM.  Patient's [place] Sleepiness score 4/24 inconsistent with excessive  daytime sleepiness.  She has fatigue.    Mercedes does not take naps. She takes some inadvertant naps.  She denies falling asleep while driving. Patient was counseled on the importance of driving while alert, to pull over if drowsy, or nap before getting into the vehicle if sleepy.  She uses 1 cup/day of coffee. Last caffeine intake is usually before noon.    Allergies:    No Known Allergies    Medications:    Current Outpatient Medications   Medication Sig Dispense Refill     amitriptyline (ELAVIL) 25 MG tablet TAKE 1 TABLET(25 MG) BY MOUTH AT BEDTIME 90 tablet 0     amLODIPine (NORVASC) 10 MG tablet TAKE 1 TABLET(10 MG) BY MOUTH DAILY 90 tablet 0     aspirin 81 MG tablet Take by mouth daily       blood glucose (ONETOUCH VERIO IQ) test strip Use to test blood sugars 1-2 times daily or as directed. 100 each 5     blood glucose monitoring (ONETOUCH VERIO) meter device kit Use to test blood sugars 1 times daily or as directed. 1 kit 0      diclofenac (VOLTAREN) 75 MG EC tablet Take 1 tablet (75 mg) by mouth 2 times daily as needed for moderate pain X 7 days (take with food) 30 tablet 0     dicyclomine (BENTYL) 20 MG tablet Take 1 tablet (20 mg) by mouth 4 times daily as needed (abdominal cramping) 30 tablet 3     Glucosamine Sulfate 1500 MG PACK        HYDROcodone-acetaminophen (NORCO) 5-325 MG per tablet Take 1 tablet by mouth nightly as needed for moderate to severe pain 18 tablet 0     lisinopril (ZESTRIL) 2.5 MG tablet TAKE 1 TABLET(2.5 MG) BY MOUTH DAILY 90 tablet 0     melatonin 5 MG tablet Take 1 tablet (5 mg) by mouth nightly as needed for sleep       omeprazole (PRILOSEC) 20 MG DR capsule TAKE 1 CAPSULE(20 MG) BY MOUTH DAILY 90 capsule 0     ONETOUCH DELICA LANCETS 33G MISC 1 each daily 100 each 3     simethicone (MYLICON) 125 MG chewable tablet Take 1 tablet (125 mg) by mouth 4 times daily as needed for intestinal gas 60 tablet 5     simvastatin (ZOCOR) 20 MG tablet TAKE 1 TABLET(20 MG) BY MOUTH AT BEDTIME 90 tablet 0       Problem List:  Patient Active Problem List    Diagnosis Date Noted     Chronic insomnia 07/20/2020     Priority: Medium     Type 2 diabetes mellitus with diabetic polyneuropathy, without long-term current use of insulin (H) 05/22/2019     Priority: Medium     Seborrheic dermatitis 05/10/2016     Priority: Medium     Hypertension, goal below 140/90      Priority: Medium     Hyperlipidemia LDL goal <100      Priority: Medium              GERD (gastroesophageal reflux disease)      Priority: Medium        Past Medical/Surgical History:  Past Medical History:   Diagnosis Date     Hypercholesteremia      Past Surgical History:   Procedure Laterality Date     APPENDECTOMY       CARPAL TUNNEL RELEASE RT/LT Right      COLONOSCOPY WITH CO2 INSUFFLATION N/A 5/18/2016    Procedure: COLONOSCOPY WITH CO2 INSUFFLATION;  Surgeon: Duane, William Charles, MD;  Location:  OR       Social History:  Social History     Socioeconomic History      Marital status:      Spouse name: Kathryn Morgan     Number of children: 3     Years of education: 6     Highest education level: Not on file   Occupational History     Occupation: [employer]     Comment: In [place] for last 2 years   Social Needs     Financial resource strain: Not on file     Food insecurity     Worry: Not on file     Inability: Not on file     Transportation needs     Medical: Not on file     Non-medical: Not on file   Tobacco Use     Smoking status: Never Smoker     Smokeless tobacco: Never Used   Substance and Sexual Activity     Alcohol use: No     Alcohol/week: 0.0 standard drinks     Drug use: No     Sexual activity: Yes     Partners: Male     Birth control/protection: None   Lifestyle     Physical activity     Days per week: Not on file     Minutes per session: Not on file     Stress: Not on file   Relationships     Social connections     Talks on phone: Not on file     Gets together: Not on file     Attends Amish service: Not on file     Active member of club or organization: Not on file     Attends meetings of clubs or organizations: Not on file     Relationship status: Not on file     Intimate partner violence     Fear of current or ex partner: Not on file     Emotionally abused: Not on file     Physically abused: Not on file     Forced sexual activity: Not on file   Other Topics Concern     Parent/sibling w/ CABG, MI or angioplasty before 65F 55M? Not Asked   Social History Narrative     Not on file       Family History:  Family History   Problem Relation Age of Onset     Diabetes Mother      Hyperlipidemia Mother      Hypertension Mother      Hypertension Sister      Hypertension Brother      Hyperlipidemia Brother      Cerebrovascular Disease Brother      Breast Cancer Maternal Aunt         in her 40s,      Colon Cancer No family hx of        Review of Systems:  A complete review of systems reviewed by me is negative with the exeption of what has been mentioned in the  "history of present illness.  Physical Examination:  Vitals: Ht 1.557 m (5' 1.3\")   Wt 69.4 kg (153 lb)   BMI 28.63 kg/m    BMI= Body mass index is 28.63 kg/m .         [place] Total Score 7/17/2020   Total score - [place] 4       CARTER Total Score: 12 (07/17/20 0900)      Impression:  Patient has features and risk factors for possible obstructive sleep apnea including: loud snoring, non-refreshing sleep, bruxism, daytime fatigue, difficulty maintaining sleep, and co-morbid HTN ad DM type 2. The STOP-BANG score is 4/8.  The pathophysiology, diagnosis and treatment of VINNY was discussed and a handout was provided.   Plan:    1. Recommend Polysomnogram (using 4% desaturation/Medicare/2012 AASM 1B scoring rules) to evaluate formobstructive sleep apnea. She will take her usual Elavil on the night of the sleep study.  2. Insomnia will be discussed on a future visit.   3. Advised her against drowsy driving.    4. Recommend weight management.     Literature provided regarding sleep apnea.      She will follow up with me in approximately two weeks after her sleep study has been competed to review the results and discuss plan of care.       Polysomnography reviewed.  Obstructive sleep apnea reviewed.  Complications of untreated sleep apnea were reviewed.    Antwon Almaguer PA-C    Video call and chart review duration: 40 minutes    CC: Laura Bentley        "

## 2020-07-17 NOTE — PATIENT INSTRUCTIONS

## 2020-07-19 PROBLEM — E11.42 TYPE 2 DIABETES MELLITUS WITH DIABETIC POLYNEUROPATHY, WITHOUT LONG-TERM CURRENT USE OF INSULIN (H): Chronic | Status: ACTIVE | Noted: 2019-05-22

## 2020-07-20 ENCOUNTER — VIRTUAL VISIT (OUTPATIENT)
Dept: SLEEP MEDICINE | Facility: CLINIC | Age: 55
End: 2020-07-20
Attending: PHYSICIAN ASSISTANT
Payer: COMMERCIAL

## 2020-07-20 VITALS — BODY MASS INDEX: 28.89 KG/M2 | WEIGHT: 153 LBS | HEIGHT: 61 IN

## 2020-07-20 DIAGNOSIS — R06.89 DYSPNEA AND RESPIRATORY ABNORMALITY: Primary | ICD-10-CM

## 2020-07-20 DIAGNOSIS — R53.83 MALAISE AND FATIGUE: ICD-10-CM

## 2020-07-20 DIAGNOSIS — Z72.820 LACK OF ADEQUATE SLEEP: ICD-10-CM

## 2020-07-20 DIAGNOSIS — F51.04 CHRONIC INSOMNIA: ICD-10-CM

## 2020-07-20 DIAGNOSIS — I10 ESSENTIAL HYPERTENSION: ICD-10-CM

## 2020-07-20 DIAGNOSIS — R53.81 MALAISE AND FATIGUE: ICD-10-CM

## 2020-07-20 DIAGNOSIS — R06.00 DYSPNEA AND RESPIRATORY ABNORMALITY: Primary | ICD-10-CM

## 2020-07-20 DIAGNOSIS — R06.83 SNORING: ICD-10-CM

## 2020-07-20 PROCEDURE — 99204 OFFICE O/P NEW MOD 45 MIN: CPT | Mod: 95 | Performed by: PHYSICIAN ASSISTANT

## 2020-08-11 ENCOUNTER — TRANSFERRED RECORDS (OUTPATIENT)
Dept: HEALTH INFORMATION MANAGEMENT | Facility: CLINIC | Age: 55
End: 2020-08-11

## 2020-08-20 ENCOUNTER — OFFICE VISIT (OUTPATIENT)
Dept: FAMILY MEDICINE | Facility: CLINIC | Age: 55
End: 2020-08-20
Payer: COMMERCIAL

## 2020-08-20 VITALS
HEART RATE: 87 BPM | OXYGEN SATURATION: 98 % | WEIGHT: 153.4 LBS | SYSTOLIC BLOOD PRESSURE: 111 MMHG | TEMPERATURE: 98.4 F | DIASTOLIC BLOOD PRESSURE: 76 MMHG | BODY MASS INDEX: 28.7 KG/M2 | RESPIRATION RATE: 16 BRPM

## 2020-08-20 DIAGNOSIS — R55 VASOVAGAL SYNCOPE: Primary | ICD-10-CM

## 2020-08-20 DIAGNOSIS — Z12.31 ENCOUNTER FOR SCREENING MAMMOGRAM FOR BREAST CANCER: ICD-10-CM

## 2020-08-20 PROCEDURE — 99213 OFFICE O/P EST LOW 20 MIN: CPT | Performed by: PHYSICIAN ASSISTANT

## 2020-08-20 NOTE — PROGRESS NOTES
Subjective     Mercedes Long is a 54 year old female who presents to clinic today for the following health issues:    HPI       ED/UC Followup:    Facility:  [facility]  Date of visit: 08/11/2020  Reason for visit: SOB, weak, PUI  Current Status: high B/P, tired, headaches    Noticed some tunnel vision before fainting  Palpitations/heart racing, sweaty/chills     Mercedes Long is a Samoan-speaking 54 y.o. female with a history of hypertension who presents to the emergency department for evaluation of of headache, generalized shortness of breath, as well as generalized weakness. She reports that on Saturday she was feeling fine and suddenly had an onset of dizziness. Her vision went dark and she felt very weak and passed out. She was with a family member who was able to support her, and therefore she did not fall or hit her head. Since then, she has had a headache on the right side of her head with associated neck pain on the right side. She's still feeling generally weak and short of breath. She denies fevers or chills. She denies vision or hearing changes, other than when she passed out. She denies taste or smell changes. She denies trauma. She denies chest pain or cough. She denies nausea, vomiting, diarrhea, constipation, abdominal pain. No back pain, rashes, numbness/tingling, loss of control of bowels or bladder, dysuria, swelling other than occasionally at the end of the day her ankles are slightly swollen. She denies any recent medication changes.     IMAGING:  XR Chest 1 View Portable:  IMPRESSION: Low lung volumes. Cardiac and mediastinal silhouettes unremarkable.   Mild elevation of the right hemidiaphragm with right basilar atelectasis or   scarring. No pleural effusion, pneumothorax or focal consolidation.     Report per radiology.     CT Head Brain without contrast:  IMPRESSION:   1.  Normal head CT.    Report per radiology.    CT Angio Head and Neck Carotid Stroke Protocol  JEFF Candidate:  IMPRESSION:   HEAD CTA:   1.  Normal CTA [place].     NECK CTA:   1.  Normal neck CTA.     LABS:  CBC: WNL (WBC 7.5, HGB 14.7, )  BMP: Glu: 249(H), otherwise WNL (Creat 0.83)   Troponin I Qual [POC]: Negative  Type and screen: O Rh Positive, antibody S Negative  BNP: <10   Urinalysis: SP GR: <=1.005, Glu: 500, otherwise WNL        DIAGNOSIS:  ENCOUNTER DIAGNOSES   ICD-10-CM   1. Other migraine without status migrainosus, not intractable G43.809   2. Shortness of breath R06.02   3. Hyperglycemia R73.9            Review of Systems   Constitutional, cardiovascular, neuro, endocrine systems are negative, except as otherwise noted.      Objective    /76   Pulse 87   Temp 98.4  F (36.9  C) (Tympanic)   Resp 16   Wt 69.6 kg (153 lb 6.4 oz)   SpO2 98%   BMI 28.70 kg/m    Body mass index is 28.7 kg/m .  Physical Exam   GENERAL: healthy, alert and no distress  MS: no gross musculoskeletal defects noted, no edema  SKIN: no suspicious lesions or rashes  NEURO: Normal strength and tone, mentation intact and speech normal  PSYCH: mentation appears normal, affect normal/bright        Assessment & Plan     1. Vasovagal syncope    2. Encounter for screening mammogram for breast cancer        1) Care Everywhere reviewed. Imaging and labs unremarkable. EKG normal. Likely vasovagal syncopal episode - we discussed this today. If this occurs again will obtain a further work up.        Return in about 4 months (around 12/20/2020) for diabetes follow up and A1c.    Laura Bentley PA-C  [facility]3]INE

## 2020-10-07 DIAGNOSIS — E78.5 HYPERLIPIDEMIA LDL GOAL <130: ICD-10-CM

## 2020-10-07 DIAGNOSIS — G47.00 INSOMNIA, UNSPECIFIED TYPE: ICD-10-CM

## 2020-10-07 DIAGNOSIS — I10 HYPERTENSION, GOAL BELOW 140/90: ICD-10-CM

## 2020-10-08 DIAGNOSIS — E11.42 TYPE 2 DIABETES MELLITUS WITH DIABETIC POLYNEUROPATHY, WITHOUT LONG-TERM CURRENT USE OF INSULIN (H): ICD-10-CM

## 2020-10-08 DIAGNOSIS — K21.9 GASTROESOPHAGEAL REFLUX DISEASE WITHOUT ESOPHAGITIS: ICD-10-CM

## 2020-10-08 RX ORDER — LISINOPRIL 2.5 MG/1
2.5 TABLET ORAL DAILY
Qty: 90 TABLET | Refills: 0 | Status: SHIPPED | OUTPATIENT
Start: 2020-10-08 | End: 2021-01-06

## 2020-10-08 RX ORDER — AMLODIPINE BESYLATE 10 MG/1
TABLET ORAL
Qty: 90 TABLET | Refills: 0 | Status: SHIPPED | OUTPATIENT
Start: 2020-10-08 | End: 2021-01-06

## 2020-10-08 RX ORDER — SIMVASTATIN 20 MG
TABLET ORAL
Qty: 90 TABLET | Refills: 0 | Status: SHIPPED | OUTPATIENT
Start: 2020-10-08 | End: 2021-01-06

## 2020-10-08 NOTE — TELEPHONE ENCOUNTER
Requested Prescriptions   Pending Prescriptions Disp Refills     lisinopril (ZESTRIL) 2.5 MG tablet 90 tablet 0     Sig: Take 1 tablet (2.5 mg) by mouth daily   Last Written Prescription Date:  7-10-20  Last Fill Quantity: 90,  # refills: 0   Last office visit: 8/20/2020 with prescribing provider:  8-20-20   Future Office Visit:            There is no refill protocol information for this order        omeprazole (PRILOSEC) 20 MG DR capsule 90 capsule 0     Sig: Take 1 capsule (20 mg) by mouth daily   Last Written Prescription Date:  7-10-20  Last Fill Quantity: 90,  # refills: 0   Last office visit: 8/20/2020 with prescribing provider:  8-20-20   Future Office Visit:            There is no refill protocol information for this order

## 2021-01-04 DIAGNOSIS — I10 HYPERTENSION, GOAL BELOW 140/90: ICD-10-CM

## 2021-01-04 DIAGNOSIS — K21.9 GASTROESOPHAGEAL REFLUX DISEASE WITHOUT ESOPHAGITIS: ICD-10-CM

## 2021-01-04 DIAGNOSIS — E11.42 TYPE 2 DIABETES MELLITUS WITH DIABETIC POLYNEUROPATHY, WITHOUT LONG-TERM CURRENT USE OF INSULIN (H): ICD-10-CM

## 2021-01-04 DIAGNOSIS — G47.00 INSOMNIA, UNSPECIFIED TYPE: ICD-10-CM

## 2021-01-04 DIAGNOSIS — E78.5 HYPERLIPIDEMIA LDL GOAL <130: ICD-10-CM

## 2021-01-06 RX ORDER — LISINOPRIL 2.5 MG/1
2.5 TABLET ORAL DAILY
Qty: 90 TABLET | Refills: 0 | Status: SHIPPED | OUTPATIENT
Start: 2021-01-06 | End: 2021-02-01

## 2021-01-06 NOTE — TELEPHONE ENCOUNTER
"Routing refill request to provider for review/approval because:  Drug interaction warning- amlodipine/simvistatin    Pended for 90 day supply with reminder            Prescription approved per [facility] Refill Protocol.      Requested Prescriptions   Pending Prescriptions Disp Refills     amLODIPine (NORVASC) 10 MG tablet 90 tablet 0     Sig: Take 1 tablet (10 mg) by mouth daily       Calcium Channel Blockers Protocol  Passed - 1/4/2021  8:23 AM        Passed - Blood pressure under 140/90 in past 12 months     BP Readings from Last 3 Encounters:   08/20/20 111/76 06/22/20 122/80   12/05/19 122/81                 Passed - Recent (12 mo) or future (30 days) visit within the authorizing provider's specialty     Patient has had an office visit with the authorizing provider or a provider within the authorizing providers department within the previous 12 mos or has a future within next 30 days. See \"Patient Info\" tab in inbasket, or \"Choose Columns\" in Meds & Orders section of the refill encounter.              Passed - Medication is active on med list        Passed - Patient is age 18 or older        Passed - No active pregnancy on record        Passed - Normal serum creatinine on file in past 12 months     Recent Labs   Lab Test 06/22/20  0938   CR 0.53       Ok to refill medication if creatinine is low          Passed - No positive pregnancy test in past 12 months           amitriptyline (ELAVIL) 25 MG tablet 90 tablet 0     Sig: TAKE 1 TABLET(25 MG) BY MOUTH AT BEDTIME       Tricyclic Agents ( Annual appt and no PHQ9) Passed - 1/4/2021  8:23 AM        Passed - Blood Pressure under 140/90 in past 12 mos     BP Readings from Last 3 Encounters:   08/20/20 111/76   06/22/20 122/80   12/05/19 122/81                 Passed - Recent (12 mo) or future (30 days) visit within authorizing provider's specialty     Patient has had an office visit with the authorizing provider or a provider within the authorizing providers department " "within the previous 12 mos or has a future within next 30 days. See \"Patient Info\" tab in inBoosterMediaet, or \"Choose Columns\" in Meds & Orders section of the refill encounter.              Passed - Medication is active on med list        Passed - Patient is age 18 or older        Passed - Patient is not pregnant        Passed - No positive pregnancy test on record in past 12 mos           simvastatin (ZOCOR) 20 MG tablet 90 tablet 0     Sig: Take 1 tablet (20 mg) by mouth At Bedtime       Statins Protocol Passed - 1/4/2021  8:23 AM        Passed - LDL on file in past 12 months     Recent Labs   Lab Test 06/22/20  0938   LDL 71             Passed - No abnormal creatine kinase in past 12 months     No lab results found.             Passed - Recent (12 mo) or future (30 days) visit within the authorizing provider's specialty     Patient has had an office visit with the authorizing provider or a provider within the authorizing providers department within the previous 12 mos or has a future within next 30 days. See \"Patient Info\" tab in inBoosterMediaet, or \"Choose Columns\" in Meds & Orders section of the refill encounter.              Passed - Medication is active on med list        Passed - Patient is age 18 or older        Passed - No active pregnancy on record        Passed - No positive pregnancy test in past 12 months           omeprazole (PRILOSEC) 20 MG DR capsule 90 capsule 0     Sig: Take 1 capsule (20 mg) by mouth daily       PPI Protocol Passed - 1/4/2021  8:23 AM        Passed - Not on Clopidogrel (unless Pantoprazole ordered)        Passed - No diagnosis of osteoporosis on record        Passed - Recent (12 mo) or future (30 days) visit within the authorizing provider's specialty     Patient has had an office visit with the authorizing provider or a provider within the authorizing providers department within the previous 12 mos or has a future within next 30 days. See \"Patient Info\" tab in inBoosterMediaet, or \"Choose Columns\" in " "Meds & Orders section of the refill encounter.              Passed - Medication is active on med list        Passed - Patient is age 18 or older        Passed - No active pregnacy on record        Passed - No positive pregnancy test in past 12 months           lisinopril (ZESTRIL) 2.5 MG tablet 90 tablet 0     Sig: Take 1 tablet (2.5 mg) by mouth daily       ACE Inhibitors (Including Combos) Protocol Passed - 1/4/2021  8:23 AM        Passed - Blood pressure under 140/90 in past 12 months     BP Readings from Last 3 Encounters:   08/20/20 111/76   06/22/20 122/80   12/05/19 122/81                 Passed - Recent (12 mo) or future (30 days) visit within the authorizing provider's specialty     Patient has had an office visit with the authorizing provider or a provider within the authorizing providers department within the previous 12 mos or has a future within next 30 days. See \"Patient Info\" tab in inbasket, or \"Choose Columns\" in Meds & Orders section of the refill encounter.              Passed - Medication is active on med list        Passed - Patient is age 18 or older        Passed - No active pregnancy on record        Passed - Normal serum creatinine on file in past 12 months     Recent Labs   Lab Test 06/22/20  0938   CR 0.53       Ok to refill medication if creatinine is low          Passed - Normal serum potassium on file in past 12 months     Recent Labs   Lab Test 06/22/20  0938   POTASSIUM 4.2             Passed - No positive pregnancy test within past 12 months             "

## 2021-01-07 RX ORDER — SIMVASTATIN 20 MG
20 TABLET ORAL AT BEDTIME
Qty: 90 TABLET | Refills: 0 | Status: SHIPPED | OUTPATIENT
Start: 2021-01-07 | End: 2021-02-01

## 2021-01-07 RX ORDER — AMLODIPINE BESYLATE 10 MG/1
10 TABLET ORAL DAILY
Qty: 90 TABLET | Refills: 0 | Status: SHIPPED | OUTPATIENT
Start: 2021-01-07 | End: 2021-02-01

## 2021-01-07 NOTE — TELEPHONE ENCOUNTER
Torres Golden CMA contacted [facility] on 01/07/21 and left a message. If patient calls back please schedule appointment Diabetes med check/labs with pcp.

## 2021-01-11 ENCOUNTER — APPOINTMENT (OUTPATIENT)
Dept: INTERPRETER SERVICES | Facility: CLINIC | Age: 56
End: 2021-01-11
Payer: COMMERCIAL

## 2021-01-11 DIAGNOSIS — E11.42 TYPE 2 DIABETES MELLITUS WITH DIABETIC POLYNEUROPATHY, WITHOUT LONG-TERM CURRENT USE OF INSULIN (H): ICD-10-CM

## 2021-01-14 RX ORDER — LANCETS
EACH MISCELLANEOUS
Qty: 100 EACH | Refills: 0 | Status: SHIPPED | OUTPATIENT
Start: 2021-01-14 | End: 2021-02-10

## 2021-02-01 ENCOUNTER — OFFICE VISIT (OUTPATIENT)
Dept: FAMILY MEDICINE | Facility: CLINIC | Age: 56
End: 2021-02-01
Payer: COMMERCIAL

## 2021-02-01 VITALS
HEART RATE: 75 BPM | BODY MASS INDEX: 28.48 KG/M2 | DIASTOLIC BLOOD PRESSURE: 81 MMHG | RESPIRATION RATE: 16 BRPM | SYSTOLIC BLOOD PRESSURE: 126 MMHG | TEMPERATURE: 97.2 F | OXYGEN SATURATION: 100 % | WEIGHT: 152.2 LBS

## 2021-02-01 DIAGNOSIS — G47.00 INSOMNIA, UNSPECIFIED TYPE: ICD-10-CM

## 2021-02-01 DIAGNOSIS — K21.9 GASTROESOPHAGEAL REFLUX DISEASE WITHOUT ESOPHAGITIS: ICD-10-CM

## 2021-02-01 DIAGNOSIS — E11.42 TYPE 2 DIABETES MELLITUS WITH DIABETIC POLYNEUROPATHY, WITHOUT LONG-TERM CURRENT USE OF INSULIN (H): Primary | ICD-10-CM

## 2021-02-01 DIAGNOSIS — E78.5 HYPERLIPIDEMIA LDL GOAL <130: ICD-10-CM

## 2021-02-01 DIAGNOSIS — I10 HYPERTENSION, GOAL BELOW 140/90: ICD-10-CM

## 2021-02-01 DIAGNOSIS — R79.89 ELEVATED LFTS: ICD-10-CM

## 2021-02-01 LAB
ALBUMIN SERPL-MCNC: 4 G/DL (ref 3.4–5)
ALP SERPL-CCNC: 135 U/L (ref 40–150)
ALT SERPL W P-5'-P-CCNC: 28 U/L (ref 0–50)
AST SERPL W P-5'-P-CCNC: 18 U/L (ref 0–45)
BILIRUB DIRECT SERPL-MCNC: 0.2 MG/DL (ref 0–0.2)
BILIRUB SERPL-MCNC: 0.6 MG/DL (ref 0.2–1.3)
HBA1C MFR BLD: 7.5 % (ref 0–5.6)
PROT SERPL-MCNC: 7.8 G/DL (ref 6.8–8.8)

## 2021-02-01 PROCEDURE — 83036 HEMOGLOBIN GLYCOSYLATED A1C: CPT | Performed by: PHYSICIAN ASSISTANT

## 2021-02-01 PROCEDURE — 99214 OFFICE O/P EST MOD 30 MIN: CPT | Performed by: PHYSICIAN ASSISTANT

## 2021-02-01 PROCEDURE — 80076 HEPATIC FUNCTION PANEL: CPT | Performed by: PHYSICIAN ASSISTANT

## 2021-02-01 PROCEDURE — 36415 COLL VENOUS BLD VENIPUNCTURE: CPT | Performed by: PHYSICIAN ASSISTANT

## 2021-02-01 RX ORDER — LISINOPRIL 2.5 MG/1
2.5 TABLET ORAL DAILY
Qty: 90 TABLET | Refills: 1 | Status: SHIPPED | OUTPATIENT
Start: 2021-02-01 | End: 2021-07-07

## 2021-02-01 RX ORDER — SIMVASTATIN 20 MG
20 TABLET ORAL AT BEDTIME
Qty: 90 TABLET | Refills: 1 | Status: SHIPPED | OUTPATIENT
Start: 2021-02-01 | End: 2021-07-07

## 2021-02-01 RX ORDER — AMLODIPINE BESYLATE 10 MG/1
10 TABLET ORAL DAILY
Qty: 90 TABLET | Refills: 1 | Status: SHIPPED | OUTPATIENT
Start: 2021-02-01 | End: 2021-07-07

## 2021-02-01 NOTE — LETTER
February 3, 2021      Mercedes Long  [address] [place] [address]        Dear ,    We are writing to inform you of your test results.    Your A1c has increased slightly to 7.5%. I'd like to see this back to 7.0 or lower. Let's recheck it in 4 months (June).   Make sure you're watching your carb/sugar intake.   Liver enzymes are normal.   Resulted Orders   Hepatic panel   Result Value Ref Range    Bilirubin Direct 0.2 0.0 - 0.2 mg/dL    Bilirubin Total 0.6 0.2 - 1.3 mg/dL    Albumin 4.0 3.4 - 5.0 g/dL    Protein Total 7.8 6.8 - 8.8 g/dL    Alkaline Phosphatase 135 40 - 150 U/L    ALT 28 0 - 50 U/L    AST 18 0 - 45 U/L   Hemoglobin A1c   Result Value Ref Range    Hemoglobin A1C 7.5 (H) 0 - 5.6 %      Comment:      Normal <5.7% Prediabetes 5.7-6.4%  Diabetes 6.5% or higher - adopted from ADA   consensus guidelines.         If you have any questions or concerns, please call the clinic at the number listed above.       Sincerely,      Laura Bentley PA-C/chad

## 2021-02-01 NOTE — PROGRESS NOTES
"  Assessment & Plan     1. Type 2 diabetes mellitus with diabetic polyneuropathy, without long-term current use of insulin (H)    2. Elevated LFTs    3. Hyperlipidemia LDL goal <130    4. Hypertension, goal below 140/90    5. Insomnia, unspecified type    6. Gastroesophageal reflux disease without esophagitis        1,2) Due for repeat labs today. Follow up pending results. We discussed carb counting and the amount of carbs she should adhere to for meals and the day.     3-6) Meds refilled.    Review of the result(s) of each unique test - labs           BMI:   Estimated body mass index is 28.48 kg/m  as calculated from the following:    Height as of 7/17/20: 1.557 m (5' 1.3\").    Weight as of this encounter: 69 kg (152 lb 3.2 oz).       Return for follow up pending lab and/or imaging results.    Laura Bentley PA-C  [facility] [place] is a 55 year old who presents to clinic today for the following health issues  accompanied by her Son:    HPI       Diabetes Follow-up    How often are you checking your blood sugar? One time daily  What time of day are you checking your blood sugars (select all that apply)?  Before meals  Have you had any blood sugars above 200?  No  Have you had any blood sugars below 70?  No    What symptoms do you notice when your blood sugar is low?  Weak    What concerns do you have today about your diabetes? Other: how often can she eat noodles or rice     Do you have any of these symptoms? (Select all that apply)  No numbness or tingling in feet.  No redness, sores or blisters on feet.  No complaints of excessive thirst.  No reports of blurry vision.  No significant changes to weight.    Have you had a diabetic eye exam in the last 12 months? No                  BP Readings from Last 2 Encounters:   02/01/21 126/81   08/20/20 111/76     Hemoglobin A1C (%)   Date Value   06/22/2020 7.2 (H)   11/01/2019 6.6 (H)     LDL Cholesterol Calculated (mg/dL) "   Date Value   06/22/2020 71   05/13/2019 82         How many servings of fruits and vegetables do you eat daily?  2-3    On average, how many sweetened beverages do you drink each day (Examples: soda, juice, sweet tea, etc.  Do NOT count diet or artificially sweetened beverages)?   1    How many days per week do you exercise enough to make your heart beat faster? none    How many minutes a day do you exercise enough to make your heart beat faster? none    How many days per week do you miss taking your medication? 0      Review of Systems   Constitutional, endocrine systems are negative, except as otherwise noted.      Objective    /81   Pulse 75   Temp 97.2  F (36.2  C) (Tympanic)   Resp 16   Wt 69 kg (152 lb 3.2 oz)   SpO2 100%   BMI 28.48 kg/m    Body mass index is 28.48 kg/m .  Physical Exam   GENERAL: healthy, alert and no distress  MS: no gross musculoskeletal defects noted, no edema  SKIN: no suspicious lesions or rashes  NEURO: Normal strength and tone, mentation intact and speech normal  PSYCH: mentation appears normal, affect normal/bright

## 2021-02-01 NOTE — PATIENT INSTRUCTIONS
Try to eat/drink less than 200g of carbs per day.      Patient Education     B?a ?n lành m?nh ??i v?i b?nh ?ái tháo ???ng (diabetes)  Yêu c?u nhóm ch?m sóc s?c kh?e c?a quý v? giúp quý v? l?p m?t k? ho?ch b?a ?n phù h?p cho america c?u c?a quý v?. K? ho?ch b?a ?n c?a quý v? cho quý v? bi?t ?n các b?a chính và b?a v?t c?a quý v? khi nào, ?n lo?i th?c ?n nào và ?n logan nhiêu th?c ?n. Quý v? không ph?i b? t?t c? th?c ?n mà quý v? thích. Nh?ng quý v? c?n ph?i tuân th? m?t s? nguyên t?c.  ?n th?c ?n có jersey?u s?i  S?i là m?t d?ng carbohydrate có tính phân h?y ch?m. S?i c?ng có l?i cho alexa c?a quý v?. Th?c ?n có jersey?u s?i logan g?m:       Bánh m? toàn b? và l??ng th?c    B?t m? Bulgur    G?o l?t       B?t mì l?c    Elayne qu?    ??u khô, ??u Hà Rdori   Ch?n các lo?i th?c ?n protein lành m?nh  ?n protein ít ch?t béo có th? giúp quý v? ki?m soát cân n?ng c?a mình. Nó c?ng giúp gi? alexa c?a quý v? kh?e m?nh. Th?c ?n protein ít ch?t béo logan g?m:    Cá    Protein th?c v?t, ch?ng h?n nh? ??u khô và ??u Hà Rodri, các lo?i h?t và các s?n ph?m t? ??u nành nh? ??u ph? và s?a ??u nành    Th?t n?c l?c b? h?t ph?n m? nhìn th?y    Madai c?m l?c b? h?t da    S?a, baltazar mát và s?a lalit ít ch?t béo ho?c không có ch?t béo  H?n ch? ch?t béo và ???ng không có l?i cho s?c kh?e  Các ch?t béo no và trans không có l?i cho alexa c?a quý v?. Chúng làm t?ng l??ng cholesterol LDL (x?u). Ch?t béo c?ng có l??ng kalo redd, vì v?y nó khi?n quý v? t?ng cân. ?? gi?m l??ng ch?t béo không có l?i, hãy h?n ch? các lo?i th?c ?n linh:       B? ho?c macgarin    D?u c? và d?u h?t c?, d?u d?a    Kushal    B?    Th?t xông khói    Th?t ?n tr?a       Kushal    ?? n??c ng?t nh? bánh n??ng, bánh n??ng x?p, bánh rán    M?t và th?ch    Mack k?o    Các lo?i xô-?a thông th??ng   ?n logan nhiêu  L??ng th?c ?n mà quý v? ?n ?nh h??ng t?i m?c ???ng gustavo?t c?a quý v?. Nó c?ng ?nh h??ng t?i cân n?ng c?a quý v?. Nhóm ch?m sóc s?c kh?e c?a quý v? s? cho quý v? bi?t nên ?n logan nhiêu v?i m?i lo?i th?c ?n.    S?  d?ng c?c và mu?ng ??nh l??ng và cân th?c ?n ?? xác ??nh l??ng ?n.    Tìm hi?u xem l??ng ?n ?úng m?c trên ??a c?a quý v? là logan nhiêu. Nh? v?y s? giúp ích khi quý v? xa nhà và không th? xác ??nh l??ng ?n c?a mình.    Ch? ?n s? món có ethel ??a th?c ?n c?a quý v? ??i v?i m?i lo?i th?c ?n. Không ???c ?n ??a th? willis.  ?n khi nào.  K? ho?ch b?a ?n c?a quý v? th??ng s? logan g?m b?a sáng, b?a tr?a, b?a t?i và m?t vài b?a nh?.    C? g?ng ?n b?a chính ho?c b?a nh? vào cùng th?i ?i?m m?i ngày.    ?n t?t c? b?a chính và b?a nh? c?a quý v?. B? alda m?t b?a chính ho?c b?a nh? có th? khi?n m?c ???ng gustavo?t c?a quý v? s?t quá th?p. Nó c?ng có th? khi?n quý v? ?n quá jersey?u vào b?a chính ho?c b?a nh? ti?p jovita. Khi ?ó, l??ng ???ng gustavo?t c?a quý v? có th? t?ng quá redd.    [id number] [employer]. [address] [place] [id number]. T?t c? các veronique?n ???c b?o l?u. Thông tin này không nh?m thay th? cho d?ch v? ch?m sóc y t? jaimie alisson nesbitt môn. C?n luôn tuân jovita s? ch? d?n t? delicia zaragoza ch?m sóc s?c freddie? c?a quý v?.

## 2021-02-03 NOTE — RESULT ENCOUNTER NOTE
Please send the following letter to the patient:    Mercedes,    Your A1c has increased slightly to 7.5%. I'd like to see this back to 7.0 or lower. Let's recheck it in 4 months (June).   Make sure you're watching your carb/sugar intake.   Liver enzymes are normal.    Please call me with any questions or concerns.          Laura Bentley PA-C

## 2021-02-10 DIAGNOSIS — E11.42 TYPE 2 DIABETES MELLITUS WITH DIABETIC POLYNEUROPATHY, WITHOUT LONG-TERM CURRENT USE OF INSULIN (H): ICD-10-CM

## 2021-02-12 RX ORDER — LANCETS
EACH MISCELLANEOUS
Qty: 100 EACH | Refills: 0 | Status: SHIPPED | OUTPATIENT
Start: 2021-02-12 | End: 2021-03-15

## 2021-02-12 NOTE — TELEPHONE ENCOUNTER
Prescription approved per [facility] Refill Protocol.  Jessa Solorio RN  [facility]

## 2021-03-13 DIAGNOSIS — E11.42 TYPE 2 DIABETES MELLITUS WITH DIABETIC POLYNEUROPATHY, WITHOUT LONG-TERM CURRENT USE OF INSULIN (H): ICD-10-CM

## 2021-03-15 RX ORDER — LANCETS
EACH MISCELLANEOUS
Qty: 102 EACH | Refills: 1 | Status: SHIPPED | OUTPATIENT
Start: 2021-03-15

## 2021-03-15 NOTE — TELEPHONE ENCOUNTER
"Requested Prescriptions   Pending Prescriptions Disp Refills     blood glucose monitoring (ACCU-CHEK FASTCLIX) lancets [Pharmacy Med Name: ACCU-CHEK FASTCLIX LANCETS 102'S] 102 each      Sig: USE TO TEST ONE-TWO TIMES DAILY.       Diabetic Supplies Protocol Passed - 3/13/2021  3:27 AM        Passed - Medication is active on med list        Passed - Patient is 18 years of age or older        Passed - Recent (6 mo) or future (30 days) visit within the authorizing provider's specialty     Patient had office visit in the last 6 months or has a visit in the next 30 days with authorizing provider.  See \"Patient Info\" tab in inbasket, or \"Choose Columns\" in Meds & Orders section of the refill encounter.               Prescription approved per [facility] Refill Protocol.      Teri XIAO-UMAIR  Triage Nurse  [facility]: [facility]        "

## 2021-03-19 ENCOUNTER — TELEPHONE (OUTPATIENT)
Dept: FAMILY MEDICINE | Facility: CLINIC | Age: 56
End: 2021-03-19

## 2021-03-19 DIAGNOSIS — I83.93 VARICOSE VEINS OF BOTH LOWER EXTREMITIES, UNSPECIFIED WHETHER COMPLICATED: Primary | ICD-10-CM

## 2021-03-25 NOTE — TELEPHONE ENCOUNTER
Torres Golden CMA contacted [facility] on 03/25/21 and left a message. If patient calls back please ask questions from below.

## 2021-03-26 NOTE — TELEPHONE ENCOUNTER
Has patient had a blood clot in the past?? I don't have record of that. Was she advised to be on blood thinners? If she has a hx of DVT have her follow up with me to review those records     Otherwise, if it's just varicose veins - order signed

## 2021-03-26 NOTE — TELEPHONE ENCOUNTER
"With the assistance of an , patient contacted by phone.     Mercedes denies any history of blood clots or taking blood thinners. She states \"My Veins just bother me.\"     Patient informed of the order for her compression stockings and verbalized a good understanding.     Blessing Jimenez RN BSN  [facility]    "

## 2021-07-06 DIAGNOSIS — E11.42 TYPE 2 DIABETES MELLITUS WITH DIABETIC POLYNEUROPATHY, WITHOUT LONG-TERM CURRENT USE OF INSULIN (H): ICD-10-CM

## 2021-07-06 DIAGNOSIS — I10 HYPERTENSION, GOAL BELOW 140/90: ICD-10-CM

## 2021-07-06 DIAGNOSIS — K21.9 GASTROESOPHAGEAL REFLUX DISEASE WITHOUT ESOPHAGITIS: ICD-10-CM

## 2021-07-06 DIAGNOSIS — E78.5 HYPERLIPIDEMIA LDL GOAL <130: ICD-10-CM

## 2021-07-06 NOTE — LETTER
July 7, 2021        Mercedes Long  [address]  [place] [address]      Dear Mercedes,     Your medications have been approved.    However, you are due for a annual exam/diabetic recheck/fasting labs  for further refills. Please schedule this visit at your earliest convenience.    Thank you.      Laura Bentley's Care Team

## 2021-07-07 RX ORDER — SIMVASTATIN 20 MG
20 TABLET ORAL AT BEDTIME
Qty: 90 TABLET | Refills: 0 | Status: SHIPPED | OUTPATIENT
Start: 2021-07-07 | End: 2021-08-30

## 2021-07-07 RX ORDER — LISINOPRIL 2.5 MG/1
2.5 TABLET ORAL DAILY
Qty: 90 TABLET | Refills: 0 | Status: SHIPPED | OUTPATIENT
Start: 2021-07-07 | End: 2021-08-30

## 2021-07-07 RX ORDER — AMLODIPINE BESYLATE 10 MG/1
10 TABLET ORAL DAILY
Qty: 90 TABLET | Refills: 1 | Status: SHIPPED | OUTPATIENT
Start: 2021-07-07 | End: 2021-12-24

## 2021-07-07 NOTE — TELEPHONE ENCOUNTER
Medication is being filled for 1 time refill only due to:  Patient needs to be seen because needs annual exam/diabetic recheck/fasting labs.   Please schedule.  Nimisha Sousa RN

## 2021-08-09 ENCOUNTER — OFFICE VISIT (OUTPATIENT)
Dept: FAMILY MEDICINE | Facility: CLINIC | Age: 56
End: 2021-08-09
Payer: COMMERCIAL

## 2021-08-09 ENCOUNTER — ANCILLARY PROCEDURE (OUTPATIENT)
Dept: GENERAL RADIOLOGY | Facility: CLINIC | Age: 56
End: 2021-08-09
Attending: PHYSICIAN ASSISTANT
Payer: COMMERCIAL

## 2021-08-09 VITALS
RESPIRATION RATE: 14 BRPM | OXYGEN SATURATION: 97 % | WEIGHT: 149 LBS | TEMPERATURE: 98.1 F | HEART RATE: 88 BPM | BODY MASS INDEX: 27.88 KG/M2 | SYSTOLIC BLOOD PRESSURE: 134 MMHG | DIASTOLIC BLOOD PRESSURE: 84 MMHG

## 2021-08-09 DIAGNOSIS — R07.81 RIB PAIN ON RIGHT SIDE: ICD-10-CM

## 2021-08-09 DIAGNOSIS — R91.8 PULMONARY NODULES: Primary | ICD-10-CM

## 2021-08-09 DIAGNOSIS — G47.62 NOCTURNAL LEG CRAMPS: Primary | ICD-10-CM

## 2021-08-09 LAB
ANION GAP SERPL CALCULATED.3IONS-SCNC: 4 MMOL/L (ref 3–14)
CHLORIDE BLD-SCNC: 106 MMOL/L (ref 94–109)
CO2 SERPL-SCNC: 27 MMOL/L (ref 20–32)
MAGNESIUM SERPL-MCNC: 2.6 MG/DL (ref 1.6–2.3)
POTASSIUM BLD-SCNC: 4.3 MMOL/L (ref 3.4–5.3)
RADIOLOGIST FLAGS: ABNORMAL
SODIUM SERPL-SCNC: 137 MMOL/L (ref 133–144)

## 2021-08-09 PROCEDURE — 83735 ASSAY OF MAGNESIUM: CPT | Performed by: PHYSICIAN ASSISTANT

## 2021-08-09 PROCEDURE — 80051 ELECTROLYTE PANEL: CPT | Performed by: PHYSICIAN ASSISTANT

## 2021-08-09 PROCEDURE — 36415 COLL VENOUS BLD VENIPUNCTURE: CPT | Performed by: PHYSICIAN ASSISTANT

## 2021-08-09 PROCEDURE — 99214 OFFICE O/P EST MOD 30 MIN: CPT | Performed by: PHYSICIAN ASSISTANT

## 2021-08-09 PROCEDURE — 71101 X-RAY EXAM UNILAT RIBS/CHEST: CPT | Mod: RT | Performed by: RADIOLOGY

## 2021-08-09 NOTE — PATIENT INSTRUCTIONS
Count your water intake: try to get a minimum of 60 ounces of water per day. A goal of 75-90 ounces per day.    The following preventive measures may be beneficial for leg cramps:    In generally sedentary patients, riding a stationary bicycle or walking  for a 10 minutes before bed.    Stretching exercises of the calves.    Keeping the bed covers at the foot of the bed loose and not tucked in.    Maintaining adequate hydration. Avoidance of alcohol and caffeine may also be helpful. Exercising in extreme heat should be avoided.  If a cramp occurs, interventions that may be beneficial include:    Walking or leg jiggling followed by leg elevation    A hot shower with the stream directed at the cramp area of the body, usually for five minutes, or a warm tub bath    Ice massage    Hydration

## 2021-08-09 NOTE — PROGRESS NOTES
Assessment & Plan     1. Nocturnal leg cramps    2. Rib pain on right side        1) We discussed common causes of leg cramps and ways to prevent it. Will check electrolytes and magnesium today.     2) will get an x-ray of the ribs today. Sounds like a recurrent sprain/strain.    Patient Instructions   Count your water intake: try to get a minimum of 60 ounces of water per day. A goal of 75-90 ounces per day.    The following preventive measures may be beneficial for leg cramps:    In generally sedentary patients, riding a stationary bicycle or walking  for a 10 minutes before bed.    Stretching exercises of the calves.    Keeping the bed covers at the foot of the bed loose and not tucked in.    Maintaining adequate hydration. Avoidance of alcohol and caffeine may also be helpful. Exercising in extreme heat should be avoided.  If a cramp occurs, interventions that may be beneficial include:    Walking or leg jiggling followed by leg elevation    A hot shower with the stream directed at the cramp area of the body, usually for five minutes, or a warm tub bath    Ice massage    Hydration         Ordering of each unique test         Return in about 1 month (around 9/9/2021), or if symptoms worsen or fail to improve.    Laura Bentley PA-C  [facility] [place] Long is a 55 year old who presents for the following health issues     HPI     Pt is due for medication follow up and multiple labs.    Scheduled her to return 8/30- she will come fasting for that appt.      Musculoskeletal problem/pain  Onset/Duration: x1 month, twice per week  Description  Location: calf - bilateral  Joint Swelling: no  Redness: no  Pain: YES- cramping occurs mostly at night, in left leg   Warmth: no  Intensity:  8/10  Progression of Symptoms:  Worsening, occurs mostly at night and in left leg.   Accompanying signs and symptoms:   Fevers: no  Numbness/tingling/weakness: YES  History  Trauma to the area:  no  Recent illness:  no  Previous similar problem: YES  Previous evaluation:  YES  Precipitating or alleviating factors:  Aggravating factors include: medication - statin  Therapies tried and outcome: nothing      Chest wall/rib injury x2-3 years ago, on right side  Pain recurs intermittently   Most often when she's moving a certain way or lifting something heavy      Review of Systems   Constitutional, musculoskeletal, neuro, skin systems are negative, except as otherwise noted.      Objective    /84   Pulse 88   Temp 98.1  F (36.7  C) (Tympanic)   Resp 14   Wt 67.6 kg (149 lb)   SpO2 97%   Breastfeeding No   BMI 27.88 kg/m    Body mass index is 27.88 kg/m .  Physical Exam   GENERAL: healthy, alert and no distress  MS: mild TTP of the right lateral ribs  SKIN: no suspicious lesions or rashes  NEURO: Normal strength and tone, mentation intact and speech normal  PSYCH: mentation appears normal, affect normal/bright

## 2021-08-12 ENCOUNTER — APPOINTMENT (OUTPATIENT)
Dept: INTERPRETER SERVICES | Facility: CLINIC | Age: 56
End: 2021-08-12
Payer: COMMERCIAL

## 2021-08-14 ENCOUNTER — HEALTH MAINTENANCE LETTER (OUTPATIENT)
Age: 56
End: 2021-08-14

## 2021-08-16 ENCOUNTER — ANCILLARY PROCEDURE (OUTPATIENT)
Dept: CT IMAGING | Facility: CLINIC | Age: 56
End: 2021-08-16
Attending: PHYSICIAN ASSISTANT
Payer: COMMERCIAL

## 2021-08-16 DIAGNOSIS — R91.8 PULMONARY NODULES: ICD-10-CM

## 2021-08-16 PROCEDURE — 71250 CT THORAX DX C-: CPT | Mod: TC | Performed by: RADIOLOGY

## 2021-08-30 ENCOUNTER — OFFICE VISIT (OUTPATIENT)
Dept: FAMILY MEDICINE | Facility: CLINIC | Age: 56
End: 2021-08-30
Payer: COMMERCIAL

## 2021-08-30 VITALS
DIASTOLIC BLOOD PRESSURE: 83 MMHG | SYSTOLIC BLOOD PRESSURE: 123 MMHG | OXYGEN SATURATION: 97 % | RESPIRATION RATE: 16 BRPM | HEIGHT: 62 IN | WEIGHT: 148 LBS | TEMPERATURE: 97.8 F | HEART RATE: 79 BPM | BODY MASS INDEX: 27.23 KG/M2

## 2021-08-30 DIAGNOSIS — R60.9 DEPENDENT EDEMA: ICD-10-CM

## 2021-08-30 DIAGNOSIS — E78.5 HYPERLIPIDEMIA LDL GOAL <100: ICD-10-CM

## 2021-08-30 DIAGNOSIS — R79.89 ELEVATED LFTS: ICD-10-CM

## 2021-08-30 DIAGNOSIS — E11.42 TYPE 2 DIABETES MELLITUS WITH DIABETIC POLYNEUROPATHY, WITHOUT LONG-TERM CURRENT USE OF INSULIN (H): Primary | ICD-10-CM

## 2021-08-30 DIAGNOSIS — Z12.31 ENCOUNTER FOR SCREENING MAMMOGRAM FOR BREAST CANCER: ICD-10-CM

## 2021-08-30 DIAGNOSIS — I10 HYPERTENSION, GOAL BELOW 140/90: ICD-10-CM

## 2021-08-30 LAB
ALBUMIN SERPL-MCNC: 4 G/DL (ref 3.4–5)
ALP SERPL-CCNC: 152 U/L (ref 40–150)
ALT SERPL W P-5'-P-CCNC: 60 U/L (ref 0–50)
AST SERPL W P-5'-P-CCNC: 39 U/L (ref 0–45)
BILIRUB DIRECT SERPL-MCNC: 0.2 MG/DL (ref 0–0.2)
BILIRUB SERPL-MCNC: 0.7 MG/DL (ref 0.2–1.3)
CHOLEST SERPL-MCNC: 169 MG/DL
CREAT SERPL-MCNC: 0.64 MG/DL (ref 0.52–1.04)
CREAT UR-MCNC: 89 MG/DL
FASTING STATUS PATIENT QL REPORTED: YES
GFR SERPL CREATININE-BSD FRML MDRD: >90 ML/MIN/1.73M2
HBA1C MFR BLD: 7.5 % (ref 0–5.6)
HDLC SERPL-MCNC: 78 MG/DL
LDLC SERPL CALC-MCNC: 68 MG/DL
MICROALBUMIN UR-MCNC: 51 MG/L
MICROALBUMIN/CREAT UR: 57.3 MG/G CR (ref 0–25)
NONHDLC SERPL-MCNC: 91 MG/DL
PROT SERPL-MCNC: 7.8 G/DL (ref 6.8–8.8)
TRIGL SERPL-MCNC: 117 MG/DL

## 2021-08-30 PROCEDURE — 80076 HEPATIC FUNCTION PANEL: CPT | Performed by: PHYSICIAN ASSISTANT

## 2021-08-30 PROCEDURE — 99214 OFFICE O/P EST MOD 30 MIN: CPT | Performed by: PHYSICIAN ASSISTANT

## 2021-08-30 PROCEDURE — 83036 HEMOGLOBIN GLYCOSYLATED A1C: CPT | Performed by: PHYSICIAN ASSISTANT

## 2021-08-30 PROCEDURE — 36415 COLL VENOUS BLD VENIPUNCTURE: CPT | Performed by: PHYSICIAN ASSISTANT

## 2021-08-30 PROCEDURE — 82043 UR ALBUMIN QUANTITATIVE: CPT | Performed by: PHYSICIAN ASSISTANT

## 2021-08-30 PROCEDURE — 99207 ZZC FOOT EXAM  NO CHARGE: CPT | Performed by: PHYSICIAN ASSISTANT

## 2021-08-30 PROCEDURE — 82565 ASSAY OF CREATININE: CPT | Performed by: PHYSICIAN ASSISTANT

## 2021-08-30 PROCEDURE — 80061 LIPID PANEL: CPT | Performed by: PHYSICIAN ASSISTANT

## 2021-08-30 RX ORDER — SIMVASTATIN 20 MG
20 TABLET ORAL AT BEDTIME
Qty: 90 TABLET | Refills: 3 | Status: SHIPPED | OUTPATIENT
Start: 2021-08-30 | End: 2022-08-22

## 2021-08-30 RX ORDER — MULTIVITAMIN WITH IRON
1 TABLET ORAL DAILY
COMMUNITY
End: 2022-08-22

## 2021-08-30 RX ORDER — LISINOPRIL 2.5 MG/1
2.5 TABLET ORAL DAILY
Qty: 90 TABLET | Refills: 3 | Status: SHIPPED | OUTPATIENT
Start: 2021-08-30 | End: 2022-04-27

## 2021-08-30 ASSESSMENT — MIFFLIN-ST. JEOR: SCORE: 1210.32

## 2021-08-30 NOTE — PROGRESS NOTES
"    Assessment & Plan     1. Type 2 diabetes mellitus with diabetic polyneuropathy, without long-term current use of insulin (H)    2. Hypertension, goal below 140/90    3. Hyperlipidemia LDL goal <100    4. Elevated LFTs    5. Encounter for screening mammogram for breast cancer    6. Dependent edema        1-4) due for routine labs today. Blood pressure at goal. Meds renewed or with refills, no changes today.     5) Screening discussed    6) Order for compression stockings renewed.       Ordering of each unique test  Prescription drug management         BMI:   Estimated body mass index is 27.03 kg/m  as calculated from the following:    Height as of this encounter: 1.576 m (5' 2.05\").    Weight as of this encounter: 67.1 kg (148 lb).       Return in about 6 months (around 2/28/2022) for repeat A1c, lab only ok.    Laura Bentley PA-C  [facility] [place] Long is a 55 year old who presents for the following health issues     HPI     Question on Magnesium 250 mg OTC-how much should she take a day  Requesting new order for compression stocking     Diabetes Follow-up      How often are you checking your blood sugar? Not at all    What concerns do you have today about your diabetes? None     Do you have any of these symptoms? (Select all that apply)  No numbness or tingling in feet.  No redness, sores or blisters on feet.  No complaints of excessive thirst.  No reports of blurry vision.  No significant changes to weight.    Have you had a diabetic eye exam in the last 12 months? No                Hyperlipidemia Follow-Up      Are you regularly taking any medication or supplement to lower your cholesterol?   Yes- Simvastatin    Are you having muscle aches or other side effects that you think could be caused by your cholesterol lowering medication?  Yes- left leg    Hypertension Follow-up      Do you check your blood pressure regularly outside of the clinic? Yes     Are you following a " "low salt diet? Yes    Are your blood pressures ever more than 140 on the top number (systolic) OR more   than 90 on the bottom number (diastolic), for example 140/90? Yes    BP Readings from Last 2 Encounters:   08/30/21 123/83   08/09/21 134/84     Hemoglobin A1C (%)   Date Value   08/30/2021 7.5 (H)   02/01/2021 7.5 (H)   06/22/2020 7.2 (H)     LDL Cholesterol Calculated (mg/dL)   Date Value   06/22/2020 71   05/13/2019 82         Review of Systems   Constitutional, cardiovascular, neuro, skin, endocrine systems are negative, except as otherwise noted.      Objective    /83   Pulse 79   Temp 97.8  F (36.6  C) (Tympanic)   Resp 16   Ht 1.576 m (5' 2.05\")   Wt 67.1 kg (148 lb)   SpO2 97%   BMI 27.03 kg/m    Body mass index is 27.03 kg/m .  Physical Exam   GENERAL: healthy, alert and no distress  RESP: lungs clear to auscultation - no rales, rhonchi or wheezes  CV: regular rates and rhythm, normal S1 S2, no S3 or S4, no murmur, click or rub and no bruits heard   MS: no gross musculoskeletal defects noted, no edema  SKIN: no suspicious lesions or rashes  NEURO: Normal strength and tone, mentation intact and speech normal  PSYCH: mentation appears normal, affect normal/bright  Diabetic foot exam: normal DP and PT pulses, no trophic changes or ulcerative lesions, normal sensory exam, normal monofilament exam and onychomycosis          "

## 2021-09-29 ENCOUNTER — ANCILLARY PROCEDURE (OUTPATIENT)
Dept: MAMMOGRAPHY | Facility: CLINIC | Age: 56
End: 2021-09-29
Attending: PHYSICIAN ASSISTANT
Payer: COMMERCIAL

## 2021-09-29 DIAGNOSIS — Z12.31 ENCOUNTER FOR SCREENING MAMMOGRAM FOR BREAST CANCER: ICD-10-CM

## 2021-09-29 PROCEDURE — 77067 SCR MAMMO BI INCL CAD: CPT | Mod: TC | Performed by: RADIOLOGY

## 2021-10-09 ENCOUNTER — HEALTH MAINTENANCE LETTER (OUTPATIENT)
Age: 56
End: 2021-10-09

## 2021-12-22 DIAGNOSIS — G47.00 INSOMNIA, UNSPECIFIED TYPE: ICD-10-CM

## 2021-12-22 DIAGNOSIS — K21.9 GASTROESOPHAGEAL REFLUX DISEASE WITHOUT ESOPHAGITIS: ICD-10-CM

## 2021-12-22 DIAGNOSIS — I10 HYPERTENSION, GOAL BELOW 140/90: ICD-10-CM

## 2021-12-24 RX ORDER — AMLODIPINE BESYLATE 10 MG/1
TABLET ORAL
Qty: 90 TABLET | Refills: 0 | Status: SHIPPED | OUTPATIENT
Start: 2021-12-24 | End: 2022-04-27

## 2021-12-24 NOTE — TELEPHONE ENCOUNTER
Routing refill request to provider for review/approval because:  Drug interaction warning  Pended for 90 days (due for Diabetic check 2/28/22)    Blessing Jimenez RN BSN  [facility]

## 2022-01-23 ENCOUNTER — HEALTH MAINTENANCE LETTER (OUTPATIENT)
Age: 57
End: 2022-01-23

## 2022-04-27 ENCOUNTER — OFFICE VISIT (OUTPATIENT)
Dept: FAMILY MEDICINE | Facility: CLINIC | Age: 57
End: 2022-04-27
Payer: COMMERCIAL

## 2022-04-27 VITALS
OXYGEN SATURATION: 97 % | DIASTOLIC BLOOD PRESSURE: 82 MMHG | HEART RATE: 88 BPM | BODY MASS INDEX: 27.42 KG/M2 | SYSTOLIC BLOOD PRESSURE: 142 MMHG | HEIGHT: 62 IN | WEIGHT: 149 LBS | TEMPERATURE: 97.6 F

## 2022-04-27 DIAGNOSIS — R06.83 SNORING: ICD-10-CM

## 2022-04-27 DIAGNOSIS — E11.42 TYPE 2 DIABETES MELLITUS WITH DIABETIC POLYNEUROPATHY, WITHOUT LONG-TERM CURRENT USE OF INSULIN (H): ICD-10-CM

## 2022-04-27 DIAGNOSIS — I10 HYPERTENSION, GOAL BELOW 140/90: Primary | ICD-10-CM

## 2022-04-27 DIAGNOSIS — R53.83 FATIGUE, UNSPECIFIED TYPE: ICD-10-CM

## 2022-04-27 LAB
ALBUMIN SERPL-MCNC: 3.8 G/DL (ref 3.4–5)
ALP SERPL-CCNC: 154 U/L (ref 40–150)
ALT SERPL W P-5'-P-CCNC: 32 U/L (ref 0–50)
ANION GAP SERPL CALCULATED.3IONS-SCNC: 4 MMOL/L (ref 3–14)
AST SERPL W P-5'-P-CCNC: 19 U/L (ref 0–45)
BILIRUB SERPL-MCNC: 0.5 MG/DL (ref 0.2–1.3)
BUN SERPL-MCNC: 13 MG/DL (ref 7–30)
CALCIUM SERPL-MCNC: 9 MG/DL (ref 8.5–10.1)
CHLORIDE BLD-SCNC: 104 MMOL/L (ref 94–109)
CO2 SERPL-SCNC: 30 MMOL/L (ref 20–32)
CREAT SERPL-MCNC: 0.58 MG/DL (ref 0.52–1.04)
DEPRECATED CALCIDIOL+CALCIFEROL SERPL-MC: 37 UG/L (ref 20–75)
ERYTHROCYTE [DISTWIDTH] IN BLOOD BY AUTOMATED COUNT: 11.8 % (ref 10–15)
FERRITIN SERPL-MCNC: 116 NG/ML (ref 8–252)
GFR SERPL CREATININE-BSD FRML MDRD: >90 ML/MIN/1.73M2
GLUCOSE BLD-MCNC: 165 MG/DL (ref 70–99)
HBA1C MFR BLD: 7.5 % (ref 0–5.6)
HCT VFR BLD AUTO: 44.3 % (ref 35–47)
HGB BLD-MCNC: 14.4 G/DL (ref 11.7–15.7)
MCH RBC QN AUTO: 29.3 PG (ref 26.5–33)
MCHC RBC AUTO-ENTMCNC: 32.5 G/DL (ref 31.5–36.5)
MCV RBC AUTO: 90 FL (ref 78–100)
PLATELET # BLD AUTO: 274 10E3/UL (ref 150–450)
POTASSIUM BLD-SCNC: 4.6 MMOL/L (ref 3.4–5.3)
PROT SERPL-MCNC: 8.1 G/DL (ref 6.8–8.8)
RBC # BLD AUTO: 4.92 10E6/UL (ref 3.8–5.2)
SODIUM SERPL-SCNC: 138 MMOL/L (ref 133–144)
TSH SERPL DL<=0.005 MIU/L-ACNC: 3.06 MU/L (ref 0.4–4)
VIT B12 SERPL-MCNC: 634 PG/ML (ref 193–986)
WBC # BLD AUTO: 7 10E3/UL (ref 4–11)

## 2022-04-27 PROCEDURE — 99214 OFFICE O/P EST MOD 30 MIN: CPT | Performed by: PHYSICIAN ASSISTANT

## 2022-04-27 PROCEDURE — 36415 COLL VENOUS BLD VENIPUNCTURE: CPT | Performed by: PHYSICIAN ASSISTANT

## 2022-04-27 PROCEDURE — 84443 ASSAY THYROID STIM HORMONE: CPT | Performed by: PHYSICIAN ASSISTANT

## 2022-04-27 PROCEDURE — 80053 COMPREHEN METABOLIC PANEL: CPT | Performed by: PHYSICIAN ASSISTANT

## 2022-04-27 PROCEDURE — 85027 COMPLETE CBC AUTOMATED: CPT | Performed by: PHYSICIAN ASSISTANT

## 2022-04-27 PROCEDURE — 82728 ASSAY OF FERRITIN: CPT | Performed by: PHYSICIAN ASSISTANT

## 2022-04-27 PROCEDURE — 82306 VITAMIN D 25 HYDROXY: CPT | Performed by: PHYSICIAN ASSISTANT

## 2022-04-27 PROCEDURE — 83036 HEMOGLOBIN GLYCOSYLATED A1C: CPT | Performed by: PHYSICIAN ASSISTANT

## 2022-04-27 PROCEDURE — 82607 VITAMIN B-12: CPT | Performed by: PHYSICIAN ASSISTANT

## 2022-04-27 RX ORDER — LISINOPRIL 20 MG/1
20 TABLET ORAL DAILY
Qty: 30 TABLET | Refills: 0 | Status: SHIPPED | OUTPATIENT
Start: 2022-04-27 | End: 2022-05-18

## 2022-04-27 RX ORDER — AMLODIPINE BESYLATE 10 MG/1
10 TABLET ORAL DAILY
Qty: 90 TABLET | Refills: 1 | Status: SHIPPED | OUTPATIENT
Start: 2022-04-27 | End: 2022-06-12

## 2022-04-27 ASSESSMENT — PATIENT HEALTH QUESTIONNAIRE - PHQ9
SUM OF ALL RESPONSES TO PHQ QUESTIONS 1-9: 8
10. IF YOU CHECKED OFF ANY PROBLEMS, HOW DIFFICULT HAVE THESE PROBLEMS MADE IT FOR YOU TO DO YOUR WORK, TAKE CARE OF THINGS AT HOME, OR GET ALONG WITH OTHER PEOPLE: NOT DIFFICULT AT ALL
SUM OF ALL RESPONSES TO PHQ QUESTIONS 1-9: 8

## 2022-04-27 NOTE — PROGRESS NOTES
"  Assessment & Plan       ICD-10-CM    1. Hypertension, goal below 140/90  I10 Basic metabolic panel  (Ca, Cl, CO2, Creat, Gluc, K, Na, BUN)     amLODIPine (NORVASC) 10 MG tablet   2. Type 2 diabetes mellitus with diabetic polyneuropathy, without long-term current use of insulin (H)  E11.42 COMPREHENSIVE METABOLIC PANEL     HEMOGLOBIN A1C     lisinopril (ZESTRIL) 20 MG tablet     HEMOGLOBIN A1C     COMPREHENSIVE METABOLIC PANEL   3. Fatigue, unspecified type  R53.83 TSH with free T4 reflex     Vitamin D Deficiency     Vitamin B12     CBC with platelets     Ferritin     Ferritin     CBC with platelets     Vitamin B12     Vitamin D Deficiency     TSH with free T4 reflex   4. Snoring  R06.83 Adult Sleep Eval & Management  Referral       1,2) Will increase her lisinopril to 20mg daily. Routine labs in process    3) Will check some baseline labs today, follow up pending results     4) Referral to sleep specialty               BMI:   Estimated body mass index is 27.25 kg/m  as calculated from the following:    Height as of this encounter: 1.575 m (5' 2\").    Weight as of this encounter: 67.6 kg (149 lb).           Return in about 3 weeks (around 5/18/2022) for HTN and lab follow up, with me, in person, 40 mins.    Laura Bentley PA-C  [facility] [place] is a 56 year old who presents for the following health issues     History of Present Illness       Mental Health Follow-up:                    Today's PHQ-9         PHQ-9 Total Score: 8  PHQ-9 Q9 Thoughts of better off dead/self-harm past 2 weeks :   (P) Not at all    How difficult have these problems made it for you to do your work, take care of things at home, or get along with other people: Not difficult at all        Hypertension: She presents for follow up of hypertension.  She does not check blood pressure  regularly outside of the clinic. Outside blood pressures have been over 140/90. She does not follow a low salt " "diet.     She eats 0-1 servings of fruits and vegetables daily.She consumes 1 sweetened beverage(s) daily.She exercises with enough effort to increase her heart rate 10 to 19 minutes per day.  She exercises with enough effort to increase her heart rate 3 or less days per week.   She is taking medications regularly.       Doesn't add salt to food  Drinks coffee: 1 cup per day  Also feeling very fatigued  Snores      Review of Systems   Constitutional, cardiovascular, endocrine and psych systems are negative, except as otherwise noted.      Objective    BP (!) 142/82   Pulse 88   Temp 97.6  F (36.4  C) (Tympanic)   Ht 1.575 m (5' 2\")   Wt 67.6 kg (149 lb)   SpO2 97%   Breastfeeding No   BMI 27.25 kg/m    Body mass index is 27.25 kg/m .  Physical Exam   GENERAL: healthy, alert and no distress  MS: no gross musculoskeletal defects noted, no edema  SKIN: no suspicious lesions or rashes  NEURO: Normal strength and tone, mentation intact and speech normal  PSYCH: mentation appears normal, affect normal/bright    ----- Services Performed by a MEDICAL STUDENT in Presence of ATTENDING Physician-------        "

## 2022-04-28 ASSESSMENT — PATIENT HEALTH QUESTIONNAIRE - PHQ9: SUM OF ALL RESPONSES TO PHQ QUESTIONS 1-9: 8

## 2022-05-18 ENCOUNTER — OFFICE VISIT (OUTPATIENT)
Dept: FAMILY MEDICINE | Facility: CLINIC | Age: 57
End: 2022-05-18
Payer: COMMERCIAL

## 2022-05-18 VITALS
DIASTOLIC BLOOD PRESSURE: 80 MMHG | BODY MASS INDEX: 27.69 KG/M2 | TEMPERATURE: 97.1 F | OXYGEN SATURATION: 96 % | RESPIRATION RATE: 18 BRPM | SYSTOLIC BLOOD PRESSURE: 124 MMHG | WEIGHT: 151.4 LBS | HEART RATE: 84 BPM

## 2022-05-18 DIAGNOSIS — I10 HYPERTENSION, GOAL BELOW 140/90: Primary | Chronic | ICD-10-CM

## 2022-05-18 DIAGNOSIS — M94.0 COSTOCHONDRITIS: ICD-10-CM

## 2022-05-18 DIAGNOSIS — E11.42 TYPE 2 DIABETES MELLITUS WITH DIABETIC POLYNEUROPATHY, WITHOUT LONG-TERM CURRENT USE OF INSULIN (H): ICD-10-CM

## 2022-05-18 PROCEDURE — 99214 OFFICE O/P EST MOD 30 MIN: CPT | Performed by: PHYSICIAN ASSISTANT

## 2022-05-18 RX ORDER — DICLOFENAC SODIUM 75 MG/1
75 TABLET, DELAYED RELEASE ORAL 2 TIMES DAILY
Qty: 10 TABLET | Refills: 0 | Status: SHIPPED | OUTPATIENT
Start: 2022-05-18 | End: 2022-08-22

## 2022-05-18 RX ORDER — LISINOPRIL 20 MG/1
20 TABLET ORAL DAILY
Qty: 90 TABLET | Refills: 2 | Status: SHIPPED | OUTPATIENT
Start: 2022-05-18 | End: 2023-02-10

## 2022-05-18 RX ORDER — CYCLOBENZAPRINE HCL 10 MG
10 TABLET ORAL AT BEDTIME
Qty: 5 TABLET | Refills: 0 | Status: SHIPPED | OUTPATIENT
Start: 2022-05-18 | End: 2022-05-23

## 2022-05-18 ASSESSMENT — PAIN SCALES - GENERAL: PAINLEVEL: EXTREME PAIN (8)

## 2022-05-18 NOTE — PROGRESS NOTES
"  Assessment & Plan     1. Hypertension, goal below 140/90    2. Costochondritis    3. Type 2 diabetes mellitus with diabetic polyneuropathy, without long-term current use of insulin (H)        1) Recheck blood pressure at goal. Continue lisinopril 20mg daily.     2) Likely costochondritis. Flexeril at bedtime and diclofenac BID x5 days. Follow up if symptoms fail to improve or worsen.    3) Referral for eye exam      Prescription drug management         BMI:   Estimated body mass index is 27.69 kg/m  as calculated from the following:    Height as of 4/27/22: 1.575 m (5' 2\").    Weight as of this encounter: 68.7 kg (151 lb 6.4 oz).       Return in about 3 months (around 8/18/2022) for diabetes follow up, a repeat A1c, with me, in person, 40 minutes.    PURA Rico [facility] [place] Long is a 56 year old who presents for the following health issues     HPI     Hypertension Follow-up      Do you check your blood pressure regularly outside of the clinic? Yes     Are you following a low salt diet? Yes    Are your blood pressures ever more than 140 on the top number (systolic) OR more   than 90 on the bottom number (diastolic), for example 140/90? Yes      How many servings of fruits and vegetables do you eat daily?  0-1    On average, how many sweetened beverages do you drink each day (Examples: soda, juice, sweet tea, etc.  Do NOT count diet or artificially sweetened beverages)?   Varies    How many days per week do you exercise enough to make your heart beat faster? 7    How many minutes a day do you exercise enough to make your heart beat faster? 30 - 60    How many days per week do you miss taking your medication? 0    Pain History:  When did you first notice your pain? - Acute Pain   Have you seen anyone else for your pain? No  Where in your body do you have pain? Abdominal/Flank Pain  Onset/Duration: 1 week  Description:   Character: Sharp and Dull ache  Location: " right upper quadrant  Radiation: None  Intensity: 8/10  Progression of Symptoms:  same and constant  Accompanying Signs & Symptoms:  Fever/Chills: no  Gas/Bloating: no  Nausea: no  Vomitting: no  Diarrhea: no  Constipation: no  Dysuria or Hematuria: no  History:   Trauma: YES- 3-4 years ago  Previous similar pain: YES  Previous tests done: xray  Precipitating factors:   Does the pain change with:     Food: no    Bowel Movement: no    Urination: no   Other factors:  YES- moving, laughing  Therapies tried and outcome: advil   No LMP recorded (lmp unknown). Patient is postmenopausal.    TTP when pressing on the right lower anterior ribs  Pain when laying back and when sitting up      Review of Systems   Constitutional, cardiovascular, musculoskeletal, endocrine systems are negative, except as otherwise noted.      Objective    /80 (BP Location: Right arm, Patient Position: Sitting, Cuff Size: Adult Regular)   Pulse 84   Temp 97.1  F (36.2  C) (Tympanic)   Resp 18   Wt 68.7 kg (151 lb 6.4 oz)   LMP  (LMP Unknown)   SpO2 96%   Breastfeeding No   BMI 27.69 kg/m    Body mass index is 27.69 kg/m .  Physical Exam   GENERAL: healthy, alert and no distress  MS: moderate TTP of the right lower anterior ribs  SKIN: no suspicious lesions or rashes  NEURO: Normal strength and tone, mentation intact and speech normal  PSYCH: mentation appears normal, affect normal/bright

## 2022-05-18 NOTE — PATIENT INSTRUCTIONS
Ask your insurance about the Shingles vaccine:  1) Is it covered?  2) Where should I get it? Clinic? Clinic pharmacy? Or [facility]?

## 2022-05-21 DIAGNOSIS — M94.0 COSTOCHONDRITIS: ICD-10-CM

## 2022-05-23 DIAGNOSIS — M94.0 COSTOCHONDRITIS: ICD-10-CM

## 2022-05-23 RX ORDER — DICLOFENAC SODIUM 75 MG/1
TABLET, DELAYED RELEASE ORAL
Qty: 10 TABLET | Refills: 0 | OUTPATIENT
Start: 2022-05-23

## 2022-05-23 NOTE — TELEPHONE ENCOUNTER
Attempted to call patient via Portuguese  to clarify prescription request as it was only prescribed for 5 days. Per IDANA Vazquez's note from 5/18/22 patient should follow-up if symptoms of costochondritis are not improving. No answer, left voicemail and requested patient call back at [phone].     When patient returns call, please clarify if she requested refill of Voltaren. If symptoms are not improving, please assist in scheduling follow-up appointment with DIANA Vazquez.     Dinora Bueno RN   [facility]

## 2022-05-24 NOTE — TELEPHONE ENCOUNTER
Called [phone] (home) using     Did patient answer the phone: No, left a message on voicemail to return call to the [facility] at [phone].    Teri RN,BSN  Triage Nurse  [facility]: [facility]

## 2022-05-25 NOTE — TELEPHONE ENCOUNTER
Called [phone] (home) using .    Did patient answer the phone: No, left a message on voicemail to return call to the [facility] at [phone].    Teri RN,BSN  Triage Nurse  [facility]: [facility]

## 2022-05-25 NOTE — TELEPHONE ENCOUNTER
Left message via Sri Lankan  on voice mail for patient to call clinic.   [phone]  Jessa Solorio RN  [facility]

## 2022-05-26 RX ORDER — DICLOFENAC SODIUM 75 MG/1
TABLET, DELAYED RELEASE ORAL
Qty: 10 TABLET | Refills: 0 | OUTPATIENT
Start: 2022-05-26

## 2022-05-26 NOTE — TELEPHONE ENCOUNTER
Messages left for patient to return call.  Will send message to pharmacy saying to have patient contact clinic.

## 2022-06-06 DIAGNOSIS — I10 HYPERTENSION, GOAL BELOW 140/90: ICD-10-CM

## 2022-06-06 DIAGNOSIS — G47.00 INSOMNIA, UNSPECIFIED TYPE: ICD-10-CM

## 2022-06-06 DIAGNOSIS — K21.9 GASTROESOPHAGEAL REFLUX DISEASE WITHOUT ESOPHAGITIS: ICD-10-CM

## 2022-06-09 RX ORDER — AMLODIPINE BESYLATE 10 MG/1
10 TABLET ORAL DAILY
Qty: 90 TABLET | Refills: 1 | OUTPATIENT
Start: 2022-06-09

## 2022-06-09 RX ORDER — SIMVASTATIN 20 MG
TABLET ORAL
Qty: 90 TABLET | Refills: 3 | OUTPATIENT
Start: 2022-06-09

## 2022-06-10 DIAGNOSIS — I10 HYPERTENSION, GOAL BELOW 140/90: ICD-10-CM

## 2022-06-12 RX ORDER — AMLODIPINE BESYLATE 10 MG/1
10 TABLET ORAL DAILY
Qty: 90 TABLET | Refills: 0 | Status: SHIPPED | OUTPATIENT
Start: 2022-06-12 | End: 2022-12-05

## 2022-08-22 ENCOUNTER — TELEPHONE (OUTPATIENT)
Dept: FAMILY MEDICINE | Facility: CLINIC | Age: 57
End: 2022-08-22

## 2022-08-22 ENCOUNTER — OFFICE VISIT (OUTPATIENT)
Dept: FAMILY MEDICINE | Facility: CLINIC | Age: 57
End: 2022-08-22
Payer: COMMERCIAL

## 2022-08-22 VITALS
HEART RATE: 75 BPM | SYSTOLIC BLOOD PRESSURE: 121 MMHG | WEIGHT: 154.2 LBS | BODY MASS INDEX: 28.2 KG/M2 | TEMPERATURE: 97.7 F | OXYGEN SATURATION: 98 % | DIASTOLIC BLOOD PRESSURE: 82 MMHG

## 2022-08-22 DIAGNOSIS — I10 HYPERTENSION, GOAL BELOW 140/90: ICD-10-CM

## 2022-08-22 DIAGNOSIS — E78.5 HYPERLIPIDEMIA LDL GOAL <100: ICD-10-CM

## 2022-08-22 DIAGNOSIS — Z53.9 ERRONEOUS ENCOUNTER--DISREGARD: Primary | ICD-10-CM

## 2022-08-22 DIAGNOSIS — E11.42 TYPE 2 DIABETES MELLITUS WITH DIABETIC POLYNEUROPATHY, WITHOUT LONG-TERM CURRENT USE OF INSULIN (H): ICD-10-CM

## 2022-08-22 DIAGNOSIS — Z23 HIGH PRIORITY FOR 2019-NCOV VACCINE: ICD-10-CM

## 2022-08-22 LAB
ANION GAP SERPL CALCULATED.3IONS-SCNC: 1 MMOL/L (ref 3–14)
BUN SERPL-MCNC: 14 MG/DL (ref 7–30)
CALCIUM SERPL-MCNC: 9.1 MG/DL (ref 8.5–10.1)
CHLORIDE BLD-SCNC: 105 MMOL/L (ref 94–109)
CHOLEST SERPL-MCNC: 158 MG/DL
CO2 SERPL-SCNC: 32 MMOL/L (ref 20–32)
CREAT SERPL-MCNC: 0.6 MG/DL (ref 0.52–1.04)
CREAT UR-MCNC: 140 MG/DL
FASTING STATUS PATIENT QL REPORTED: YES
GFR SERPL CREATININE-BSD FRML MDRD: >90 ML/MIN/1.73M2
GLUCOSE BLD-MCNC: 171 MG/DL (ref 70–99)
HBA1C MFR BLD: 8.3 % (ref 0–5.6)
HDLC SERPL-MCNC: 81 MG/DL
LDLC SERPL CALC-MCNC: 55 MG/DL
MICROALBUMIN UR-MCNC: 221 MG/L
MICROALBUMIN/CREAT UR: 157.86 MG/G CR (ref 0–25)
NONHDLC SERPL-MCNC: 77 MG/DL
POTASSIUM BLD-SCNC: 4.5 MMOL/L (ref 3.4–5.3)
SODIUM SERPL-SCNC: 138 MMOL/L (ref 133–144)
TRIGL SERPL-MCNC: 109 MG/DL

## 2022-08-22 PROCEDURE — 80061 LIPID PANEL: CPT | Performed by: PHYSICIAN ASSISTANT

## 2022-08-22 PROCEDURE — 82043 UR ALBUMIN QUANTITATIVE: CPT | Performed by: PHYSICIAN ASSISTANT

## 2022-08-22 PROCEDURE — 80048 BASIC METABOLIC PNL TOTAL CA: CPT | Performed by: PHYSICIAN ASSISTANT

## 2022-08-22 PROCEDURE — 83036 HEMOGLOBIN GLYCOSYLATED A1C: CPT | Performed by: PHYSICIAN ASSISTANT

## 2022-08-22 PROCEDURE — 36415 COLL VENOUS BLD VENIPUNCTURE: CPT | Performed by: PHYSICIAN ASSISTANT

## 2022-08-22 RX ORDER — SIMVASTATIN 20 MG
20 TABLET ORAL AT BEDTIME
Qty: 90 TABLET | Refills: 3 | Status: SHIPPED | OUTPATIENT
Start: 2022-08-22 | End: 2023-07-31

## 2022-08-22 RX ORDER — METFORMIN HCL 500 MG
500 TABLET, EXTENDED RELEASE 24 HR ORAL
Qty: 90 TABLET | Refills: 0 | Status: SHIPPED | OUTPATIENT
Start: 2022-08-22 | End: 2022-11-21

## 2022-08-22 ASSESSMENT — PAIN SCALES - GENERAL: PAINLEVEL: NO PAIN (0)

## 2022-08-22 NOTE — TELEPHONE ENCOUNTER
Please call patient with  services with the following info:    She left after labs were drawn today - she was supposed to return to her room to review her results with me.     Her A1c is >8% and her diabetes is not well controlled. I'd like her to start a med called Metformin once daily. We can follow up in 3 months, OV, and recheck an A1c.    The amount of excess protein in her urine has increased - this is a sign of kidney dysfunction and likely related to her diabetes not being well controlled. Will recheck this yearly

## 2022-08-22 NOTE — LETTER
August 25, 2022      Mercedes Long  [address]  [place] [address]        Dear ,    We are writing to inform you of your test results. We have tried to reach you by telephone and have been unsuccessful. Please see below:    Please call patient with  services with the following info:     She left after labs were drawn today - she was supposed to return to her room to review her results with me.      Her A1c is >8% and her diabetes is not well controlled. I'd like her to start a med called Metformin once daily. We can follow up in 3 months, OV, and recheck an A1c.     The amount of excess protein in her urine has increased - this is a sign of kidney dysfunction and likely related to her diabetes not being well controlled. Will recheck this yearly     Resulted Orders   Basic metabolic panel  (Ca, Cl, CO2, Creat, Gluc, K, Na, BUN)   Result Value Ref Range    Sodium 138 133 - 144 mmol/L    Potassium 4.5 3.4 - 5.3 mmol/L    Chloride 105 94 - 109 mmol/L    Carbon Dioxide (CO2) 32 20 - 32 mmol/L    Anion Gap 1 (L) 3 - 14 mmol/L    Urea Nitrogen 14 7 - 30 mg/dL    Creatinine 0.60 0.52 - 1.04 mg/dL    Calcium 9.1 8.5 - 10.1 mg/dL    Glucose 171 (H) 70 - 99 mg/dL    GFR Estimate >90 >60 mL/min/1.73m2      Comment:      Effective December 21, 2021 eGFRcr in adults is calculated using the 2021 CKD-EPI creatinine equation which includes age and gender (Rosmery wallis al., NE, DOI: 10.1056/JHVAxx8018407)   HEMOGLOBIN A1C   Result Value Ref Range    Hemoglobin A1C 8.3 (H) 0.0 - 5.6 %      Comment:      Normal <5.7%   Prediabetes 5.7-6.4%    Diabetes 6.5% or higher     Note: Adopted from ADA consensus guidelines.   Lipid panel reflex to direct LDL Non-fasting   Result Value Ref Range    Cholesterol 158 <200 mg/dL    Triglycerides 109 <150 mg/dL    Direct Measure HDL 81 >=50 mg/dL    LDL Cholesterol Calculated 55 <=100 mg/dL    Non HDL Cholesterol 77 <130 mg/dL    Patient Fasting > 8hrs? Yes     Narrative     Cholesterol  Desirable:  <200 mg/dL    Triglycerides  Normal:  Less than 150 mg/dL  Borderline High:  150-199 mg/dL  High:  200-499 mg/dL  Very High:  Greater than or equal to 500 mg/dL    Direct Measure HDL  Female:  Greater than or equal to 50 mg/dL   Male:  Greater than or equal to 40 mg/dL    LDL Cholesterol  Desirable:  <100mg/dL  Above Desirable:  100-129 mg/dL   Borderline High:  130-159 mg/dL   High:  160-189 mg/dL   Very High:  >= 190 mg/dL    Non HDL Cholesterol  Desirable:  130 mg/dL  Above Desirable:  130-159 mg/dL  Borderline High:  160-189 mg/dL  High:  190-219 mg/dL  Very High:  Greater than or equal to 220 mg/dL   Albumin Random Urine Quantitative with Creat Ratio   Result Value Ref Range    Creatinine Urine mg/dL 140 mg/dL    Albumin Urine mg/L 221 mg/L    Albumin Urine mg/g Cr 157.86 (H) 0.00 - 25.00 mg/g Cr     If you have any questions or concerns, please call the clinic at the number listed above.     Sincerely,    Laura Bentley PA-C/sawo

## 2022-08-22 NOTE — PROGRESS NOTES
"  Assessment & Plan     1. Type 2 diabetes mellitus with diabetic polyneuropathy, without long-term current use of insulin (H)    2. Hyperlipidemia LDL goal <100    3. Hypertension, goal below 140/90    4. High priority for 2019-nCoV vaccine          Ordering of each unique test  Prescription drug management    {Provider  Link to [facility] Help Grid :915301}     BMI:   Estimated body mass index is 28.2 kg/m  as calculated from the following:    Height as of 4/27/22: 1.575 m (5' 2\").    Weight as of this encounter: 69.9 kg (154 lb 3.2 oz).     No follow-ups on file.    Laura Bentley PA-C  [facility] [place] is a 56 year old, presenting for the following health issues:  Recheck Medication      History of Present Illness       Hypertension: She presents for follow up of hypertension.  She does check blood pressure  regularly outside of the clinic. Outside blood pressures have been over 140/90. She follows a low salt diet.         Diabetes Follow-up    How often are you checking your blood sugar? A few times a month  What time of day are you checking your blood sugars (select all that apply)?  Before meals  Have you had any blood sugars above 200?  No  Have you had any blood sugars below 70?  No    What symptoms do you notice when your blood sugar is low?  None    What concerns do you have today about your diabetes? None     Do you have any of these symptoms? (Select all that apply)  No numbness or tingling in feet.  No redness, sores or blisters on feet.  No complaints of excessive thirst.  No reports of blurry vision.  No significant changes to weight.    Have you had a diabetic eye exam in the last 12 months? No        {Reference  Diabetes Management Resources :934827}    {Reference  Diabetes Log - 7 days :374850}    Hyperlipidemia Follow-Up      Are you regularly taking any medication or supplement to lower your cholesterol?   Yes- simvastatin (ZOCOR) 20 MG tablet    Are you " having muscle aches or other side effects that you think could be caused by your cholesterol lowering medication?  Yes- once in a while      BP Readings from Last 2 Encounters:   08/22/22 121/82   05/18/22 124/80     Hemoglobin A1C (%)   Date Value   08/22/2022 8.3 (H)   04/27/2022 7.5 (H)   02/01/2021 7.5 (H)   06/22/2020 7.2 (H)     LDL Cholesterol Calculated (mg/dL)   Date Value   08/30/2021 68   06/22/2020 71   05/13/2019 82         How many servings of fruits and vegetables do you eat daily?  1-2    On average, how many sweetened beverages do you drink each day (Examples: soda, juice, sweet tea, etc.  Do NOT count diet or artificially sweetened beverages)?   0    How many days per week do you exercise enough to make your heart beat faster? 7    How many minutes a day do you exercise enough to make your heart beat faster? 30 - 60    How many days per week do you miss taking your medication? 0      Review of Systems   Constitutional, cardiovascular, endocrine and psych systems are negative, except as otherwise noted.      Objective    /82 (BP Location: Left arm, Patient Position: Sitting, Cuff Size: Adult Regular)   Pulse 75   Temp 97.7  F (36.5  C) (Tympanic)   Wt 69.9 kg (154 lb 3.2 oz)   LMP  (LMP Unknown)   SpO2 98%   Breastfeeding No   BMI 28.20 kg/m    Body mass index is 28.2 kg/m .  Physical Exam   {Exam List (Optional):992940}    {Diagnostic Test Results (Optional):012677}              .  ..

## 2022-08-23 NOTE — TELEPHONE ENCOUNTER
Called   Mercedes Long T (Self) [phone] (H)   Using  services.      Did patient answer the phone: No, left a message on voicemail to return call to the [facility] at [phone].    Teri RN,BSN  Triage Nurse  [facility]: [facility]

## 2022-08-24 NOTE — TELEPHONE ENCOUNTER
"With the assistance of a Kyrgyz , Message left for patient to call the [facility] at [phone].     I called the pharmacy ([id number] - [place] - [address] Pharmacist confirmed that patient has NOT picked up MetFORMIN (GLUCOPHAGE XR) 500 MG 24 hr tablet simvastatin or  (ZOCOR) 20 MG tablet. Both are ready for . The pharmacy has the same phone number \"on file\" for patient.   [phone].     I asked the pharmacist to put a note on the prescription bag instructing patient to call Laura Bentley's office.     Routing to  to send a letter to patient.      Blessing Jimenez RN BSN  [facility]     "

## 2022-09-02 ENCOUNTER — ALLIED HEALTH/NURSE VISIT (OUTPATIENT)
Dept: FAMILY MEDICINE | Facility: CLINIC | Age: 57
End: 2022-09-02
Payer: COMMERCIAL

## 2022-09-02 DIAGNOSIS — Z23 NEED FOR VACCINATION FOR PNEUMOCOCCUS: Primary | ICD-10-CM

## 2022-09-02 PROCEDURE — 90471 IMMUNIZATION ADMIN: CPT

## 2022-09-02 PROCEDURE — 90677 PCV20 VACCINE IM: CPT

## 2022-09-02 PROCEDURE — 99207 PR NO CHARGE NURSE ONLY: CPT

## 2022-09-02 NOTE — NURSING NOTE
Prior to immunization administration, verified patients identity using patient s name and date of birth. Please see Immunization Activity for additional information.     Screening Questionnaire for Adult Immunization    Are you sick today?   No   Do you have allergies to medications, food, a vaccine component or latex?   No   Have you ever had a serious reaction after receiving a vaccination?   No   Do you have a long-term health problem with heart, lung, kidney, or metabolic disease (e.g., diabetes), asthma, a blood disorder, no spleen, complement component deficiency, a cochlear implant, or a spinal fluid leak?  Are you on long-term aspirin therapy?   Yes, diabetes   Do you have cancer, leukemia, HIV/AIDS, or any other immune system problem?   No   Do you have a parent, brother, or sister with an immune system problem?   No   In the past 3 months, have you taken medications that affect  your immune system, such as prednisone, other steroids, or anticancer drugs; drugs for the treatment of rheumatoid arthritis, Crohn s disease, or psoriasis; or have you had radiation treatments?   No   Have you had a seizure, or a brain or other nervous system problem?   No   During the past year, have you received a transfusion of blood or blood    products, or been given immune (gamma) globulin or antiviral drug?   No   For women: Are you pregnant or is there a chance you could become       pregnant during the next month?   No   Have you received any vaccinations in the past 4 weeks?   No     Immunization questionnaire was positive for at least one answer.        Per orders of Dr. Deleon, injection of Prev 20 given by Reno Ashford. Patient instructed to remain in clinic for 15 minutes afterwards, and to report any adverse reaction to me immediately.       Screening performed by Reno Ashford on 9/2/2022 at 9:32 AM.  [id number], MEDICAL ASSISTANT

## 2022-09-11 ENCOUNTER — HEALTH MAINTENANCE LETTER (OUTPATIENT)
Age: 57
End: 2022-09-11

## 2022-11-20 DIAGNOSIS — E11.42 TYPE 2 DIABETES MELLITUS WITH DIABETIC POLYNEUROPATHY, WITHOUT LONG-TERM CURRENT USE OF INSULIN (H): ICD-10-CM

## 2022-11-21 RX ORDER — METFORMIN HCL 500 MG
TABLET, EXTENDED RELEASE 24 HR ORAL
Qty: 90 TABLET | Refills: 0 | Status: SHIPPED | OUTPATIENT
Start: 2022-11-21 | End: 2024-02-26

## 2022-12-03 DIAGNOSIS — I10 HYPERTENSION, GOAL BELOW 140/90: ICD-10-CM

## 2022-12-05 RX ORDER — AMLODIPINE BESYLATE 10 MG/1
TABLET ORAL
Qty: 90 TABLET | Refills: 0 | Status: SHIPPED | OUTPATIENT
Start: 2022-12-05 | End: 2023-06-13

## 2022-12-07 DIAGNOSIS — K21.9 GASTROESOPHAGEAL REFLUX DISEASE WITHOUT ESOPHAGITIS: ICD-10-CM

## 2022-12-07 DIAGNOSIS — G47.00 INSOMNIA, UNSPECIFIED TYPE: ICD-10-CM

## 2023-01-22 ENCOUNTER — HEALTH MAINTENANCE LETTER (OUTPATIENT)
Age: 58
End: 2023-01-22

## 2023-03-14 DIAGNOSIS — K21.9 GASTROESOPHAGEAL REFLUX DISEASE WITHOUT ESOPHAGITIS: ICD-10-CM

## 2023-03-14 DIAGNOSIS — G47.00 INSOMNIA, UNSPECIFIED TYPE: ICD-10-CM

## 2023-05-17 DIAGNOSIS — E11.42 TYPE 2 DIABETES MELLITUS WITH DIABETIC POLYNEUROPATHY, WITHOUT LONG-TERM CURRENT USE OF INSULIN (H): ICD-10-CM

## 2023-05-17 RX ORDER — LISINOPRIL 20 MG/1
TABLET ORAL
Qty: 90 TABLET | Refills: 0 | Status: SHIPPED | OUTPATIENT
Start: 2023-05-17 | End: 2023-07-31

## 2023-06-03 ENCOUNTER — HEALTH MAINTENANCE LETTER (OUTPATIENT)
Age: 58
End: 2023-06-03

## 2023-06-13 DIAGNOSIS — I10 HYPERTENSION, GOAL BELOW 140/90: ICD-10-CM

## 2023-06-13 DIAGNOSIS — G47.00 INSOMNIA, UNSPECIFIED TYPE: ICD-10-CM

## 2023-06-13 DIAGNOSIS — K21.9 GASTROESOPHAGEAL REFLUX DISEASE WITHOUT ESOPHAGITIS: ICD-10-CM

## 2023-06-13 RX ORDER — AMLODIPINE BESYLATE 10 MG/1
10 TABLET ORAL DAILY
Qty: 30 TABLET | Refills: 0 | OUTPATIENT
Start: 2023-06-13

## 2023-07-18 ENCOUNTER — TELEPHONE (OUTPATIENT)
Dept: FAMILY MEDICINE | Facility: CLINIC | Age: 58
End: 2023-07-18
Payer: COMMERCIAL

## 2023-07-18 DIAGNOSIS — I10 HYPERTENSION, GOAL BELOW 140/90: ICD-10-CM

## 2023-07-18 RX ORDER — AMLODIPINE BESYLATE 10 MG/1
10 TABLET ORAL DAILY
Qty: 30 TABLET | Refills: 0 | Status: SHIPPED | OUTPATIENT
Start: 2023-07-18 | End: 2023-07-31

## 2023-07-18 RX ORDER — AMLODIPINE BESYLATE 10 MG/1
10 TABLET ORAL DAILY
Qty: 30 TABLET | Refills: 0 | Status: CANCELLED | OUTPATIENT
Start: 2023-07-18

## 2023-07-18 NOTE — TELEPHONE ENCOUNTER
Ji called back, he is with his mom.   I advised the 30 day supply was sent and they should fill that, Laura will send a longer fill after Mercedes is seen later this month.    Ji verbalized understanding and agreement.    Will  30 day supply.    Angélica Kc RN  [facility]

## 2023-07-18 NOTE — TELEPHONE ENCOUNTER
Pt's son (Ji, no consent to communicate) calling to ask for pt's refill of amLODIPine (NORVASC) 10 MG tablet. Informed son that we cannot disclose PHI with him. Son stated that he is only looking to relay message to pcp that pt will be running out of this medication and looking for a refill as the other medications that pt's pharmacy requested to be filled were ready except this one.     Kanika Banda RN on 7/18/2023 at 10:57 AM

## 2023-07-18 NOTE — TELEPHONE ENCOUNTER
See refill request 7/18/23; script for 30 tablets filled per RN protocol.     Called pharmacy to confirm that pt is eligible to receive 90-day quantity of this medication for future reference for pcp.    Called pt with number on file, [phone] is son (Ji's number, no consent to communicate on file), son stated to call back at a later time as he is currently driving and will be with pt to give a one time verbal okay to discuss pt's PHI.

## 2023-07-31 ENCOUNTER — OFFICE VISIT (OUTPATIENT)
Dept: FAMILY MEDICINE | Facility: CLINIC | Age: 58
End: 2023-07-31
Payer: COMMERCIAL

## 2023-07-31 ENCOUNTER — ANCILLARY PROCEDURE (OUTPATIENT)
Dept: GENERAL RADIOLOGY | Facility: CLINIC | Age: 58
End: 2023-07-31
Attending: PHYSICIAN ASSISTANT
Payer: COMMERCIAL

## 2023-07-31 VITALS
WEIGHT: 150 LBS | TEMPERATURE: 97.4 F | OXYGEN SATURATION: 98 % | HEART RATE: 85 BPM | DIASTOLIC BLOOD PRESSURE: 68 MMHG | BODY MASS INDEX: 27.6 KG/M2 | HEIGHT: 62 IN | SYSTOLIC BLOOD PRESSURE: 110 MMHG

## 2023-07-31 DIAGNOSIS — I10 HYPERTENSION, GOAL BELOW 140/90: ICD-10-CM

## 2023-07-31 DIAGNOSIS — G47.00 INSOMNIA, UNSPECIFIED TYPE: ICD-10-CM

## 2023-07-31 DIAGNOSIS — E78.5 HYPERLIPIDEMIA LDL GOAL <100: ICD-10-CM

## 2023-07-31 DIAGNOSIS — M25.562 ACUTE PAIN OF LEFT KNEE: ICD-10-CM

## 2023-07-31 DIAGNOSIS — M25.552 HIP PAIN, LEFT: Primary | ICD-10-CM

## 2023-07-31 DIAGNOSIS — K21.9 GASTROESOPHAGEAL REFLUX DISEASE WITHOUT ESOPHAGITIS: ICD-10-CM

## 2023-07-31 DIAGNOSIS — M25.552 HIP PAIN, LEFT: ICD-10-CM

## 2023-07-31 DIAGNOSIS — R21 RASH AND NONSPECIFIC SKIN ERUPTION: ICD-10-CM

## 2023-07-31 DIAGNOSIS — E11.42 TYPE 2 DIABETES MELLITUS WITH DIABETIC POLYNEUROPATHY, WITHOUT LONG-TERM CURRENT USE OF INSULIN (H): ICD-10-CM

## 2023-07-31 LAB
ALBUMIN SERPL BCG-MCNC: 4.5 G/DL (ref 3.5–5.2)
ALP SERPL-CCNC: 125 U/L (ref 35–104)
ALT SERPL W P-5'-P-CCNC: 32 U/L (ref 0–50)
ANION GAP SERPL CALCULATED.3IONS-SCNC: 8 MMOL/L (ref 7–15)
AST SERPL W P-5'-P-CCNC: 40 U/L (ref 0–45)
BILIRUB SERPL-MCNC: 0.4 MG/DL
BUN SERPL-MCNC: 11.1 MG/DL (ref 8–23)
CALCIUM SERPL-MCNC: 9.3 MG/DL (ref 8.6–10)
CHLORIDE SERPL-SCNC: 102 MMOL/L (ref 98–107)
CHOLEST SERPL-MCNC: 170 MG/DL
CREAT SERPL-MCNC: 0.54 MG/DL (ref 0.51–0.95)
DEPRECATED HCO3 PLAS-SCNC: 27 MMOL/L (ref 22–29)
GFR SERPL CREATININE-BSD FRML MDRD: >90 ML/MIN/1.73M2
GLUCOSE SERPL-MCNC: 290 MG/DL (ref 70–99)
HBA1C MFR BLD: 9.5 % (ref 0–5.6)
HDLC SERPL-MCNC: 61 MG/DL
LDLC SERPL CALC-MCNC: 62 MG/DL
NONHDLC SERPL-MCNC: 109 MG/DL
POTASSIUM SERPL-SCNC: 4.8 MMOL/L (ref 3.4–5.3)
PROT SERPL-MCNC: 7.5 G/DL (ref 6.4–8.3)
SODIUM SERPL-SCNC: 137 MMOL/L (ref 136–145)
TRIGL SERPL-MCNC: 233 MG/DL

## 2023-07-31 PROCEDURE — 73502 X-RAY EXAM HIP UNI 2-3 VIEWS: CPT | Mod: TC | Performed by: RADIOLOGY

## 2023-07-31 PROCEDURE — 80053 COMPREHEN METABOLIC PANEL: CPT | Performed by: PHYSICIAN ASSISTANT

## 2023-07-31 PROCEDURE — 73560 X-RAY EXAM OF KNEE 1 OR 2: CPT | Mod: TC | Performed by: RADIOLOGY

## 2023-07-31 PROCEDURE — 36415 COLL VENOUS BLD VENIPUNCTURE: CPT | Performed by: PHYSICIAN ASSISTANT

## 2023-07-31 PROCEDURE — 80061 LIPID PANEL: CPT | Performed by: PHYSICIAN ASSISTANT

## 2023-07-31 PROCEDURE — 83036 HEMOGLOBIN GLYCOSYLATED A1C: CPT | Performed by: PHYSICIAN ASSISTANT

## 2023-07-31 PROCEDURE — 99214 OFFICE O/P EST MOD 30 MIN: CPT | Performed by: PHYSICIAN ASSISTANT

## 2023-07-31 RX ORDER — SIMVASTATIN 20 MG
20 TABLET ORAL AT BEDTIME
Qty: 90 TABLET | Refills: 0 | Status: SHIPPED | OUTPATIENT
Start: 2023-07-31 | End: 2023-11-13

## 2023-07-31 RX ORDER — AMLODIPINE BESYLATE 10 MG/1
10 TABLET ORAL DAILY
Qty: 90 TABLET | Refills: 0 | Status: SHIPPED | OUTPATIENT
Start: 2023-07-31 | End: 2023-08-17

## 2023-07-31 RX ORDER — LISINOPRIL 20 MG/1
20 TABLET ORAL DAILY
Qty: 90 TABLET | Refills: 0 | Status: SHIPPED | OUTPATIENT
Start: 2023-07-31 | End: 2023-10-30

## 2023-07-31 NOTE — PROGRESS NOTES
"  Assessment & Plan       ICD-10-CM    1. Hip pain, left  M25.552 Physical Therapy Referral     XR Pelvis and Hip Left 1 View      2. Acute pain of left knee  M25.562 Physical Therapy Referral     XR Knee Standing AP Bilat and Lateral Left      3. Type 2 diabetes mellitus with diabetic polyneuropathy, without long-term current use of insulin (H)  E11.42 Adult Eye  Referral     HEMOGLOBIN A1C     Lipid panel reflex to direct LDL Non-fasting     Albumin Random Urine Quantitative with Creat Ratio     lisinopril (ZESTRIL) 20 MG tablet      4. Hypertension, goal below 140/90  I10 COMPREHENSIVE METABOLIC PANEL     amLODIPine (NORVASC) 10 MG tablet      5. Hyperlipidemia LDL goal <100  E78.5 simvastatin (ZOCOR) 20 MG tablet      6. Insomnia, unspecified type  G47.00 amitriptyline (ELAVIL) 25 MG tablet      7. Gastroesophageal reflux disease without esophagitis  K21.9 omeprazole (PRILOSEC) 20 MG DR capsule      8. Rash and nonspecific skin eruption  R21             1,2) Xrays in process. Referral to physical therapy for further eval/tx. Supportive therapy also discussed. Follow up if symptoms fail to improve or worsen.     3-5) Routine labs in process. Patient will follow up in the next couple months for her annual med check, prescription renewal, and lab review.     6,7) Meds renewed    8) Will have her start an antihistamine regimen. Follow up if symptoms fail to improve or worsen.     Patient Instructions   Ibuprofen 600mg every 6 hours OR naproxen 1-2 tabs every 12 hours    Start the following OTC medications for a possible allergy:  Zyrtec 20mg in the morning x2 weeks  Benadryl 50mg at bedtime x2 weeks      Ordering of each unique test  Prescription drug management       BMI:   Estimated body mass index is 27.88 kg/m  as calculated from the following:    Height as of this encounter: 1.562 m (5' 1.5\").    Weight as of this encounter: 68 kg (150 lb).     Return for your annual med check, lab review - next " "available.     PURA [facility] [place] Long is a 57 year old, presenting for the following health issues:  Musculoskeletal Problem        7/31/2023    10:23 AM   Additional Questions   Roomed by Shonda MA   Accompanied by Son       RAVI     Concern - Leg Pain    Onset: 3-4 months  Description: Left Leg Pain, also her knees have been bothering her as well  Intensity: severe  Progression of Symptoms: same  Accompanying Signs & Symptoms: joint pain  Previous history of similar problem: none  Precipitating factors:        Worsened by: stairs, sitting down and standing up, having a hard time standing up on her own  Alleviating factors:        Improved by: none  Therapies tried and outcome: Advil helps a little    Left hip/groin pain x3-4 months ago  No specific injury  Occurs when trying to stand from a squat position   Knee pain when walking up the stairs         Review of Systems   Constitutional, musculoskeletal, neuro, skin systems are negative, except as otherwise noted.      Objective    /68 (BP Location: Right arm, Patient Position: Chair, Cuff Size: Adult Regular)   Pulse 85   Temp 97.4  F (36.3  C) (Tympanic)   Ht 1.562 m (5' 1.5\")   Wt 68 kg (150 lb)   LMP  (LMP Unknown)   SpO2 98%   BMI 27.88 kg/m    Body mass index is 27.88 kg/m .  Physical Exam                   "

## 2023-07-31 NOTE — PATIENT INSTRUCTIONS
Ibuprofen 600mg every 6 hours OR naproxen 1-2 tabs every 12 hours    Start the following OTC medications for a possible allergy:  Zyrtec 20mg in the morning x2 weeks  Benadryl 50mg at bedtime x2 weeks

## 2023-08-01 ENCOUNTER — MYC MEDICAL ADVICE (OUTPATIENT)
Dept: FAMILY MEDICINE | Facility: CLINIC | Age: 58
End: 2023-08-01
Payer: COMMERCIAL

## 2023-08-01 LAB
CREAT UR-MCNC: 107 MG/DL
MICROALBUMIN UR-MCNC: 44.3 MG/L
MICROALBUMIN/CREAT UR: 41.4 MG/G CR (ref 0–25)

## 2023-08-01 PROCEDURE — 82570 ASSAY OF URINE CREATININE: CPT | Performed by: PHYSICIAN ASSISTANT

## 2023-08-01 PROCEDURE — 82043 UR ALBUMIN QUANTITATIVE: CPT | Performed by: PHYSICIAN ASSISTANT

## 2023-08-03 NOTE — TELEPHONE ENCOUNTER
I called   Services, placed call to patient with assistance of Namibian  #[phone].    No answer,  left message requesting patient call back to BE nurse at [phone] for message from her provider.    I see we do have consent to communicate with son Juan Jose and daughter in law Pineda if needed.    Await call back from patient or family.    Angélica DUKE RN  [facility] Triage

## 2023-08-03 NOTE — TELEPHONE ENCOUNTER
Alert received that patient has not read her [employer] message regarding A1C.    Mercedes,  Your A1c is quite high at 9.5%! This means your diabetes is not well controlled and your blood sugars are very high.   Are you taking your Metformin?  Laura    Routed to team to reach out to patient.  Requires Romanian .    Angélica DUKE RN  [facility] Triage

## 2023-08-04 NOTE — TELEPHONE ENCOUNTER
I called   Services, placed call to patient with assistance of Turkish  #[id number].    It does not appear patient has read any mychart message in her record.   Does she want to use Mychart or should we deactivate it?     called patient, no answer, left message on voicemail; patient was instructed to return call to [facility] at [phone].    Angélica DUKE RN  [facility] Triage

## 2023-08-07 NOTE — TELEPHONE ENCOUNTER
I called   Services, placed call to patient with assistance of Latvian  #[phone].    Attempted to call patient with  at home/mobile number, no answer, left message on voicemail; patient was instructed to return call to [facility] at [phone].    Angélica DUKE RN  [facility] Triage

## 2023-08-08 NOTE — TELEPHONE ENCOUNTER
No response from patient after 3 calls out.    Will try family, we have consent to communicate.    Attempted to call patient's sonanna marie Ingram at [phone]  number, no answer, left message on voicemail; Juan Jose was instructed to return call to [facility] at [phone].    Angélica DUKE RN  [facility] Triage

## 2023-08-17 DIAGNOSIS — I10 HYPERTENSION, GOAL BELOW 140/90: ICD-10-CM

## 2023-08-18 RX ORDER — AMLODIPINE BESYLATE 10 MG/1
10 TABLET ORAL DAILY
Qty: 90 TABLET | Refills: 0 | Status: SHIPPED | OUTPATIENT
Start: 2023-08-18 | End: 2023-11-17

## 2023-08-30 ENCOUNTER — PATIENT OUTREACH (OUTPATIENT)
Dept: CARE COORDINATION | Facility: CLINIC | Age: 58
End: 2023-08-30
Payer: COMMERCIAL

## 2023-09-27 ENCOUNTER — PATIENT OUTREACH (OUTPATIENT)
Dept: CARE COORDINATION | Facility: CLINIC | Age: 58
End: 2023-09-27
Payer: COMMERCIAL

## 2023-10-07 ENCOUNTER — HEALTH MAINTENANCE LETTER (OUTPATIENT)
Age: 58
End: 2023-10-07

## 2023-10-30 DIAGNOSIS — E11.42 TYPE 2 DIABETES MELLITUS WITH DIABETIC POLYNEUROPATHY, WITHOUT LONG-TERM CURRENT USE OF INSULIN (H): ICD-10-CM

## 2023-10-30 RX ORDER — LISINOPRIL 20 MG/1
20 TABLET ORAL DAILY
Qty: 90 TABLET | Refills: 0 | Status: SHIPPED | OUTPATIENT
Start: 2023-10-30 | End: 2024-02-26

## 2023-11-11 DIAGNOSIS — E78.5 HYPERLIPIDEMIA LDL GOAL <100: ICD-10-CM

## 2023-11-13 RX ORDER — SIMVASTATIN 20 MG
20 TABLET ORAL AT BEDTIME
Qty: 90 TABLET | Refills: 0 | Status: SHIPPED | OUTPATIENT
Start: 2023-11-13 | End: 2024-02-09

## 2023-11-15 DIAGNOSIS — I10 HYPERTENSION, GOAL BELOW 140/90: ICD-10-CM

## 2023-11-17 RX ORDER — AMLODIPINE BESYLATE 10 MG/1
10 TABLET ORAL DAILY
Qty: 90 TABLET | Refills: 2 | Status: SHIPPED | OUTPATIENT
Start: 2023-11-17 | End: 2024-02-09

## 2023-12-07 DIAGNOSIS — G47.00 INSOMNIA, UNSPECIFIED TYPE: ICD-10-CM

## 2023-12-07 DIAGNOSIS — K21.9 GASTROESOPHAGEAL REFLUX DISEASE WITHOUT ESOPHAGITIS: ICD-10-CM

## 2023-12-16 ENCOUNTER — HEALTH MAINTENANCE LETTER (OUTPATIENT)
Age: 58
End: 2023-12-16

## 2024-01-11 ENCOUNTER — TELEPHONE (OUTPATIENT)
Dept: FAMILY MEDICINE | Facility: CLINIC | Age: 59
End: 2024-01-11

## 2024-01-11 NOTE — TELEPHONE ENCOUNTER
Patient Quality Outreach    Patient is due for the following:   Diabetes -  A1C, Eye Exam, Diabetic Follow-Up Visit, and Foot Exam  Breast Cancer Screening - Mammogram  Cervical Cancer Screening - PAP Needed-due in May 2024  Physical Preventive Adult Physical      Topic Date Due    Zoster (Shingles) Vaccine (1 of 2) Never done    Flu Vaccine (1) 09/01/2023    COVID-19 Vaccine (3 - 2023-24 season) 09/01/2023         Type of outreach:    Sent [email or web address] message.regarding physical, pap, mammo    Patient has appointment 2/26/24 with EK-added above to appointment notes as well  Questions for provider review:    None           Michelle Infante MA  Chart routed to Care Team.

## 2024-02-09 DIAGNOSIS — E78.5 HYPERLIPIDEMIA LDL GOAL <100: ICD-10-CM

## 2024-02-09 DIAGNOSIS — I10 HYPERTENSION, GOAL BELOW 140/90: ICD-10-CM

## 2024-02-09 RX ORDER — SIMVASTATIN 20 MG
20 TABLET ORAL AT BEDTIME
Qty: 90 TABLET | Refills: 0 | Status: SHIPPED | OUTPATIENT
Start: 2024-02-09 | End: 2024-02-26

## 2024-02-09 RX ORDER — AMLODIPINE BESYLATE 10 MG/1
10 TABLET ORAL DAILY
Qty: 90 TABLET | Refills: 2 | Status: SHIPPED | OUTPATIENT
Start: 2024-02-09

## 2024-02-26 ENCOUNTER — OFFICE VISIT (OUTPATIENT)
Dept: FAMILY MEDICINE | Facility: CLINIC | Age: 59
End: 2024-02-26
Payer: COMMERCIAL

## 2024-02-26 VITALS
HEART RATE: 98 BPM | HEIGHT: 61 IN | DIASTOLIC BLOOD PRESSURE: 84 MMHG | BODY MASS INDEX: 27.41 KG/M2 | WEIGHT: 145.2 LBS | RESPIRATION RATE: 20 BRPM | OXYGEN SATURATION: 96 % | SYSTOLIC BLOOD PRESSURE: 108 MMHG | TEMPERATURE: 97.4 F

## 2024-02-26 DIAGNOSIS — I10 HYPERTENSION, GOAL BELOW 140/90: Chronic | ICD-10-CM

## 2024-02-26 DIAGNOSIS — E11.42 TYPE 2 DIABETES MELLITUS WITH DIABETIC POLYNEUROPATHY, WITHOUT LONG-TERM CURRENT USE OF INSULIN (H): Primary | ICD-10-CM

## 2024-02-26 DIAGNOSIS — K21.9 GASTROESOPHAGEAL REFLUX DISEASE WITHOUT ESOPHAGITIS: ICD-10-CM

## 2024-02-26 DIAGNOSIS — Z12.31 VISIT FOR SCREENING MAMMOGRAM: ICD-10-CM

## 2024-02-26 DIAGNOSIS — R79.89 ELEVATED LFTS: ICD-10-CM

## 2024-02-26 DIAGNOSIS — E78.5 HYPERLIPIDEMIA LDL GOAL <100: ICD-10-CM

## 2024-02-26 DIAGNOSIS — Z12.4 CERVICAL CANCER SCREENING: ICD-10-CM

## 2024-02-26 DIAGNOSIS — G47.00 INSOMNIA, UNSPECIFIED TYPE: ICD-10-CM

## 2024-02-26 LAB — HBA1C MFR BLD: 11.5 % (ref 0–5.6)

## 2024-02-26 PROCEDURE — 80053 COMPREHEN METABOLIC PANEL: CPT | Performed by: PHYSICIAN ASSISTANT

## 2024-02-26 PROCEDURE — 82043 UR ALBUMIN QUANTITATIVE: CPT | Performed by: PHYSICIAN ASSISTANT

## 2024-02-26 PROCEDURE — 82977 ASSAY OF GGT: CPT | Performed by: PHYSICIAN ASSISTANT

## 2024-02-26 PROCEDURE — 99214 OFFICE O/P EST MOD 30 MIN: CPT | Performed by: PHYSICIAN ASSISTANT

## 2024-02-26 PROCEDURE — 36415 COLL VENOUS BLD VENIPUNCTURE: CPT | Performed by: PHYSICIAN ASSISTANT

## 2024-02-26 PROCEDURE — 82570 ASSAY OF URINE CREATININE: CPT | Performed by: PHYSICIAN ASSISTANT

## 2024-02-26 PROCEDURE — 83036 HEMOGLOBIN GLYCOSYLATED A1C: CPT | Performed by: PHYSICIAN ASSISTANT

## 2024-02-26 PROCEDURE — 99207 PR FOOT EXAM NO CHARGE: CPT | Performed by: PHYSICIAN ASSISTANT

## 2024-02-26 PROCEDURE — G0145 SCR C/V CYTO,THINLAYER,RESCR: HCPCS | Performed by: PHYSICIAN ASSISTANT

## 2024-02-26 PROCEDURE — 87624 HPV HI-RISK TYP POOLED RSLT: CPT | Performed by: PHYSICIAN ASSISTANT

## 2024-02-26 RX ORDER — LANCETS
EACH MISCELLANEOUS
Qty: 100 EACH | Refills: 6 | Status: SHIPPED | OUTPATIENT
Start: 2024-02-26

## 2024-02-26 RX ORDER — LISINOPRIL 20 MG/1
20 TABLET ORAL DAILY
Qty: 90 TABLET | Refills: 3 | Status: SHIPPED | OUTPATIENT
Start: 2024-02-26

## 2024-02-26 RX ORDER — SIMVASTATIN 20 MG
20 TABLET ORAL AT BEDTIME
Qty: 90 TABLET | Refills: 3 | Status: SHIPPED | OUTPATIENT
Start: 2024-02-26

## 2024-02-26 RX ORDER — METFORMIN HCL 500 MG
500 TABLET, EXTENDED RELEASE 24 HR ORAL
Qty: 90 TABLET | Refills: 0 | Status: SHIPPED | OUTPATIENT
Start: 2024-02-26 | End: 2024-02-27

## 2024-02-26 NOTE — PROGRESS NOTES
"  Assessment & Plan       ICD-10-CM    1. Type 2 diabetes mellitus with diabetic polyneuropathy, without long-term current use of insulin (H)  E11.42 Adult Eye  Referral     HEMOGLOBIN A1C     lisinopril (ZESTRIL) 20 MG tablet     Comprehensive metabolic panel     metFORMIN (GLUCOPHAGE XR) 500 MG 24 hr tablet     blood glucose monitoring (NO BRAND SPECIFIED) meter device kit     blood glucose (NO BRAND SPECIFIED) test strip     thin (NO BRAND SPECIFIED) lancets     FOOT EXAM     HEMOGLOBIN A1C     Comprehensive metabolic panel      2. Hyperlipidemia LDL goal <100  E78.5 simvastatin (ZOCOR) 20 MG tablet      3. Elevated LFTs  R79.89 GGT     Comprehensive metabolic panel     GGT     Comprehensive metabolic panel      4. Hypertension, goal below 140/90  I10 Albumin Random Urine Quantitative with Creat Ratio     Albumin Random Urine Quantitative with Creat Ratio      5. Gastroesophageal reflux disease without esophagitis  K21.9 omeprazole (PRILOSEC) 20 MG DR capsule      6. Insomnia, unspecified type  G47.00 amitriptyline (ELAVIL) 25 MG tablet      7. Visit for screening mammogram  Z12.31 MA SCREENING DIGITAL BILAT - Future  (s+30)      8. Cervical cancer screening  Z12.4 Pap Screen with HPV - recommended age 30 - 65 years          1-4) Blood pressure at goal. Routine labs in process. Follow up pending A1c result. Meds renewed and she will be restarting Metformin XR 500mg daily.     5,6) Meds renewed, no change    Screenings discussed    Patient Instructions   1) Schedule your diabetic eye exam  2) Schedule your mammogram      Ordering of each unique test  Prescription drug management    BMI  Estimated body mass index is 26.99 kg/m  as calculated from the following:    Height as of this encounter: 1.562 m (5' 1.5\").    Weight as of this encounter: 65.9 kg (145 lb 3.2 oz).     Return for follow up pending lab and/or imaging results.        Arnel Long is a 58 year old, presenting for the following health " "issues:  cramps on the legs and Recheck Medication        2/26/2024     4:54 PM   Additional Questions   Roomed by Padmaja   Accompanied by son         2/26/2024     4:54 PM   Patient Reported Additional Medications   Patient reports taking the following new medications none     History of Present Illness       Diabetes:   She presents for follow up of diabetes.    She is not checking blood glucose.     She is aware of hypoglycemia symptoms including none.    She has no concerns regarding her diabetes at this time.   She is not experiencing numbness or burning in feet, excessive thirst, blurry vision, weight changes or redness, sores or blisters on feet. The patient has not had a diabetic eye exam in the last 12 months.          Hyperlipidemia:  She presents for follow up of hyperlipidemia.   She is taking medication to lower cholesterol. She is not having myalgia or other side effects to statin medications.    Reason for visit:  Legs bilateral but mostly left  and left arm pain at night and aches during the day  Symptom onset:  More than a month  Symptoms include:  Pain  Symptom intensity:  Severe  Symptom progression:  Worsening  Had these symptoms before:  Yes  Has tried/received treatment for these symptoms:  No    She eats 4 or more servings of fruits and vegetables daily.She exercises with enough effort to increase her heart rate 10 to 19 minutes per day.  She exercises with enough effort to increase her heart rate 7 days per week.   She is taking medications regularly.           Review of Systems  Constitutional, neuro, endocrine, cardiac, integument systems are negative, except as otherwise noted.      Objective    /84   Pulse 98   Temp 97.4  F (36.3  C) (Temporal)   Resp 20   Ht 1.562 m (5' 1.5\")   Wt 65.9 kg (145 lb 3.2 oz)   LMP  (LMP Unknown)   SpO2 96%   BMI 26.99 kg/m    Body mass index is 26.99 kg/m .  Physical Exam   GENERAL: alert and no distress  EYES: Eyes grossly normal to " inspection  HENT: ear canals and TM's normal, nose and mouth without ulcers or lesions  NECK: no adenopathy, no asymmetry, masses, or scars  RESP: lungs clear to auscultation - no rales, rhonchi or wheezes  BREAST: normal without masses, tenderness or nipple discharge and no palpable axillary masses or adenopathy  CV: regular rates and rhythm, normal S1 S2, no S3 or S4, and no murmur, click or rub  ABDOMEN: soft, nontender, no hepatosplenomegaly, no masses and bowel sounds normal   (female) w/bimanual: normal female external genitalia, normal urethral meatus, normal vaginal mucosa, and normal cervix  MS: no gross musculoskeletal defects noted, no edema  SKIN: no suspicious lesions or rashes  NEURO: Normal strength and tone, mentation intact and speech normal  PSYCH: mentation appears normal, affect normal/bright          Signed Electronically by: Laura Bentley PA-C

## 2024-02-27 ENCOUNTER — TELEPHONE (OUTPATIENT)
Dept: FAMILY MEDICINE | Facility: CLINIC | Age: 59
End: 2024-02-27
Payer: COMMERCIAL

## 2024-02-27 DIAGNOSIS — R74.8 ELEVATED SERUM GGT LEVEL: Primary | ICD-10-CM

## 2024-02-27 DIAGNOSIS — E11.42 TYPE 2 DIABETES MELLITUS WITH DIABETIC POLYNEUROPATHY, WITHOUT LONG-TERM CURRENT USE OF INSULIN (H): ICD-10-CM

## 2024-02-27 DIAGNOSIS — R79.89 ELEVATED LFTS: ICD-10-CM

## 2024-02-27 LAB
ALBUMIN SERPL BCG-MCNC: 4.5 G/DL (ref 3.5–5.2)
ALP SERPL-CCNC: 175 U/L (ref 40–150)
ALT SERPL W P-5'-P-CCNC: 61 U/L (ref 0–50)
ANION GAP SERPL CALCULATED.3IONS-SCNC: 11 MMOL/L (ref 7–15)
AST SERPL W P-5'-P-CCNC: 46 U/L (ref 0–45)
BILIRUB SERPL-MCNC: 0.4 MG/DL
BUN SERPL-MCNC: 10.8 MG/DL (ref 8–23)
CALCIUM SERPL-MCNC: 9.3 MG/DL (ref 8.8–10.2)
CHLORIDE SERPL-SCNC: 99 MMOL/L (ref 98–107)
CREAT SERPL-MCNC: 0.48 MG/DL (ref 0.51–0.95)
CREAT UR-MCNC: 33.9 MG/DL
DEPRECATED HCO3 PLAS-SCNC: 27 MMOL/L (ref 22–29)
EGFRCR SERPLBLD CKD-EPI 2021: >90 ML/MIN/1.73M2
GGT SERPL-CCNC: 46 U/L (ref 5–36)
GLUCOSE SERPL-MCNC: 325 MG/DL (ref 70–99)
MICROALBUMIN UR-MCNC: 54.7 MG/L
MICROALBUMIN/CREAT UR: 161.36 MG/G CR (ref 0–25)
POTASSIUM SERPL-SCNC: 4.1 MMOL/L (ref 3.4–5.3)
PROT SERPL-MCNC: 8.1 G/DL (ref 6.4–8.3)
SODIUM SERPL-SCNC: 137 MMOL/L (ref 135–145)

## 2024-02-27 RX ORDER — METFORMIN HCL 500 MG
TABLET, EXTENDED RELEASE 24 HR ORAL
Qty: 90 TABLET | Refills: 0 | Status: SHIPPED | OUTPATIENT
Start: 2024-02-27 | End: 2024-04-01

## 2024-02-27 RX ORDER — GLIPIZIDE 5 MG/1
5 TABLET, FILM COATED, EXTENDED RELEASE ORAL DAILY
Qty: 90 TABLET | Refills: 0 | Status: SHIPPED | OUTPATIENT
Start: 2024-02-27 | End: 2024-05-20

## 2024-02-27 NOTE — LETTER
March 5, 2024      Mercedes Long  [address] [place] [address]        Dear ,    We are writing to inform you of your test results. We have tried to reach you by telephone and have been unsuccessful.     Your A1c is up to 11.5%! Your diabetes is not well controlled.   I'd like you to do the following with your Metformin:  1 tab daily x1 week then increase to 2 tabs daily x1 week then increase to 3 tabs daily.  Start Glipizide XR 5mg once daily.      You need to follow up with diabetic education - new referral placed.     Your LFTs and GGT levels are also elevated indicating something could be going on with your liver. I'd like to do two things:  1) Obtain an abdominal US - order placed  2) Have you see GI/liver - referral placed    Resulted Orders   HEMOGLOBIN A1C   Result Value Ref Range    Hemoglobin A1C 11.5 (H) 0.0 - 5.6 %      Comment:      Normal <5.7%   Prediabetes 5.7-6.4%    Diabetes 6.5% or higher     Note: Adopted from ADA consensus guidelines.   GGT   Result Value Ref Range    GGT 46 (H) 5 - 36 U/L   Comprehensive metabolic panel   Result Value Ref Range    Sodium 137 135 - 145 mmol/L      Comment:      Reference intervals for this test were updated on 09/26/2023 to more accurately reflect our healthy population. There may be differences in the flagging of prior results with similar values performed with this method. Interpretation of those prior results can be made in the context of the updated reference intervals.     Potassium 4.1 3.4 - 5.3 mmol/L    Carbon Dioxide (CO2) 27 22 - 29 mmol/L    Anion Gap 11 7 - 15 mmol/L    Urea Nitrogen 10.8 8.0 - 23.0 mg/dL    Creatinine 0.48 (L) 0.51 - 0.95 mg/dL    GFR Estimate >90 >60 mL/min/1.73m2    Calcium 9.3 8.8 - 10.2 mg/dL    Chloride 99 98 - 107 mmol/L    Glucose 325 (H) 70 - 99 mg/dL    Alkaline Phosphatase 175 (H) 40 - 150 U/L      Comment:      Reference intervals for this test were updated on 11/14/2023 to more accurately reflect our  healthy population. There may be differences in the flagging of prior results with similar values performed with this method. Interpretation of those prior results can be made in the context of the updated reference intervals.    AST 46 (H) 0 - 45 U/L      Comment:      Reference intervals for this test were updated on 6/12/2023 to more accurately reflect our healthy population. There may be differences in the flagging of prior results with similar values performed with this method. Interpretation of those prior results can be made in the context of the updated reference intervals.    ALT 61 (H) 0 - 50 U/L      Comment:      Reference intervals for this test were updated on 6/12/2023 to more accurately reflect our healthy population. There may be differences in the flagging of prior results with similar values performed with this method. Interpretation of those prior results can be made in the context of the updated reference intervals.      Protein Total 8.1 6.4 - 8.3 g/dL    Albumin 4.5 3.5 - 5.2 g/dL    Bilirubin Total 0.4 <=1.2 mg/dL   Albumin Random Urine Quantitative with Creat Ratio   Result Value Ref Range    Creatinine Urine mg/dL 33.9 mg/dL      Comment:      The reference ranges have not been established in urine creatinine. The results should be integrated into the clinical context for interpretation.    Albumin Urine mg/L 54.7 mg/L      Comment:      The reference ranges have not been established in urine albumin. The results should be integrated into the clinical context for interpretation.    Albumin Urine mg/g Cr 161.36 (H) 0.00 - 25.00 mg/g Cr      Comment:      Microalbuminuria is defined as an albumin:creatinine ratio of 17 to 299 for males and 25 to 299 for females. A ratio of albumin:creatinine of 300 or higher is indicative of overt proteinuria.  Due to biologic variability, positive results should be confirmed by a second, first-morning random or 24-hour timed urine specimen. If there is  discrepancy, a third specimen is recommended. When 2 out of 3 results are in the microalbuminuria range, this is evidence for incipient nephropathy and warrants increased efforts at glucose control, blood pressure control, and institution of therapy with an angiotensin-converting-enzyme (ACE) inhibitor (if the patient can tolerate it).         If you have any questions or concerns, please call the clinic at the number listed above.   Sincerely,      Laura Bentley PA-C

## 2024-02-27 NOTE — TELEPHONE ENCOUNTER
Please call patient with the following info:    Her A1c is up to 11.5%! Her diabetes is not well controlled.   I'd like her to do the following with her Metformin:  1 tab daily x1 week then increase to 2 tabs daily x1 week then increase to 3 tabs daily.  Start Glipizide XR 5mg once daily.     She needs to follow up with diabetic education - new referral placed    Her LFTs and GGT levels are also elevated indicating something could be going on with her liver. I'd like to do two things:  1) Obtain an abdominal US - order placed  2) Have her see GI/liver - referral placed

## 2024-02-27 NOTE — TELEPHONE ENCOUNTER
RN left message to return call to clinic [phone].  (RN did not leave specific details on voicemail for confidential reasons)      Elif Patricia RN on 2/27/2024 at 10:00 AM

## 2024-02-29 NOTE — TELEPHONE ENCOUNTER
Attempt #2 to call patient.     RN left voicemail and requested return call to [facility] at [phone]    Indiana Riggins RN, BSN  [facility]: [place]

## 2024-03-01 NOTE — TELEPHONE ENCOUNTER
Attempted to call patient with number on file to relay provider's message below with no answer, left voicemail to call clinic back at [phone].    Kanika Banda RN on 3/1/2024 at 11:10 AM

## 2024-03-03 LAB
BKR LAB AP GYN ADEQUACY: NORMAL
BKR LAB AP GYN INTERPRETATION: NORMAL
BKR LAB AP HPV REFLEX: NORMAL
BKR LAB AP PREVIOUS ABNORMAL: NORMAL
PATH REPORT.COMMENTS IMP SPEC: NORMAL
PATH REPORT.COMMENTS IMP SPEC: NORMAL
PATH REPORT.RELEVANT HX SPEC: NORMAL

## 2024-03-04 NOTE — TELEPHONE ENCOUNTER
With the assistance of a Divehi , message left for patient to call the clinic at [phone] and ask for a Triage Nurse.     Blessing Jimenez RN BSN  [facility]

## 2024-03-05 LAB
HUMAN PAPILLOMA VIRUS 16 DNA: NEGATIVE
HUMAN PAPILLOMA VIRUS 18 DNA: NEGATIVE
HUMAN PAPILLOMA VIRUS FINAL DIAGNOSIS: NORMAL
HUMAN PAPILLOMA VIRUS OTHER HR: NEGATIVE

## 2024-04-01 DIAGNOSIS — E11.42 TYPE 2 DIABETES MELLITUS WITH DIABETIC POLYNEUROPATHY, WITHOUT LONG-TERM CURRENT USE OF INSULIN (H): ICD-10-CM

## 2024-04-01 RX ORDER — METFORMIN HCL 500 MG
TABLET, EXTENDED RELEASE 24 HR ORAL
Qty: 90 TABLET | Refills: 0 | Status: SHIPPED | OUTPATIENT
Start: 2024-04-01 | End: 2024-05-20

## 2024-04-15 ENCOUNTER — TELEPHONE (OUTPATIENT)
Dept: FAMILY MEDICINE | Facility: CLINIC | Age: 59
End: 2024-04-15

## 2024-04-15 ENCOUNTER — ANCILLARY PROCEDURE (OUTPATIENT)
Dept: MAMMOGRAPHY | Facility: CLINIC | Age: 59
End: 2024-04-15
Attending: PHYSICIAN ASSISTANT
Payer: COMMERCIAL

## 2024-04-15 ENCOUNTER — ANCILLARY PROCEDURE (OUTPATIENT)
Dept: ULTRASOUND IMAGING | Facility: CLINIC | Age: 59
End: 2024-04-15
Attending: PHYSICIAN ASSISTANT
Payer: COMMERCIAL

## 2024-04-15 DIAGNOSIS — K83.8 DILATED BILE DUCT: Primary | ICD-10-CM

## 2024-04-15 DIAGNOSIS — R79.89 ELEVATED LFTS: ICD-10-CM

## 2024-04-15 DIAGNOSIS — R74.8 ELEVATED SERUM GGT LEVEL: ICD-10-CM

## 2024-04-15 DIAGNOSIS — Z12.31 VISIT FOR SCREENING MAMMOGRAM: ICD-10-CM

## 2024-04-15 DIAGNOSIS — K80.20 GALLSTONES: ICD-10-CM

## 2024-04-15 PROCEDURE — 76705 ECHO EXAM OF ABDOMEN: CPT | Mod: TC | Performed by: RADIOLOGY

## 2024-04-15 PROCEDURE — 77067 SCR MAMMO BI INCL CAD: CPT | Mod: TC | Performed by: STUDENT IN AN ORGANIZED HEALTH CARE EDUCATION/TRAINING PROGRAM

## 2024-04-15 NOTE — LETTER
"April 19, 2024      Mercedes Long  [address] [place] [address]        Dear Mercedes,         We have been trying to connect with you regarding your ultrasound and have not been successful.     Per your provider:    \"Ultrasound shows gallstones and mild dilatation of the bile duct - this could be related to a stone passing. Radiology recommends doing an MRCP (MRI), order placed  Moderate fatty liver. I recommend she our nutritionist if she's agreeable. Can place a referral if needed\"        Please call the nurse line with any questions. [phone].          Thank you for choosing us your partner in health care.     Your [facility] Care Team          "

## 2024-04-15 NOTE — TELEPHONE ENCOUNTER
Please call patient with the following info:    Ultrasound shows gallstones and mild dilatation of the bile duct - this could be related to a stone passing. Radiology recommends doing an MRCP (MRI), order placed  Moderate fatty liver. I recommend she our nutritionist if she's agreeable. Can place a referral if needed

## 2024-04-16 NOTE — TELEPHONE ENCOUNTER
"Attempted to call patient with Qatari  to relay pcp's message below with no answer, left voicemail to call clinic back at [phone].    Per comments in demographics: \"Please contact shonna with results etc Home is son, Juan Jose Work is son, Ji Mobile is Mercedes.\" No consent to communicate with Shonna or Ji. Juan Jose is on consent to communicate, will need to reach out to EC if pt does not call back.     Kanika Banda RN on 4/16/2024 at 9:30 AM    "

## 2024-04-17 ENCOUNTER — APPOINTMENT (OUTPATIENT)
Dept: INTERPRETER SERVICES | Facility: CLINIC | Age: 59
End: 2024-04-17
Payer: COMMERCIAL

## 2024-04-17 NOTE — TELEPHONE ENCOUNTER
Called patient with assistance of Surinamese  and left a message to return our call to the clinic.     Irina Lozano RN

## 2024-04-19 NOTE — TELEPHONE ENCOUNTER
Attempted to call, left vm for Juan Jose asking to please have pt call us or he can call us back regarding recent imaging.     Team- can you please print cued letter and mail to address on file. Ok to close out the encounter once complete.     Thanks,  UMAIR Burgess  [facility]

## 2024-05-07 DIAGNOSIS — E11.42 TYPE 2 DIABETES MELLITUS WITH DIABETIC POLYNEUROPATHY, WITHOUT LONG-TERM CURRENT USE OF INSULIN (H): ICD-10-CM

## 2024-05-07 NOTE — TELEPHONE ENCOUNTER
Please call patient to find out what dose she is currently taking then forward to PCP for review.     Laura Rebolledo, BRAYDENN, RN   [facility]

## 2024-05-07 NOTE — LETTER
May 10, 2024      Mercedes Long  [address]  [place] [address]        Dear Mercedes,     We have tried multiple times to reach you but have been unsuccessful.  Please call our clinic at phone [phone] as soon as possible and ask to speak with any available triage nurse to clarify your medication refill.      Sincerely,      [facility]

## 2024-05-07 NOTE — TELEPHONE ENCOUNTER
Attempted to call patient with number on file to relay message below with no answer, left voicemail to call clinic back at [phone].    When pt returns call, please ask pt what dose of Metformin she is on now.     Kanika Banda, RN on 5/7/2024 at 12:28 PM

## 2024-05-08 NOTE — TELEPHONE ENCOUNTER
With the assistance of a Polish ,   Attempted to call patient with number on file to relay message below with no answer, left voicemail to call clinic back at [phone].     When pt returns call, please ask pt what dose of Metformin she is on now.        Blessing Jimenez RN BSN  [facility]

## 2024-05-09 RX ORDER — METFORMIN HCL 500 MG
TABLET, EXTENDED RELEASE 24 HR ORAL
Qty: 90 TABLET | Refills: 0 | OUTPATIENT
Start: 2024-05-09

## 2024-05-09 NOTE — TELEPHONE ENCOUNTER
I called   Services, placed call to patient with assistance of Indian  #[phone].    No answer,  left message requesting patient call back to [facility] at [phone].    3 attempts to contact patient have been made, refill refused, routed to TC team to send letter for patient to call us to clarify her medication refill.    Angélica DUKE RN  [facility] Triage

## 2024-05-20 RX ORDER — GLIPIZIDE 5 MG/1
5 TABLET, FILM COATED, EXTENDED RELEASE ORAL DAILY
Qty: 30 TABLET | Refills: 0 | Status: SHIPPED | OUTPATIENT
Start: 2024-05-20 | End: 2024-06-17

## 2024-05-20 RX ORDER — METFORMIN HCL 500 MG
TABLET, EXTENDED RELEASE 24 HR ORAL
Qty: 90 TABLET | Refills: 0 | Status: SHIPPED | OUTPATIENT
Start: 2024-05-20 | End: 2024-06-17

## 2024-05-20 NOTE — TELEPHONE ENCOUNTER
I reviewed the directives from Laura Bentley PA-C with patient's son Juan Jose and he verbalized a good understanding.     Lab visit scheduled on 6/3/24 for A1C recheck.     Patient has not been seen by the Diabetic Educator, however her son wrote down the phone number and plans to call to schedule.     Blessing XIAO  [facility]

## 2024-05-20 NOTE — TELEPHONE ENCOUNTER
Received call from pt's son, Juan Jose (consent to communicate on file). He helps set up patient's medication. He states that he is taking 3 tablets daily with dinner for metformin. She has about  2 days left of medication. He states that he was not aware that this was a medication that she should continue on. He thought that they would be stopping it after completing the Rx. Notified him that this is a medication that patient would continue on to help with control of her blood sugar levels. He verbalized understanding.     Pended Rx with dose instructions updated. Please review/sign or advise.    Danuta Crowell RN

## 2024-05-20 NOTE — TELEPHONE ENCOUNTER
No it's a prescription she will continue to take, Metformin and Glipizide. Refilled.  She's due for a repeat A1c, please assist with scheduling, lab only ok  Also, did she follow up with diabetic ed? If not, she should do so ASAP

## 2024-06-18 DIAGNOSIS — E11.42 TYPE 2 DIABETES MELLITUS WITH DIABETIC POLYNEUROPATHY, WITHOUT LONG-TERM CURRENT USE OF INSULIN (H): ICD-10-CM

## 2024-06-18 RX ORDER — BLOOD-GLUCOSE METER
EACH MISCELLANEOUS
Qty: 1 KIT | Refills: 0 | Status: SHIPPED | OUTPATIENT
Start: 2024-06-18

## 2024-07-13 ENCOUNTER — HEALTH MAINTENANCE LETTER (OUTPATIENT)
Age: 59
End: 2024-07-13

## 2024-07-25 DIAGNOSIS — E11.42 TYPE 2 DIABETES MELLITUS WITH DIABETIC POLYNEUROPATHY, WITHOUT LONG-TERM CURRENT USE OF INSULIN (H): ICD-10-CM

## 2024-07-25 RX ORDER — METFORMIN HCL 500 MG
TABLET, EXTENDED RELEASE 24 HR ORAL
Qty: 30 TABLET | Refills: 0 | OUTPATIENT
Start: 2024-07-25

## 2024-08-14 DIAGNOSIS — E11.42 TYPE 2 DIABETES MELLITUS WITH DIABETIC POLYNEUROPATHY, WITHOUT LONG-TERM CURRENT USE OF INSULIN (H): ICD-10-CM

## 2024-08-14 NOTE — TELEPHONE ENCOUNTER
Very overdue for diabetes follow up and repeat A1c - needs lab only scheduled for A1c before I send in a refill.

## 2024-08-14 NOTE — LETTER
Steven Long,    We have been trying to get a hold of you. Please give us a call!      Kanika Laughlin   Lead    [facility]

## 2024-09-16 RX ORDER — GLIPIZIDE 5 MG/1
TABLET, FILM COATED, EXTENDED RELEASE ORAL
Qty: 30 TABLET | Refills: 0 | OUTPATIENT
Start: 2024-09-16

## 2024-11-09 DIAGNOSIS — I10 HYPERTENSION, GOAL BELOW 140/90: ICD-10-CM

## 2024-11-11 RX ORDER — AMLODIPINE BESYLATE 10 MG/1
10 TABLET ORAL DAILY
Qty: 90 TABLET | Refills: 0 | Status: SHIPPED | OUTPATIENT
Start: 2024-11-11

## 2024-11-30 ENCOUNTER — HEALTH MAINTENANCE LETTER (OUTPATIENT)
Age: 59
End: 2024-11-30

## 2025-02-10 ENCOUNTER — OFFICE VISIT (OUTPATIENT)
Dept: FAMILY MEDICINE | Facility: CLINIC | Age: 60
End: 2025-02-10
Payer: COMMERCIAL

## 2025-02-10 VITALS
SYSTOLIC BLOOD PRESSURE: 128 MMHG | TEMPERATURE: 97.7 F | RESPIRATION RATE: 16 BRPM | BODY MASS INDEX: 26.03 KG/M2 | WEIGHT: 140 LBS | HEART RATE: 89 BPM | DIASTOLIC BLOOD PRESSURE: 74 MMHG | OXYGEN SATURATION: 100 %

## 2025-02-10 DIAGNOSIS — K21.9 GASTROESOPHAGEAL REFLUX DISEASE WITHOUT ESOPHAGITIS: ICD-10-CM

## 2025-02-10 DIAGNOSIS — M25.511 ACUTE PAIN OF RIGHT SHOULDER: ICD-10-CM

## 2025-02-10 DIAGNOSIS — E11.42 TYPE 2 DIABETES MELLITUS WITH DIABETIC POLYNEUROPATHY, WITHOUT LONG-TERM CURRENT USE OF INSULIN (H): Primary | ICD-10-CM

## 2025-02-10 DIAGNOSIS — G47.00 INSOMNIA, UNSPECIFIED TYPE: ICD-10-CM

## 2025-02-10 DIAGNOSIS — E78.5 HYPERLIPIDEMIA LDL GOAL <100: ICD-10-CM

## 2025-02-10 DIAGNOSIS — I10 HYPERTENSION, GOAL BELOW 140/90: ICD-10-CM

## 2025-02-10 LAB
ALBUMIN SERPL BCG-MCNC: 4.1 G/DL (ref 3.5–5.2)
ALP SERPL-CCNC: 194 U/L (ref 40–150)
ALT SERPL W P-5'-P-CCNC: 58 U/L (ref 0–50)
ANION GAP SERPL CALCULATED.3IONS-SCNC: 11 MMOL/L (ref 7–15)
AST SERPL W P-5'-P-CCNC: 43 U/L (ref 0–45)
BILIRUB SERPL-MCNC: 0.5 MG/DL
BUN SERPL-MCNC: 12.4 MG/DL (ref 8–23)
CALCIUM SERPL-MCNC: 9.1 MG/DL (ref 8.8–10.4)
CHLORIDE SERPL-SCNC: 101 MMOL/L (ref 98–107)
CHOLEST SERPL-MCNC: 175 MG/DL
CREAT SERPL-MCNC: 0.45 MG/DL (ref 0.51–0.95)
CREAT UR-MCNC: 68.9 MG/DL
EGFRCR SERPLBLD CKD-EPI 2021: >90 ML/MIN/1.73M2
EST. AVERAGE GLUCOSE BLD GHB EST-MCNC: 324 MG/DL
FASTING STATUS PATIENT QL REPORTED: YES
FASTING STATUS PATIENT QL REPORTED: YES
GLUCOSE SERPL-MCNC: 275 MG/DL (ref 70–99)
HBA1C MFR BLD: 12.9 % (ref 0–5.6)
HCO3 SERPL-SCNC: 25 MMOL/L (ref 22–29)
HDLC SERPL-MCNC: 70 MG/DL
LDLC SERPL CALC-MCNC: 75 MG/DL
MICROALBUMIN UR-MCNC: 43.5 MG/L
MICROALBUMIN/CREAT UR: 63.13 MG/G CR (ref 0–25)
NONHDLC SERPL-MCNC: 105 MG/DL
POTASSIUM SERPL-SCNC: 4.2 MMOL/L (ref 3.4–5.3)
PROT SERPL-MCNC: 7.4 G/DL (ref 6.4–8.3)
SODIUM SERPL-SCNC: 137 MMOL/L (ref 135–145)
TRIGL SERPL-MCNC: 148 MG/DL

## 2025-02-10 PROCEDURE — 82570 ASSAY OF URINE CREATININE: CPT | Performed by: PHYSICIAN ASSISTANT

## 2025-02-10 PROCEDURE — 82043 UR ALBUMIN QUANTITATIVE: CPT | Performed by: PHYSICIAN ASSISTANT

## 2025-02-10 PROCEDURE — 83036 HEMOGLOBIN GLYCOSYLATED A1C: CPT | Performed by: PHYSICIAN ASSISTANT

## 2025-02-10 PROCEDURE — 99214 OFFICE O/P EST MOD 30 MIN: CPT | Performed by: PHYSICIAN ASSISTANT

## 2025-02-10 PROCEDURE — 36415 COLL VENOUS BLD VENIPUNCTURE: CPT | Performed by: PHYSICIAN ASSISTANT

## 2025-02-10 PROCEDURE — 80053 COMPREHEN METABOLIC PANEL: CPT | Performed by: PHYSICIAN ASSISTANT

## 2025-02-10 PROCEDURE — 80061 LIPID PANEL: CPT | Performed by: PHYSICIAN ASSISTANT

## 2025-02-10 RX ORDER — LISINOPRIL 20 MG/1
20 TABLET ORAL DAILY
Qty: 90 TABLET | Refills: 0 | Status: SHIPPED | OUTPATIENT
Start: 2025-02-10

## 2025-02-10 RX ORDER — OMEPRAZOLE 20 MG/1
20 CAPSULE, DELAYED RELEASE ORAL DAILY
Qty: 90 CAPSULE | Refills: 0 | Status: SHIPPED | OUTPATIENT
Start: 2025-02-10

## 2025-02-10 RX ORDER — CYCLOBENZAPRINE HCL 10 MG
10 TABLET ORAL
Qty: 15 TABLET | Refills: 0 | Status: SHIPPED | OUTPATIENT
Start: 2025-02-10

## 2025-02-10 RX ORDER — PREDNISONE 20 MG/1
40 TABLET ORAL DAILY
Qty: 10 TABLET | Refills: 0 | Status: SHIPPED | OUTPATIENT
Start: 2025-02-10 | End: 2025-02-15

## 2025-02-10 RX ORDER — METFORMIN HYDROCHLORIDE 500 MG/1
TABLET, EXTENDED RELEASE ORAL
Qty: 270 TABLET | Refills: 0 | Status: SHIPPED | OUTPATIENT
Start: 2025-02-10

## 2025-02-10 RX ORDER — GLIPIZIDE 5 MG/1
5 TABLET, FILM COATED, EXTENDED RELEASE ORAL DAILY
Qty: 90 TABLET | Refills: 0 | Status: SHIPPED | OUTPATIENT
Start: 2025-02-10

## 2025-02-10 RX ORDER — SIMVASTATIN 20 MG
20 TABLET ORAL AT BEDTIME
Qty: 90 TABLET | Refills: 0 | Status: SHIPPED | OUTPATIENT
Start: 2025-02-10

## 2025-02-10 ASSESSMENT — ENCOUNTER SYMPTOMS
WEAKNESS: 0
SHORTNESS OF BREATH: 0
NUMBNESS: 0
ARTHRALGIAS: 1
FEVER: 0

## 2025-02-10 NOTE — PROGRESS NOTES
Assessment & Plan     Patient is a 59-year-old female who presents to clinic for medication refills and for right shoulder pain.  Patient is overdue for annual exam.  Discussed the importance of scheduling annual exam with PCP as I am same day care provided and do not provide annual exams and patient agreeable.  90-day refills of medications provided.  Discussed the importance of taking diabetes medications as prescribed and reviewed prior A1c's/labs.    Acute pain of right shoulder  Right shoulder pain ongoing for 2-3 months without injury.  Patient works as a  and notes pain in posterior/lateral aspect of shoulder.  History of similar left shoulder pain and subsequent injection with orthopedics.  Vital signs normal.  Low suspicion for significant rotator cuff pathology as strength is intact.  Low suspicion for frozen shoulder given adequate range of motion.  Lower suspicion for cervical radiculopathy as unable to reproduce symptoms with neck range of motion.  Symptoms may be due to OA, tendinopathy, partial tearing of rotator cuff.  Will complete x-rays for further evaluation.  Recommended starting physical therapy.  Will treat symptoms with prednisone and cyclobenzaprine.  Referral placed to orthopedics as patient interested reevaluation with orthopedics and possible injection given success with left shoulder.  - XR Shoulder Right G/E 3 Views; Future  - Orthopedic  Referral; Future  - Physical Therapy  Referral; Future  - predniSONE (DELTASONE) 20 MG tablet; Take 2 tablets (40 mg) by mouth daily for 5 days.  - cyclobenzaprine (FLEXERIL) 10 MG tablet; Take 1 tablet (10 mg) by mouth nightly as needed for muscle spasms.    Type 2 diabetes mellitus with diabetic polyneuropathy, without long-term current use of insulin (H)  - HEMOGLOBIN A1C; Future  - Lipid panel reflex to direct LDL Non-fasting; Future  - Albumin Random Urine Quantitative with Creat Ratio; Future  - glipiZIDE (GLUCOTROL XL)  5 MG 24 hr tablet; Take 1 tablet (5 mg) by mouth daily.  - lisinopril (ZESTRIL) 20 MG tablet; Take 1 tablet (20 mg) by mouth daily.  - metFORMIN (GLUCOPHAGE XR) 500 MG 24 hr tablet; TAKE 3 TABLETS BY MOUTH DAILY WITH DINNER    Hypertension, goal below 140/90  - COMPREHENSIVE METABOLIC PANEL; Future    Insomnia, unspecified type  - amitriptyline (ELAVIL) 25 MG tablet; Take 1 tablet (25 mg) by mouth at bedtime.    Gastroesophageal reflux disease without esophagitis  - omeprazole (PRILOSEC) 20 MG DR capsule; Take 1 capsule (20 mg) by mouth daily.    Hyperlipidemia LDL goal <100  - simvastatin (ZOCOR) 20 MG tablet; Take 1 tablet (20 mg) by mouth at bedtime.      See Patient Instructions    Subjective   Mercedes is a 59 year old, presenting for the following health issues:  Recheck Medication        2/10/2025     8:13 AM   Additional Questions   Roomed by Anamaria   Accompanied by Son   Bulgarian  utilized    History of Present Illness       Hyperlipidemia:  She presents for follow up of hyperlipidemia.   She is taking medication to lower cholesterol. She is not having myalgia or other side effects to statin medications.    Hypertension: She presents for follow up of hypertension.  She does check blood pressure  regularly outside of the clinic. Outpatient blood pressures have not been over 140/90. She follows a low salt diet.     She eats 2-3 servings of fruits and vegetables daily.She consumes 0 sweetened beverage(s) daily.She exercises with enough effort to increase her heart rate 9 or less minutes per day.  She exercises with enough effort to increase her heart rate 3 or less days per week.   She is taking medications regularly.       Patient with history of Diabetes, Hypertension and Hyperlipidemia arrived for Medication Recheck. Patient stopped Glipizide a few  months ago because she ran out of the medication. All other medication are taken as prescribed.  No side effects.    Patient has history of achy left  shoulder pain. She was seen in  for this and referred to orthopedics and treated with steroids and Flexeril.  Right shoulder pain started 2-3 months ago and radiates towards elbow. No injury. Pain is worse with getting dressed. 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 No numbness/tingling/weakness.     BP Readings from Last 2 Encounters:   02/10/25 128/74   02/26/24 108/84     Hemoglobin A1C (%)   Date Value   02/26/2024 11.5 (H)   07/31/2023 9.5 (H)   02/01/2021 7.5 (H)   06/22/2020 7.2 (H)     LDL Cholesterol Calculated (mg/dL)   Date Value   07/31/2023 62   08/22/2022 55   06/22/2020 71   05/13/2019 82         Review of Systems   Constitutional:  Negative for fever.   Respiratory:  Negative for shortness of breath.    Cardiovascular:  Negative for chest pain.   Musculoskeletal:  Positive for arthralgias.   Neurological:  Negative for weakness and numbness.           Objective    /74 (BP Location: Right arm, Patient Position: Chair, Cuff Size: Adult Regular)   Pulse 89   Temp 97.7  F (36.5  C) (Tympanic)   Resp 16   Wt 63.5 kg (140 lb)   LMP  (LMP Unknown)   SpO2 100%   BMI 26.03 kg/m    Body mass index is 26.03 kg/m .  Physical Exam  Vitals and nursing note reviewed.   Constitutional:       General: She is not in acute distress.     Appearance: Normal appearance.   HENT:      Head: Normocephalic and atraumatic.      Mouth/Throat:      Mouth: Mucous membranes are moist.      Pharynx: Oropharynx is clear.   Eyes:      Extraocular Movements: Extraocular movements intact.      Pupils: Pupils are equal, round, and reactive to light.   Cardiovascular:      Rate and Rhythm: Normal rate and regular rhythm.      Heart sounds: Normal heart sounds.   Pulmonary:      Effort: Pulmonary effort is normal.      Breath sounds: Normal breath sounds.   Musculoskeletal:         General: Normal range of motion.      Cervical back: Normal range of motion.      Comments: Shoulders:  Right: Tenderness:  posterior/lateral aspect, Special tests:mild scapular dyskinesis  Left: Tenderness: na, Special tests: na  ROM:  Right: FF/Abduction: 140 with pain, ER: 50, IR: low back with pain  Left: FF/Abduction: 160, ER: 60, IR: low back   Strength:   Right: FF/Abduction: 5/5 with pain, ER: 5/5, IR: 5/5  Left: FF/Abduction: 5/5, ER: 5/5, IR: 5/5     Skin:     General: Skin is warm and dry.   Neurological:      General: No focal deficit present.      Mental Status: She is alert.   Psychiatric:         Mood and Affect: Mood normal.         Behavior: Behavior normal.                  Signed Electronically by: Jeanette Trujillo PA-C

## 2025-02-10 NOTE — PATIENT INSTRUCTIONS
For further evaluation of your shoulder pain we are completing an x-ray today.  I have also placed a referral to orthopedics and scheduling will call you to set up this appointment.  To treat your pain, you have been referred to physical therapy-scheduling will call to set this up.  You have also been prescribed prednisone, a steroid, and Flexeril, a muscle relaxer.  You have been provided with refills of    Glipizide, lisinopril, metformin, amitriptyline, omeprazole, and simvastatin.  We are completing lab work today in preparation for your follow-up visit with Laura, your primary care provider.    You have been scheduled for your yearly physical with Laura, your primary care provider.  Please make sure to attend this appointment.

## 2025-02-11 ENCOUNTER — PATIENT OUTREACH (OUTPATIENT)
Dept: CARE COORDINATION | Facility: CLINIC | Age: 60
End: 2025-02-11
Payer: COMMERCIAL

## 2025-02-13 ENCOUNTER — PATIENT OUTREACH (OUTPATIENT)
Dept: CARE COORDINATION | Facility: CLINIC | Age: 60
End: 2025-02-13
Payer: COMMERCIAL

## 2025-03-03 ENCOUNTER — ANCILLARY PROCEDURE (OUTPATIENT)
Dept: GENERAL RADIOLOGY | Facility: CLINIC | Age: 60
End: 2025-03-03
Attending: PHYSICIAN ASSISTANT
Payer: COMMERCIAL

## 2025-03-03 DIAGNOSIS — M25.511 ACUTE PAIN OF RIGHT SHOULDER: ICD-10-CM

## 2025-03-03 PROCEDURE — 73030 X-RAY EXAM OF SHOULDER: CPT | Mod: TC | Performed by: RADIOLOGY

## 2025-03-07 ENCOUNTER — APPOINTMENT (OUTPATIENT)
Dept: INTERPRETER SERVICES | Facility: CLINIC | Age: 60
End: 2025-03-07
Payer: COMMERCIAL

## 2025-05-27 ENCOUNTER — OFFICE VISIT (OUTPATIENT)
Dept: FAMILY MEDICINE | Facility: CLINIC | Age: 60
End: 2025-05-27
Payer: COMMERCIAL

## 2025-05-27 VITALS
BODY MASS INDEX: 27.64 KG/M2 | WEIGHT: 150.2 LBS | DIASTOLIC BLOOD PRESSURE: 74 MMHG | TEMPERATURE: 98 F | HEART RATE: 92 BPM | OXYGEN SATURATION: 100 % | SYSTOLIC BLOOD PRESSURE: 110 MMHG | HEIGHT: 62 IN | RESPIRATION RATE: 20 BRPM

## 2025-05-27 DIAGNOSIS — R79.89 ELEVATED LFTS: ICD-10-CM

## 2025-05-27 DIAGNOSIS — G47.00 INSOMNIA, UNSPECIFIED TYPE: ICD-10-CM

## 2025-05-27 DIAGNOSIS — I83.813 VARICOSE VEINS OF BOTH LOWER EXTREMITIES WITH PAIN: ICD-10-CM

## 2025-05-27 DIAGNOSIS — E11.42 TYPE 2 DIABETES MELLITUS WITH DIABETIC POLYNEUROPATHY, WITHOUT LONG-TERM CURRENT USE OF INSULIN (H): ICD-10-CM

## 2025-05-27 DIAGNOSIS — Z00.00 ENCOUNTER FOR ROUTINE ADULT HEALTH EXAMINATION WITHOUT ABNORMAL FINDINGS: ICD-10-CM

## 2025-05-27 DIAGNOSIS — R74.8 ELEVATED SERUM GGT LEVEL: ICD-10-CM

## 2025-05-27 DIAGNOSIS — I10 HYPERTENSION, GOAL BELOW 140/90: Primary | ICD-10-CM

## 2025-05-27 DIAGNOSIS — E78.5 HYPERLIPIDEMIA LDL GOAL <100: ICD-10-CM

## 2025-05-27 LAB
ALBUMIN SERPL BCG-MCNC: 4.2 G/DL (ref 3.5–5.2)
ALP SERPL-CCNC: 155 U/L (ref 40–150)
ALT SERPL W P-5'-P-CCNC: 34 U/L (ref 0–50)
AST SERPL W P-5'-P-CCNC: 33 U/L (ref 0–45)
BILIRUB SERPL-MCNC: 0.5 MG/DL
BILIRUBIN DIRECT (ROCHE PRO & PURE): 0.19 MG/DL (ref 0–0.45)
EST. AVERAGE GLUCOSE BLD GHB EST-MCNC: 192 MG/DL
GGT SERPL-CCNC: 29 U/L (ref 5–36)
HBA1C MFR BLD: 8.3 % (ref 0–5.6)
PROT SERPL-MCNC: 7.2 G/DL (ref 6.4–8.3)

## 2025-05-27 PROCEDURE — 80076 HEPATIC FUNCTION PANEL: CPT | Performed by: PHYSICIAN ASSISTANT

## 2025-05-27 PROCEDURE — 1125F AMNT PAIN NOTED PAIN PRSNT: CPT | Performed by: PHYSICIAN ASSISTANT

## 2025-05-27 PROCEDURE — 3078F DIAST BP <80 MM HG: CPT | Performed by: PHYSICIAN ASSISTANT

## 2025-05-27 PROCEDURE — 82977 ASSAY OF GGT: CPT | Performed by: PHYSICIAN ASSISTANT

## 2025-05-27 PROCEDURE — 99396 PREV VISIT EST AGE 40-64: CPT | Performed by: PHYSICIAN ASSISTANT

## 2025-05-27 PROCEDURE — T1013 SIGN LANG/ORAL INTERPRETER: HCPCS

## 2025-05-27 PROCEDURE — 3074F SYST BP LT 130 MM HG: CPT | Performed by: PHYSICIAN ASSISTANT

## 2025-05-27 PROCEDURE — 36415 COLL VENOUS BLD VENIPUNCTURE: CPT | Performed by: PHYSICIAN ASSISTANT

## 2025-05-27 PROCEDURE — 99214 OFFICE O/P EST MOD 30 MIN: CPT | Mod: 25 | Performed by: PHYSICIAN ASSISTANT

## 2025-05-27 PROCEDURE — 99207 PR FOOT EXAM NO CHARGE: CPT | Performed by: PHYSICIAN ASSISTANT

## 2025-05-27 PROCEDURE — 83036 HEMOGLOBIN GLYCOSYLATED A1C: CPT | Performed by: PHYSICIAN ASSISTANT

## 2025-05-27 RX ORDER — AMLODIPINE BESYLATE 10 MG/1
10 TABLET ORAL DAILY
Qty: 90 TABLET | Refills: 3 | Status: SHIPPED | OUTPATIENT
Start: 2025-05-27

## 2025-05-27 RX ORDER — SIMVASTATIN 20 MG
20 TABLET ORAL AT BEDTIME
Qty: 90 TABLET | Refills: 3 | Status: SHIPPED | OUTPATIENT
Start: 2025-05-27

## 2025-05-27 SDOH — HEALTH STABILITY: PHYSICAL HEALTH: ON AVERAGE, HOW MANY DAYS PER WEEK DO YOU ENGAGE IN MODERATE TO STRENUOUS EXERCISE (LIKE A BRISK WALK)?: 3 DAYS

## 2025-05-27 ASSESSMENT — SOCIAL DETERMINANTS OF HEALTH (SDOH): HOW OFTEN DO YOU GET TOGETHER WITH FRIENDS OR RELATIVES?: PATIENT DECLINED

## 2025-05-27 ASSESSMENT — PAIN SCALES - GENERAL: PAINLEVEL_OUTOF10: SEVERE PAIN (8)

## 2025-05-27 NOTE — PROGRESS NOTES
"Preventive Care Visit  [facility] [place] Bentley PA-C, Family Medicine  May 27, 2025      Assessment & Plan       ICD-10-CM    1. Encounter for routine adult health examination without abnormal findings  Z00.00       2. Type 2 diabetes mellitus with diabetic polyneuropathy, without long-term current use of insulin (H)  E11.42 Adult Eye  Referral     HEMOGLOBIN A1C     FOOT EXAM     HEMOGLOBIN A1C      3. Hypertension, goal below 140/90  I10 amLODIPine (NORVASC) 10 MG tablet      4. Hyperlipidemia LDL goal <100  E78.5 simvastatin (ZOCOR) 20 MG tablet      5. Elevated LFTs  R79.89 Hepatic panel (Albumin, ALT, AST, Bili, Alk Phos, TP)     Hepatic panel (Albumin, ALT, AST, Bili, Alk Phos, TP)      6. Elevated serum GGT level  R74.8 GGT     GGT      7. Varicose veins of both lower extremities with pain  I83.813 Compression Sleeve/Stocking Order for DME - ONLY FOR DME      8. Insomnia, unspecified type  G47.00 amitriptyline (ELAVIL) 25 MG tablet          1) Screenings/preventative measures discussed    2-6) Blood pressure at goal. Routine labs in process. Meds renewed, except for Metformin. This was stopped by patient due to s/e. Follow up pending results    7) DME order signed     8) Med renewed      BMI  Estimated body mass index is 27.85 kg/m  as calculated from the following:    Height as of this encounter: 1.564 m (5' 1.58\").    Weight as of this encounter: 68.1 kg (150 lb 3.2 oz).     Return for follow up pending lab and/or imaging results.      Arnel Long is a 59 year old, presenting for the following:  Physical        5/27/2025     8:14 AM   Additional Questions   Roomed by Nelly   Accompanied by n/a          HPI     Diabetes Follow-up    How often are you checking your blood sugar? A few times a month  What time of day are you checking your blood sugars (select all that apply)?  Before meals  Have you had any blood sugars above 200?  Yes   Have you had any blood " sugars below 70?  No  What symptoms do you notice when your blood sugar is low?  Dizzy  What concerns do you have today about your diabetes? Other: discuss medications    Do you have any of these symptoms? (Select all that apply)  Weight gain  Have you had a diabetic eye exam in the last 12 months? No    Stopped Metformin ~6 months ago due to lightheadedness  Lightheadedness went away after stopping it              Hyperlipidemia Follow-Up    Are you regularly taking any medication or supplement to lower your cholesterol?   Yes- simvastatin   Are you having muscle aches or other side effects that you think could be caused by your cholesterol lowering medication?  No    Hypertension Follow-up    Do you check your blood pressure regularly outside of the clinic? Yes   Are you following a low salt diet? Yes  Are your blood pressures ever more than 140 on the top number (systolic) OR more   than 90 on the bottom number (diastolic), for example 140/90? No    BP Readings from Last 2 Encounters:   05/27/25 110/74   02/10/25 128/74     Hemoglobin A1C (%)   Date Value   02/10/2025 12.9 (H)   02/26/2024 11.5 (H)   02/01/2021 7.5 (H)   06/22/2020 7.2 (H)     LDL Cholesterol Calculated (mg/dL)   Date Value   02/10/2025 75   07/31/2023 62   06/22/2020 71   05/13/2019 82       Advance Care Planning    Not discussed        5/27/2025   General Health   How would you rate your overall physical health? Good   Feel stress (tense, anxious, or unable to sleep) Not at all         5/27/2025   Nutrition   Three or more servings of calcium each day? Yes   Diet: Low salt    Low fat/cholesterol   How many servings of fruit and vegetables per day? (!) 2-3   How many sweetened beverages each day? 0-1       Multiple values from one day are sorted in reverse-chronological order         5/27/2025   Exercise   Days per week of moderate/strenous exercise 3 days         5/27/2025   Social Factors   Frequency of gathering with friends or relatives  Patient declined   Worry food won't last until get money to buy more No   Food not last or not have enough money for food? No   Do you have housing? (Housing is defined as stable permanent housing and does not include staying outside in a car, in a tent, in an abandoned building, in an overnight shelter, or couch-surfing.) Yes   Are you worried about losing your housing? No   Lack of transportation? No   Unable to get utilities (heat,electricity)? No         5/27/2025   Fall Risk   Fallen 2 or more times in the past year? Yes   Trouble with walking or balance? No   Gait Speed Test (Document in seconds) 4.56   Gait Speed Test Interpretation Less than or equal to 5.00 seconds - PASS          5/27/2025   Dental   Dentist two times every year? (!) NO               5/27/2025   Substance Use   Alcohol more than 3/day or more than 7/wk No   Do you use any other substances recreationally? No     Social History     Tobacco Use    Smoking status: Never    Smokeless tobacco: Never   Vaping Use    Vaping status: Never Used   Substance Use Topics    Alcohol use: No     Alcohol/week: 0.0 standard drinks of alcohol    Drug use: No           4/15/2024   LAST FHS-7 RESULTS   1st degree relative breast or ovarian cancer No   Any relative bilateral breast cancer No   Any male have breast cancer No   Any ONE woman have BOTH breast AND ovarian cancer No   Any woman with breast cancer before 50yrs No   2 or more relatives with breast AND/OR ovarian cancer No   2 or more relatives with breast AND/OR bowel cancer No        Mammogram Screening - Mammogram every 1-2 years updated in Health Maintenance based on mutual decision making        5/27/2025   STI Screening   New sexual partner(s) since last STI/HIV test? No     History of abnormal Pap smear: No - age 30-64 HPV with reflex Pap every 5 years recommended        Latest Ref Rng & Units 2/26/2024     5:33 PM 5/13/2019     9:30 AM 5/13/2019     9:22 AM   PAP / HPV   PAP  Negative for  "Intraepithelial Lesion or Malignancy (NILM)      PAP (Historical)    NIL    HPV 16 DNA Negative Negative  Negative     HPV 18 DNA Negative Negative  Negative     Other HR HPV Negative Negative  Negative       ASCVD Risk   The 10-year ASCVD risk score (Elisa TEJADA, et al., 2019) is: 5.4%    Values used to calculate the score:      Age: 59 years      Sex: Female      Is Non- : No      Diabetic: Yes      Tobacco smoker: No      Systolic Blood Pressure: 110 mmHg      Is BP treated: Yes      HDL Cholesterol: 70 mg/dL      Total Cholesterol: 175 mg/dL         Reviewed and updated as needed this visit by Provider                    Review of Systems  Constitutional, neuro, ENT, endocrine, pulmonary, cardiac, gastrointestinal, genitourinary, musculoskeletal, integument and psychiatric systems are negative, except as otherwise noted.     Objective    Exam  /74   Pulse 92   Temp 98  F (36.7  C) (Temporal)   Resp 20   Ht 1.564 m (5' 1.58\")   Wt 68.1 kg (150 lb 3.2 oz)   LMP  (LMP Unknown)   SpO2 100%   BMI 27.85 kg/m     Estimated body mass index is 27.85 kg/m  as calculated from the following:    Height as of this encounter: 1.564 m (5' 1.58\").    Weight as of this encounter: 68.1 kg (150 lb 3.2 oz).    Physical Exam  GENERAL: alert and no distress  EYES: Eyes grossly normal to inspection  RESP: lungs clear to auscultation - no rales, rhonchi or wheezes  CV: regular rates and rhythm, normal S1 S2, no S3 or S4, no murmur, click or rub, peripheral pulses strong, no peripheral edema, and varicosities of lower legs  MS: no gross musculoskeletal defects noted, no edema  SKIN: no suspicious lesions or rashes  NEURO: Normal strength and tone, mentation intact and speech normal  PSYCH: mentation appears normal, affect normal/bright  Diabetic foot exam: normal DP and PT pulses, no trophic changes or ulcerative lesions, normal sensory exam, and normal monofilament exam        Signed " Electronically by: Laura Bentley PA-C

## 2025-05-28 ENCOUNTER — RESULTS FOLLOW-UP (OUTPATIENT)
Dept: FAMILY MEDICINE | Facility: CLINIC | Age: 60
End: 2025-05-28

## 2025-05-28 ENCOUNTER — PATIENT OUTREACH (OUTPATIENT)
Dept: CARE COORDINATION | Facility: CLINIC | Age: 60
End: 2025-05-28
Payer: COMMERCIAL

## 2025-05-28 DIAGNOSIS — E11.42 TYPE 2 DIABETES MELLITUS WITH DIABETIC POLYNEUROPATHY, WITHOUT LONG-TERM CURRENT USE OF INSULIN (H): ICD-10-CM

## 2025-05-28 RX ORDER — GLIPIZIDE 10 MG/1
10 TABLET, FILM COATED, EXTENDED RELEASE ORAL DAILY
Qty: 90 TABLET | Refills: 0 | Status: SHIPPED | OUTPATIENT
Start: 2025-05-28

## 2025-06-02 ENCOUNTER — TELEPHONE (OUTPATIENT)
Dept: FAMILY MEDICINE | Facility: CLINIC | Age: 60
End: 2025-06-02
Payer: COMMERCIAL

## 2025-06-02 NOTE — LETTER
June 17, 2025      Mercedes Long  [address]  [place] [address]        Dear ,    We are writing to inform you of your test results. We have tried to reach you by Telephone and via my chart and have been unsuccessful.     Your liver enzymes have improved.   Your diabetes is also better, but still not well controlled. Let's increase the dose of your Glipizide to 10mg daily. I'll send in a new prescription for you.   Schedule a lab appointment in 3 months for another A1c.    If you have any questions or concerns, please call the clinic at the number listed above.       Sincerely,      Laura Bentley PA-C      Electronically signed

## 2025-06-02 NOTE — TELEPHONE ENCOUNTER
I see labs dated 5/27/25.    Requires Indonesian     I called   Services, placed call to patient with assistance of Indonesian  #[phone].    Sounded like someone picked up but no one spoke.   Tried twice.    I see we have consent to communicate on file with son, Juan Jose Peralta and a daughter in law on file.     called Juan Jose's number, no answer, left message on voicemail requesting call to triage RN for result message at [phone].      Angélica DUKE RN  [facility] Triage

## 2025-06-02 NOTE — TELEPHONE ENCOUNTER
I received notification at my [employer] result note has not been read. Please call patient with the following info:    Your liver enzymes have improved.   Your diabetes is also better, but still not well controlled. Let's increase the dose of your Glipizide to 10mg daily. I'll send in a new prescription for you.   Schedule a lab appointment in 3 months for another A1c.

## 2025-06-05 NOTE — TELEPHONE ENCOUNTER
Called [phone] (home) [phone] (work), using .   Did they answer the phone: No, left a message on voicemail to return call to the [facility] at [phone], and to ask for any available triage nurse.  (RN did not leave specific details on voicemail for confidential reasons)      Teri OWENN RN  Triage Nurse  [facility]

## 2025-06-12 NOTE — TELEPHONE ENCOUNTER
Called patient at [phone] (home), using  services.  Did they answer the phone: No, left a message on voicemail to return call to the [facility] at [phone], and to ask for any available triage nurse.  (RN did not leave specific details on voicemail for confidential reasons)      Teri XIAO RN  Triage Nurse  [facility]

## 2025-06-17 NOTE — TELEPHONE ENCOUNTER
Unable to send certified letter as clinic does not have mail machine on site. Letter mailed to patient on 6/3/25, TC mailed letter and results 6/17/25.

## 2025-06-28 DIAGNOSIS — E11.42 TYPE 2 DIABETES MELLITUS WITH DIABETIC POLYNEUROPATHY, WITHOUT LONG-TERM CURRENT USE OF INSULIN (H): ICD-10-CM

## 2025-06-29 RX ORDER — LISINOPRIL 20 MG/1
20 TABLET ORAL DAILY
Qty: 90 TABLET | Refills: 0 | Status: SHIPPED | OUTPATIENT
Start: 2025-06-29